# Patient Record
Sex: FEMALE | Race: WHITE | NOT HISPANIC OR LATINO
[De-identification: names, ages, dates, MRNs, and addresses within clinical notes are randomized per-mention and may not be internally consistent; named-entity substitution may affect disease eponyms.]

---

## 2018-05-17 ENCOUNTER — APPOINTMENT (OUTPATIENT)
Dept: DERMATOLOGY | Facility: CLINIC | Age: 63
End: 2018-05-17
Payer: COMMERCIAL

## 2018-05-17 VITALS
WEIGHT: 260 LBS | BODY MASS INDEX: 39.4 KG/M2 | SYSTOLIC BLOOD PRESSURE: 110 MMHG | HEIGHT: 68 IN | DIASTOLIC BLOOD PRESSURE: 60 MMHG

## 2018-05-17 DIAGNOSIS — Z12.83 ENCOUNTER FOR SCREENING FOR MALIGNANT NEOPLASM OF SKIN: ICD-10-CM

## 2018-05-17 DIAGNOSIS — Z87.19 PERSONAL HISTORY OF OTHER DISEASES OF THE DIGESTIVE SYSTEM: ICD-10-CM

## 2018-05-17 DIAGNOSIS — Z80.8 FAMILY HISTORY OF MALIGNANT NEOPLASM OF OTHER ORGANS OR SYSTEMS: ICD-10-CM

## 2018-05-17 DIAGNOSIS — L82.1 OTHER SEBORRHEIC KERATOSIS: ICD-10-CM

## 2018-05-17 DIAGNOSIS — L81.4 OTHER MELANIN HYPERPIGMENTATION: ICD-10-CM

## 2018-05-17 DIAGNOSIS — Z87.09 PERSONAL HISTORY OF OTHER DISEASES OF THE RESPIRATORY SYSTEM: ICD-10-CM

## 2018-05-17 DIAGNOSIS — Z87.898 PERSONAL HISTORY OF OTHER SPECIFIED CONDITIONS: ICD-10-CM

## 2018-05-17 PROCEDURE — 99203 OFFICE O/P NEW LOW 30 MIN: CPT

## 2018-10-22 ENCOUNTER — RESULT REVIEW (OUTPATIENT)
Age: 63
End: 2018-10-22

## 2019-09-17 ENCOUNTER — OUTPATIENT (OUTPATIENT)
Dept: OUTPATIENT SERVICES | Facility: HOSPITAL | Age: 64
LOS: 1 days | End: 2019-09-17
Payer: COMMERCIAL

## 2019-09-17 VITALS
SYSTOLIC BLOOD PRESSURE: 110 MMHG | RESPIRATION RATE: 16 BRPM | DIASTOLIC BLOOD PRESSURE: 70 MMHG | OXYGEN SATURATION: 97 % | HEIGHT: 68 IN | WEIGHT: 222.01 LBS | TEMPERATURE: 98 F | HEART RATE: 75 BPM

## 2019-09-17 DIAGNOSIS — F32.9 MAJOR DEPRESSIVE DISORDER, SINGLE EPISODE, UNSPECIFIED: ICD-10-CM

## 2019-09-17 DIAGNOSIS — N84.0 POLYP OF CORPUS UTERI: ICD-10-CM

## 2019-09-17 DIAGNOSIS — L65.9 NONSCARRING HAIR LOSS, UNSPECIFIED: ICD-10-CM

## 2019-09-17 DIAGNOSIS — Z98.890 OTHER SPECIFIED POSTPROCEDURAL STATES: Chronic | ICD-10-CM

## 2019-09-17 DIAGNOSIS — Z01.818 ENCOUNTER FOR OTHER PREPROCEDURAL EXAMINATION: ICD-10-CM

## 2019-09-17 LAB
ANION GAP SERPL CALC-SCNC: 13 MMOL/L — SIGNIFICANT CHANGE UP (ref 5–17)
BUN SERPL-MCNC: 20 MG/DL — SIGNIFICANT CHANGE UP (ref 7–23)
CALCIUM SERPL-MCNC: 9.6 MG/DL — SIGNIFICANT CHANGE UP (ref 8.4–10.5)
CHLORIDE SERPL-SCNC: 101 MMOL/L — SIGNIFICANT CHANGE UP (ref 96–108)
CO2 SERPL-SCNC: 24 MMOL/L — SIGNIFICANT CHANGE UP (ref 22–31)
CREAT SERPL-MCNC: 0.8 MG/DL — SIGNIFICANT CHANGE UP (ref 0.5–1.3)
GLUCOSE SERPL-MCNC: 101 MG/DL — HIGH (ref 70–99)
HCT VFR BLD CALC: 44.1 % — SIGNIFICANT CHANGE UP (ref 34.5–45)
HGB BLD-MCNC: 13.9 G/DL — SIGNIFICANT CHANGE UP (ref 11.5–15.5)
MCHC RBC-ENTMCNC: 29.9 PG — SIGNIFICANT CHANGE UP (ref 27–34)
MCHC RBC-ENTMCNC: 31.5 GM/DL — LOW (ref 32–36)
MCV RBC AUTO: 94.8 FL — SIGNIFICANT CHANGE UP (ref 80–100)
PLATELET # BLD AUTO: 322 K/UL — SIGNIFICANT CHANGE UP (ref 150–400)
POTASSIUM SERPL-MCNC: 3.4 MMOL/L — LOW (ref 3.5–5.3)
POTASSIUM SERPL-SCNC: 3.4 MMOL/L — LOW (ref 3.5–5.3)
RBC # BLD: 4.65 M/UL — SIGNIFICANT CHANGE UP (ref 3.8–5.2)
RBC # FLD: 14.1 % — SIGNIFICANT CHANGE UP (ref 10.3–14.5)
SODIUM SERPL-SCNC: 138 MMOL/L — SIGNIFICANT CHANGE UP (ref 135–145)
WBC # BLD: 7.78 K/UL — SIGNIFICANT CHANGE UP (ref 3.8–10.5)
WBC # FLD AUTO: 7.78 K/UL — SIGNIFICANT CHANGE UP (ref 3.8–10.5)

## 2019-09-17 PROCEDURE — G0463: CPT

## 2019-09-17 PROCEDURE — 80048 BASIC METABOLIC PNL TOTAL CA: CPT

## 2019-09-17 PROCEDURE — 85027 COMPLETE CBC AUTOMATED: CPT

## 2019-09-17 RX ORDER — LIDOCAINE HCL 20 MG/ML
0.2 VIAL (ML) INJECTION ONCE
Refills: 0 | Status: DISCONTINUED | OUTPATIENT
Start: 2019-09-24 | End: 2019-10-19

## 2019-09-17 RX ORDER — SODIUM CHLORIDE 9 MG/ML
3 INJECTION INTRAMUSCULAR; INTRAVENOUS; SUBCUTANEOUS EVERY 8 HOURS
Refills: 0 | Status: DISCONTINUED | OUTPATIENT
Start: 2019-09-24 | End: 2019-10-19

## 2019-09-17 NOTE — H&P PST ADULT - NSICDXPASTMEDICALHX_GEN_ALL_CORE_FT
PAST MEDICAL HISTORY:  Depression on medication    GERD (gastroesophageal reflux disease) on meds prn stable at present    Hair loss takes spironolactone ,finasteride  for supplement

## 2019-09-17 NOTE — H&P PST ADULT - NSICDXPROBLEM_GEN_ALL_CORE_FT
PROBLEM DIAGNOSES  Problem: Polyp of corpus uteri  Assessment and Plan: Exam under anesthesia ,dilation and curettage,operative hysteroscopy,removal of endometrial poly, PSt instruction given , CBC/BMP drawn sent pending result ,     Problem: Loss of hair  Assessment and Plan: Patient taking spironolactone /finasteride for hair loss prevention will not take them on DOS     Problem: Depressed mood  Assessment and Plan: To continue taking Wellbutrin regularly as prescribed

## 2019-09-17 NOTE — H&P PST ADULT - NSANTHOSAYNRD_GEN_A_CORE
No. JASWINDER screening performed.  STOP BANG Legend: 0-2 = LOW Risk; 3-4 = INTERMEDIATE Risk; 5-8 = HIGH Risk

## 2019-09-17 NOTE — H&P PST ADULT - HISTORY OF PRESENT ILLNESS
64 year old female with hx of depression gerd, hair loss. Patient went for regular gyn exam , had ultrasound done which was positive for uterine polyps. Patient was referred to Dr Bhagat , evaluated and now indicated this procedure for treatment.

## 2019-09-23 ENCOUNTER — TRANSCRIPTION ENCOUNTER (OUTPATIENT)
Age: 64
End: 2019-09-23

## 2019-09-24 ENCOUNTER — RESULT REVIEW (OUTPATIENT)
Age: 64
End: 2019-09-24

## 2019-09-24 ENCOUNTER — OUTPATIENT (OUTPATIENT)
Dept: OUTPATIENT SERVICES | Facility: HOSPITAL | Age: 64
LOS: 1 days | End: 2019-09-24
Payer: COMMERCIAL

## 2019-09-24 VITALS
SYSTOLIC BLOOD PRESSURE: 121 MMHG | RESPIRATION RATE: 18 BRPM | OXYGEN SATURATION: 100 % | HEART RATE: 76 BPM | DIASTOLIC BLOOD PRESSURE: 70 MMHG | TEMPERATURE: 98 F

## 2019-09-24 VITALS
HEIGHT: 68 IN | OXYGEN SATURATION: 98 % | DIASTOLIC BLOOD PRESSURE: 69 MMHG | HEART RATE: 76 BPM | SYSTOLIC BLOOD PRESSURE: 104 MMHG | TEMPERATURE: 99 F | WEIGHT: 222.01 LBS | RESPIRATION RATE: 16 BRPM

## 2019-09-24 DIAGNOSIS — Z98.890 OTHER SPECIFIED POSTPROCEDURAL STATES: Chronic | ICD-10-CM

## 2019-09-24 DIAGNOSIS — N84.0 POLYP OF CORPUS UTERI: ICD-10-CM

## 2019-09-24 PROCEDURE — 88305 TISSUE EXAM BY PATHOLOGIST: CPT | Mod: 26

## 2019-09-24 PROCEDURE — 88305 TISSUE EXAM BY PATHOLOGIST: CPT

## 2019-09-24 PROCEDURE — 58558 HYSTEROSCOPY BIOPSY: CPT

## 2019-09-24 RX ORDER — OXYCODONE HYDROCHLORIDE 5 MG/1
5 TABLET ORAL ONCE
Refills: 0 | Status: DISCONTINUED | OUTPATIENT
Start: 2019-09-24 | End: 2019-09-24

## 2019-09-24 RX ORDER — ACETAMINOPHEN 500 MG
1000 TABLET ORAL ONCE
Refills: 0 | Status: COMPLETED | OUTPATIENT
Start: 2019-09-24 | End: 2019-09-24

## 2019-09-24 RX ORDER — ONDANSETRON 8 MG/1
4 TABLET, FILM COATED ORAL ONCE
Refills: 0 | Status: DISCONTINUED | OUTPATIENT
Start: 2019-09-24 | End: 2019-10-19

## 2019-09-24 RX ORDER — CELECOXIB 200 MG/1
200 CAPSULE ORAL ONCE
Refills: 0 | Status: DISCONTINUED | OUTPATIENT
Start: 2019-09-24 | End: 2019-10-19

## 2019-09-24 RX ORDER — HYDROMORPHONE HYDROCHLORIDE 2 MG/ML
0.25 INJECTION INTRAMUSCULAR; INTRAVENOUS; SUBCUTANEOUS
Refills: 0 | Status: DISCONTINUED | OUTPATIENT
Start: 2019-09-24 | End: 2019-09-24

## 2019-09-24 RX ORDER — CELECOXIB 200 MG/1
200 CAPSULE ORAL ONCE
Refills: 0 | Status: COMPLETED | OUTPATIENT
Start: 2019-09-24 | End: 2019-09-24

## 2019-09-24 RX ORDER — SODIUM CHLORIDE 9 MG/ML
1000 INJECTION, SOLUTION INTRAVENOUS
Refills: 0 | Status: DISCONTINUED | OUTPATIENT
Start: 2019-09-24 | End: 2019-10-19

## 2019-09-24 RX ADMIN — Medication 1000 MILLIGRAM(S): at 07:23

## 2019-09-24 RX ADMIN — CELECOXIB 200 MILLIGRAM(S): 200 CAPSULE ORAL at 07:23

## 2019-09-24 NOTE — ASU DISCHARGE PLAN (ADULT/PEDIATRIC) - ASU DC SPECIAL INSTRUCTIONSFT
MD DANIEL HOOD MD MARINESE DORENE, MD CHUNG HETTY, MD HUNTER TIFFANY, MD AHAMED TARNIMA, MD  PHONE: [phone]  FAX: [phone]    POSTOPERATIVE INSTRUCTIONS FOR HYSTEROSCOPY, ENDOMETRIAL POLYP/FIBROID REMOVAL,D&C    After your surgery it is normal to experience:    •	Vaginal bleeding- can last 1-2 weeks and should not be heavier than a period. It may come and go and be red, brown or pink. Use pads, not tampons.  •	Cramping- Is common especially within the first 24 hours. This should be relieved by taking over the counter motrin, advil or Tylenol.  •	Watery discharge- can be seen for a day or two after hysteroscopy because some of the fluid that is put into the uterus during surgery may continue to leak out for a day or two.  •	Vaginal soreness/irritation- can occur in the first few days after surgery because of the instruments that were used in the vagina. Soreness can be treated with ice pack and irritation can be taken care of with an emollient such as balmex or aquaphor that you can put directly on the irritated area.    Restrictions: For 10-14 days after the surgery you should avoid the following:    •	Tampons  •	Sex  •	Vigorous gym exercise  •	Swimming  pools and tub baths  •	Wait a day or two before going back to work    Anesthesia Precautions:  For the next 12 hours do not:   •	drive a car,  •	 operate power tools or machinery,  •	drink alcohol, beer, or wine,   •	make important personal or business decisions    Diet:   •	Resume Regular diet but Progress diet slowly     Physician Notification- Warning signs to look out for  •	Heavy Vaginal Bleeding   •	Shortness of breath or chest pain  •	Severe Abdominal Pain  •	Persistent nausea and vomiting  •	Pain not relieved by medications  •	Fever greater than 100.5®F  •	Inability to tolerate liquids or foods  •	Unable to urinate after 8 hours    Discharge and Disposition  •	Discharge to home    Follow Up Care:  •	In the event that you develop a complication and you are unable to reach your own physician, you may contact:  [phone] or go to the nearest Emergency Room.   •	Please contact the office to make a follow up appointment [phone]

## 2019-09-24 NOTE — BRIEF OPERATIVE NOTE - NSICDXBRIEFPOSTOP_GEN_ALL_CORE_FT
POST-OP DIAGNOSIS:  Endometrial polyp 24-Sep-2019 09:08:03  Cuca Wise  Cervical polyp 24-Sep-2019 09:07:56  Cuca Wise

## 2019-09-24 NOTE — BRIEF OPERATIVE NOTE - NSICDXBRIEFPROCEDURE_GEN_ALL_CORE_FT
PROCEDURES:  Hysteroscopy with polypectomy of uterus 24-Sep-2019 09:08:18  Cuca Wise  Hysteroscopic surgical procedure 24-Sep-2019 09:08:16  Cuca Wise  Exam, under anesthesia 24-Sep-2019 09:08:10  Cuca Wise

## 2019-09-24 NOTE — ASU PATIENT PROFILE, ADULT - PMH
Depression  on medication  GERD (gastroesophageal reflux disease)  on meds prn stable at present  Hair loss  takes spironolactone ,finasteride  for supplement

## 2019-09-26 LAB — SURGICAL PATHOLOGY STUDY: SIGNIFICANT CHANGE UP

## 2021-01-13 ENCOUNTER — LABORATORY RESULT (OUTPATIENT)
Age: 66
End: 2021-01-13

## 2021-01-13 ENCOUNTER — RESULT REVIEW (OUTPATIENT)
Age: 66
End: 2021-01-13

## 2021-10-17 ENCOUNTER — INPATIENT (INPATIENT)
Facility: HOSPITAL | Age: 66
LOS: 5 days | Discharge: HOME | DRG: 86 | End: 2021-10-23
Attending: NEUROLOGICAL SURGERY | Admitting: NEUROLOGICAL SURGERY
Payer: COMMERCIAL

## 2021-10-17 VITALS — OXYGEN SATURATION: 97 % | SYSTOLIC BLOOD PRESSURE: 147 MMHG | HEART RATE: 76 BPM | DIASTOLIC BLOOD PRESSURE: 88 MMHG

## 2021-10-17 DIAGNOSIS — S06.6X9A TRAUMATIC SUBARACHNOID HEMORRHAGE WITH LOSS OF CONSCIOUSNESS OF UNSPECIFIED DURATION, INITIAL ENCOUNTER: ICD-10-CM

## 2021-10-17 DIAGNOSIS — Z90.5 ACQUIRED ABSENCE OF KIDNEY: Chronic | ICD-10-CM

## 2021-10-17 DIAGNOSIS — R09.89 OTHER SPECIFIED SYMPTOMS AND SIGNS INVOLVING THE CIRCULATORY AND RESPIRATORY SYSTEMS: ICD-10-CM

## 2021-10-17 DIAGNOSIS — Z98.890 OTHER SPECIFIED POSTPROCEDURAL STATES: Chronic | ICD-10-CM

## 2021-10-17 LAB
ALBUMIN SERPL ELPH-MCNC: 4 G/DL — SIGNIFICANT CHANGE UP (ref 3.3–5)
ALP SERPL-CCNC: 98 U/L — SIGNIFICANT CHANGE UP (ref 40–120)
ALT FLD-CCNC: 12 U/L — SIGNIFICANT CHANGE UP (ref 10–45)
ANION GAP SERPL CALC-SCNC: 14 MMOL/L — SIGNIFICANT CHANGE UP (ref 5–17)
APTT BLD: 29.7 SEC — SIGNIFICANT CHANGE UP (ref 27.5–35.5)
AST SERPL-CCNC: 17 U/L — SIGNIFICANT CHANGE UP (ref 10–40)
BASOPHILS # BLD AUTO: 0.07 K/UL — SIGNIFICANT CHANGE UP (ref 0–0.2)
BASOPHILS NFR BLD AUTO: 0.7 % — SIGNIFICANT CHANGE UP (ref 0–2)
BILIRUB SERPL-MCNC: 0.4 MG/DL — SIGNIFICANT CHANGE UP (ref 0.2–1.2)
BLD GP AB SCN SERPL QL: NEGATIVE — SIGNIFICANT CHANGE UP
BUN SERPL-MCNC: 22 MG/DL — SIGNIFICANT CHANGE UP (ref 7–23)
CALCIUM SERPL-MCNC: 9.4 MG/DL — SIGNIFICANT CHANGE UP (ref 8.4–10.5)
CHLORIDE SERPL-SCNC: 101 MMOL/L — SIGNIFICANT CHANGE UP (ref 96–108)
CO2 SERPL-SCNC: 21 MMOL/L — LOW (ref 22–31)
CREAT SERPL-MCNC: 1.24 MG/DL — SIGNIFICANT CHANGE UP (ref 0.5–1.3)
EOSINOPHIL # BLD AUTO: 0.15 K/UL — SIGNIFICANT CHANGE UP (ref 0–0.5)
EOSINOPHIL NFR BLD AUTO: 1.5 % — SIGNIFICANT CHANGE UP (ref 0–6)
ETHANOL SERPL-MCNC: SIGNIFICANT CHANGE UP MG/DL (ref 0–10)
GLUCOSE SERPL-MCNC: 144 MG/DL — HIGH (ref 70–99)
HCT VFR BLD CALC: 42.7 % — SIGNIFICANT CHANGE UP (ref 34.5–45)
HGB BLD-MCNC: 13.5 G/DL — SIGNIFICANT CHANGE UP (ref 11.5–15.5)
IMM GRANULOCYTES NFR BLD AUTO: 0.2 % — SIGNIFICANT CHANGE UP (ref 0–1.5)
INR BLD: 1.04 RATIO — SIGNIFICANT CHANGE UP (ref 0.88–1.16)
LYMPHOCYTES # BLD AUTO: 29.9 % — SIGNIFICANT CHANGE UP (ref 13–44)
LYMPHOCYTES # BLD AUTO: 3.04 K/UL — SIGNIFICANT CHANGE UP (ref 1–3.3)
MCHC RBC-ENTMCNC: 29.9 PG — SIGNIFICANT CHANGE UP (ref 27–34)
MCHC RBC-ENTMCNC: 31.6 GM/DL — LOW (ref 32–36)
MCV RBC AUTO: 94.7 FL — SIGNIFICANT CHANGE UP (ref 80–100)
MONOCYTES # BLD AUTO: 0.77 K/UL — SIGNIFICANT CHANGE UP (ref 0–0.9)
MONOCYTES NFR BLD AUTO: 7.6 % — SIGNIFICANT CHANGE UP (ref 2–14)
NEUTROPHILS # BLD AUTO: 6.11 K/UL — SIGNIFICANT CHANGE UP (ref 1.8–7.4)
NEUTROPHILS NFR BLD AUTO: 60.1 % — SIGNIFICANT CHANGE UP (ref 43–77)
NRBC # BLD: 0 /100 WBCS — SIGNIFICANT CHANGE UP (ref 0–0)
PA ADP PRP-ACNC: 220 PRU — SIGNIFICANT CHANGE UP (ref 194–417)
PLATELET # BLD AUTO: 236 K/UL — SIGNIFICANT CHANGE UP (ref 150–400)
PLATELET RESPONSE ASPIRIN RESULT: 632 ARU — SIGNIFICANT CHANGE UP (ref 350–700)
POTASSIUM SERPL-MCNC: 3.8 MMOL/L — SIGNIFICANT CHANGE UP (ref 3.5–5.3)
POTASSIUM SERPL-SCNC: 3.8 MMOL/L — SIGNIFICANT CHANGE UP (ref 3.5–5.3)
PROT SERPL-MCNC: 6.9 G/DL — SIGNIFICANT CHANGE UP (ref 6–8.3)
PROTHROM AB SERPL-ACNC: 12.4 SEC — SIGNIFICANT CHANGE UP (ref 10.6–13.6)
RBC # BLD: 4.51 M/UL — SIGNIFICANT CHANGE UP (ref 3.8–5.2)
RBC # FLD: 13.9 % — SIGNIFICANT CHANGE UP (ref 10.3–14.5)
RH IG SCN BLD-IMP: POSITIVE — SIGNIFICANT CHANGE UP
SARS-COV-2 RNA SPEC QL NAA+PROBE: SIGNIFICANT CHANGE UP
SODIUM SERPL-SCNC: 136 MMOL/L — SIGNIFICANT CHANGE UP (ref 135–145)
TROPONIN T, HIGH SENSITIVITY RESULT: <6 NG/L — SIGNIFICANT CHANGE UP (ref 0–51)
WBC # BLD: 10.16 K/UL — SIGNIFICANT CHANGE UP (ref 3.8–10.5)
WBC # FLD AUTO: 10.16 K/UL — SIGNIFICANT CHANGE UP (ref 3.8–10.5)

## 2021-10-17 PROCEDURE — 99291 CRITICAL CARE FIRST HOUR: CPT

## 2021-10-17 PROCEDURE — 93010 ELECTROCARDIOGRAM REPORT: CPT

## 2021-10-17 PROCEDURE — 71045 X-RAY EXAM CHEST 1 VIEW: CPT | Mod: 26

## 2021-10-17 PROCEDURE — 70496 CT ANGIOGRAPHY HEAD: CPT | Mod: 26,MA

## 2021-10-17 PROCEDURE — 70450 CT HEAD/BRAIN W/O DYE: CPT | Mod: 26,59,77,MA

## 2021-10-17 PROCEDURE — 72125 CT NECK SPINE W/O DYE: CPT | Mod: 26,MA

## 2021-10-17 PROCEDURE — 99232 SBSQ HOSP IP/OBS MODERATE 35: CPT | Mod: GC

## 2021-10-17 PROCEDURE — 74177 CT ABD & PELVIS W/CONTRAST: CPT | Mod: 26,MA

## 2021-10-17 PROCEDURE — 71260 CT THORAX DX C+: CPT | Mod: 26,MA

## 2021-10-17 PROCEDURE — 70450 CT HEAD/BRAIN W/O DYE: CPT | Mod: 26,59,MA

## 2021-10-17 PROCEDURE — 72170 X-RAY EXAM OF PELVIS: CPT | Mod: 26

## 2021-10-17 RX ORDER — ONDANSETRON 8 MG/1
4 TABLET, FILM COATED ORAL EVERY 6 HOURS
Refills: 0 | Status: DISCONTINUED | OUTPATIENT
Start: 2021-10-17 | End: 2021-10-23

## 2021-10-17 RX ORDER — VENLAFAXINE HCL 75 MG
150 CAPSULE, EXT RELEASE 24 HR ORAL DAILY
Refills: 0 | Status: DISCONTINUED | OUTPATIENT
Start: 2021-10-17 | End: 2021-10-23

## 2021-10-17 RX ORDER — OXYCODONE HYDROCHLORIDE 5 MG/1
10 TABLET ORAL EVERY 4 HOURS
Refills: 0 | Status: DISCONTINUED | OUTPATIENT
Start: 2021-10-17 | End: 2021-10-23

## 2021-10-17 RX ORDER — BUPROPION HYDROCHLORIDE 150 MG/1
300 TABLET, EXTENDED RELEASE ORAL DAILY
Refills: 0 | Status: DISCONTINUED | OUTPATIENT
Start: 2021-10-17 | End: 2021-10-18

## 2021-10-17 RX ORDER — SENNA PLUS 8.6 MG/1
2 TABLET ORAL AT BEDTIME
Refills: 0 | Status: DISCONTINUED | OUTPATIENT
Start: 2021-10-17 | End: 2021-10-20

## 2021-10-17 RX ORDER — OXYCODONE HYDROCHLORIDE 5 MG/1
5 TABLET ORAL EVERY 4 HOURS
Refills: 0 | Status: DISCONTINUED | OUTPATIENT
Start: 2021-10-17 | End: 2021-10-23

## 2021-10-17 RX ORDER — ACETAMINOPHEN 500 MG
650 TABLET ORAL EVERY 6 HOURS
Refills: 0 | Status: DISCONTINUED | OUTPATIENT
Start: 2021-10-17 | End: 2021-10-23

## 2021-10-17 RX ORDER — LEVETIRACETAM 250 MG/1
500 TABLET, FILM COATED ORAL EVERY 12 HOURS
Refills: 0 | Status: DISCONTINUED | OUTPATIENT
Start: 2021-10-17 | End: 2021-10-23

## 2021-10-17 RX ORDER — ONDANSETRON 8 MG/1
4 TABLET, FILM COATED ORAL ONCE
Refills: 0 | Status: COMPLETED | OUTPATIENT
Start: 2021-10-17 | End: 2021-10-17

## 2021-10-17 RX ORDER — POLYETHYLENE GLYCOL 3350 17 G/17G
17 POWDER, FOR SOLUTION ORAL EVERY 12 HOURS
Refills: 0 | Status: DISCONTINUED | OUTPATIENT
Start: 2021-10-17 | End: 2021-10-23

## 2021-10-17 RX ORDER — SODIUM CHLORIDE 9 MG/ML
1000 INJECTION INTRAMUSCULAR; INTRAVENOUS; SUBCUTANEOUS
Refills: 0 | Status: DISCONTINUED | OUTPATIENT
Start: 2021-10-17 | End: 2021-10-20

## 2021-10-17 RX ORDER — PANTOPRAZOLE SODIUM 20 MG/1
40 TABLET, DELAYED RELEASE ORAL
Refills: 0 | Status: DISCONTINUED | OUTPATIENT
Start: 2021-10-17 | End: 2021-10-23

## 2021-10-17 RX ORDER — METOCLOPRAMIDE HCL 10 MG
10 TABLET ORAL ONCE
Refills: 0 | Status: COMPLETED | OUTPATIENT
Start: 2021-10-17 | End: 2021-10-17

## 2021-10-17 RX ADMIN — ONDANSETRON 4 MILLIGRAM(S): 8 TABLET, FILM COATED ORAL at 19:29

## 2021-10-17 RX ADMIN — Medication 10 MILLIGRAM(S): at 20:11

## 2021-10-17 RX ADMIN — ONDANSETRON 4 MILLIGRAM(S): 8 TABLET, FILM COATED ORAL at 17:38

## 2021-10-17 NOTE — H&P ADULT - HISTORY OF PRESENT ILLNESS
66F s/p mech fall down stairs, last ASA 3d ago, , pw HA/nausea (concussion sxs), labs wnl, CTH w/ trace R frontal tSAH and L sub occip skull fx, repeat CTH w/ trace inc R frontal tsah, thin bifrontal sdh, and new thin temporal tsah. CTV hypoplastic L TS but open. No other inj. Exam: EOV otherwise neuro intact. GCS 14.

## 2021-10-17 NOTE — ED PROVIDER NOTE - PROGRESS NOTE DETAILS
Gio, PGY3: when pt laying flat had 1 episode of non bloody emesis Attending MD Sawant.  ALIS paged for consult 2/2 above.  Gen surg consulted 2/2 traumatic ICH with somnolence but rousability and GCS 15.  No other areas of injury identified on initial primary/secondary survey however suspect need for admission/further care 2/2 above.  Will discuss with both services. Attending MD Sawant.  Pt endorsed to gen surg. Attending MD Sawant.  Received call from radiology re: pt's images.  Concern for R posterior, parietal-occipital calvarial fx and some hyperattenuation in R frontal convexity concerning for SAH.  Concern for coup-contracoup.  R frontal injury. Ford, PGY3 - Discussed with neurosurgery, plan 3 hour CT venogram and rpt CTH - will do at 10pm. will come see pt

## 2021-10-17 NOTE — ED PROVIDER NOTE - OBJECTIVE STATEMENT
67y/o F w/ h/o HLD, KAI, MDD on occasional ASA BIBEMS for trauma. 20 min prior to arrival. Pt fell down 6steps had LOC for 1min now c/o nausea. No dizziness, headache. Believes it might be mechanical fall. 65y/o F w/ h/o HLD, KAI, MDD on occasional ASA BIBEMS for trauma 20 min prior to arrival fell down 6 steps had LOC for 1min now c/o nausea. No dizziness, headache. Pt believes it might be mechanical fall and tripped going down stairs.    A-intact  B-b/l breath sounds  C-2+pulses in upper and lower extremities  D-GCS 15  E- no evidence of trauma or midline tenderness

## 2021-10-17 NOTE — H&P ADULT - NSHPPHYSICALEXAM_GEN_ALL_CORE
See A/P AOx3, EOV, PERRL (R pupil surgical), EOMI, L trace facial (b/l from plastic sx), LEONARD 5/5, no drift, SILT

## 2021-10-17 NOTE — ED PROVIDER NOTE - NS ED ROS FT
GENERAL: No fever, no chills  EYES: no change in vision  HEENT: no trouble swallowing, no trouble speaking  CARDIAC: no chest pain, no palpitations  PULMONARY: no cough, no SOB  GI: no abdominal pain, + nausea, no vomiting, no diarrhea, no constipation  : no dysuria, no frequency, no change in appearance, no odor of urine  SKIN: no rashes  NEURO: no headache, no weakness  MSK: no joint pain

## 2021-10-17 NOTE — ED ADULT NURSE REASSESSMENT NOTE - NS ED NURSE REASSESS COMMENT FT1
Received report from Janny MAY. Patient AAOX3, c/o nausea. ED MD Sawant aware. Neuro check completed and placed in patients chart. Received report from Janny MAY. Patient AAOX3, daughter at bedside. Pt c/o nausea, no pain at this time. ED MD Sawant made aware. Neuro check completed, placed in patients chart.

## 2021-10-17 NOTE — ED PROVIDER NOTE - ATTENDING CONTRIBUTION TO CARE
Attending MD Sawant.  Agree with HPI by resident.  Pt with initial GCS in field reported to be 13.  On arrival to ED however pt is fatigued/somnolent but easily rousable and A & O x 4 without memory of event or lead up to event.  Family present and states that she was behaving normally prior to event.  No report of CP/SOB/light-headedness or severe headache prior to event.  Family states that pt has hx of renal cell CA s/p resection/chemo and no evidence of disease now at 1 yr f/u.  No AC on board, occasional ASA only.  PT neurologically afocal on arrival.  Denies HA/CP/SOB or pain complaints at all.  Pt's only complaint is nausea.  Pt has had 1 episode non-bloody, non-bilious emesis in ED.  Pt in C-collar on arrival.  2/2 GCS 15 and no pain complaints, no trauma level called on arrival.  Pt expedited to imaging however for concern for traumatic bleed.    Critical care billing:  Upon my evaluation, this patient had a high probability of imminent or life-threatening deterioration due to traumatic subarachnoid hemorrhage, [id number] injury, which required my direct attention, intervention, and personal management.  The patient has a  medical condition that impairs one or more vital organ systems.  Frequent personal assessment and adjustment of medical interventions was performed.      I have personally provided 45 minutes of critical care time exclusive of time spent on separately billable procedures. Time includes review of laboratory data, radiology results, discussion with consultants, patient and family; monitoring for potential decompensation, as well as time spent retrieving data and reviewing the chart and documenting the visit. Interventions were performed as documented above.

## 2021-10-17 NOTE — H&P ADULT - ASSESSMENT
ROSETTA LOWRY  66F s/p mech fall down stairs, last ASA 3d ago, , pw HA/nausea (concussion sxs), labs wnl, CTH w/ trace R frontal tSAH and L sub occip skull fx, repeat CTH w/ trace inc R frontal tsah, thin bifrontal sdh, and new thin temporal tsah. CTV hypoplastic L TS but open. No other inj. Exam: EOV otherwise neuro intact. GCS 14.  - adm 4 [facility] for obs and repeat CTH in AM, antinausea/concussion mgmt   - no acute surgical intervention  - hold ASA, keppra x7d  - neurology/concussion outpt fu  - PT/OT  - q4h neuro checks

## 2021-10-17 NOTE — ED PROVIDER NOTE - CARE PLAN
Principal Discharge DX:	Traumatic subarachnoid hemorrhage  Secondary Diagnosis:	Calvarial fracture   1

## 2021-10-17 NOTE — ED ADULT NURSE NOTE - OBJECTIVE STATEMENT
65 yo presents to the ED from home. A&Ox4 by EMS s/p trip and fall down 10 steps. pt reports LOC. daughter reports AMS since fall. daughter reports that pt is "slower". pt reports nausea with multiple episodes of vomiting. on aspirin daily. neuro intact. pupils reactive bilaterally. strength equal bilaterally UE and LE. denies abd pain. pt arrives in C collar. pt arrives with IV in L hand. additional IVs placed.

## 2021-10-17 NOTE — CONSULT NOTE ADULT - SUBJECTIVE AND OBJECTIVE BOX
TRAUMA SURGERY CONSULT NOTE  :::::::::::::::::::::::::::::::::::::::::::::::::::::  ROSETTA LOWRY 66y F  MRN: [id number]  Admit Date: 10-17-21  [facility] ED  :::::::::::::::::::::::::::::::::::::::::::::::::::::  Date of Service: 10-17-21 @ 19:38  Requested by: ED    Reason for consult: Mechanical fall    :::::::::::::::::::::::::::::::::::::::::::::::::::::    HPI:   66 year old Female with PMH of HLD, HAD, MDD, hiatal hernia and PSgx right renal cancer s/p nephrectomy with ureterostomy 10/2019 not on AC medications who presents after an unwitnessed mechanical fall down 6 steps referring she "slipped" without preceding symptoms. Denies hitting her head and was able to ambulate without difficulty however admits poor recollection of the event. Denies previous falls or traumatic injuries.    In the ED patient complains of nausea and one episode of non-bloody emesis associated with somnolence and headache. Denies visual disturbances, dizziness, chest pain, SOB, abdominal pain, dysuria or issues with ambulating.       Allergies: No Known Allergies    Past Medical History:   HLD (hyperlipidemia)  MDD  HLD    Past Surgical History:  S/p nephrectomy  H/O ureterostomy    Family History:  No family history of heritable disease    Social History:   Denies toxic habits    Home Medications:  Effexor 75mg BID  Wellbutrin 400mg Daily     ::::::::::::::::::::::::::::::::::::::::::::::::::::::::::::::::::::::::::::::::::::::::::::::::::::::::::::::::::::::::::::::::::::::::::    Vital signs:  T(C): 36.3, Max: 36.6 (10-17 @ 18:00)  HR: 74 (71 - 76)  BP: 125/77 (125/77 - 147/88)  BP(mean): 92 (92 - 92)  ABP: --  RR: 18 (18 - 18)  SpO2: 98% (97% - 98%)    Primary Survey:  Airway: Intact  Breathing: Symmetric chest expansion, conversational  Cardiovascular: Peripheral pulses bilaterally in upper and lower extremities +2, AAOx3    Secondary Survey:  General: NAD, female appearing stated age  Neuro: Awake, alert and oriented, no focal deficits sensory and motor intact bilaterally in upper and lower extremities  HEENT: NC, AT, MOM  Resp: Unlabored breathing, symmetric chest expansion  GI/Abd: Soft, NT/ND  Musculoskeletal: All 4 extremities moving spontaneously and without deformity  Vascular: Peripheral pulses +2 in upper and lower extremities  Skin: Warm, dry, no rash    ::::::::::::::::::::::::::::::::::::::::::::::::::::::::::::::::::::::::::::::::::::::::::::::::::::::::::::::::::::::::::::::::::::::::::    Laboratory:                        13.5   10.16 )-----------( 236      ( 10-17 @ 17:38 )             42.7     10-17                             Phos: x  Mg: x   136  |  101  |  22  ----------------------------<  144  3.8   |  21  |  1.24        10-17   TPro 6.9 / Alb 4.0 / TBili 0.4 / DBili x  / AST/AST 17/12 / AlkPhos 98    PT/INR/PTT - (10-17 @ 17:38) PT: 12.4 sec; INR: 1.04 ratio ; PTT: 29.7 sec              ::::::::::::::::::::::::::::::::::::::::::::::::::::::::::::::::::::::::::::::::::::::::::::::::::::::::::::::::::::::::::::::::::::::::::    Imaging:  < from: Xray Chest 1 View AP/PA (10.17.21 @ 18:24) >  EXAM:  XR CHEST AP OR PA 1V                            PROCEDURE DATE:  10/17/2021      ******PRELIMINARY REPORT******    ******PRELIMINARY REPORT******              INTERPRETATION:  No focal airspace opacity.  No pneumothorax.    < end of copied text >  < from: Xray Pelvis AP only (10.17.21 @ 18:24) >  EXAM:  XR PELVIS AP ONLY 1-2 VIEWS                            PROCEDURE DATE:  10/17/2021      ******PRELIMINARY REPORT******    ******PRELIMINARY REPORT******              INTERPRETATION:  No acute fracture or dislocation.    < end of copied text >  < from: CT Cervical Spine No Cont (10.17.21 @ 19:14) >  FINDINGS:    BRAIN:    There are small foci of hyperattenuation in the right frontal convexity measuring up to 4 cm compatible with small traumatic subarachnoid hemorrhage (2:22). No acute intraparenchymal hemorrhage, mass effect, or midline shift. Age appropriate involutional changes and mild small vessel white matter changes.No hydrocephalus.    PARANASAL SINUSES: Within normal limits.  TYMPANOMASTOID CAVITIES: Within normal limits.  ORBITS: Right intraocular lens replacement.  CALVARIUM: Acute nondisplaced fracture of the left left suboccipital calvarium  MISCELLANEOUS: Left posterior parieto-occipital subcutaneous hematoma.    CERVICAL SPINE:  ALIGNMENT: Straightening of the cervical lordosis.  BONES: No acute fracture or subluxation.  DISCS:Multilevel degenerative changes characterized by intervertebral disc height loss most notably at C5-C7 with small disc bulges, marginal osteophyte formation, and uncovertebral and facet joint arthrosis. No significant spinal canal or foraminal narrowing at CT.  NECK SOFT TISSUES: No prevertebral soft tissue swelling.  VISUALIZED LUNGS: Within normal limits.    IMPRESSION:    Head CT: Acute subarachnoid hemorrhage in the right frontal convexity with a acute nondisplaced left suboccipital calvarialfracture. Left parieto-occipital extracalvarial soft tissue swelling    Cervical spine CT: No acute fracture or subluxation.    These findings were discussed with Dr. Sawant at 10/17/2021 6:39 PM by Dr. Penn with read back confirmation.    --- End of Report ---    < end of copied text >  < from: CT Chest w/ IV Cont (10.17.21 @ 19:16) >  FINDINGS:  CHEST:  LUNGS AND LARGE AIRWAYS: Patent central airways. Trace bibasilar subsegmental atelectasis.  PLEURA: No pleural effusion.  VESSELS: Within normal limits.  HEART: Heart size is normal. No pericardial effusion.  MEDIASTINUM AND BRYCE: No lymphadenopathy.  CHEST WALL AND LOWER NECK: Within normal limits.    ABDOMEN AND PELVIS:  LIVER: Subcentimeter hypoattenuating focus on the hepatic dome which is too small to characterize.  BILE DUCTS: Normal caliber.  GALLBLADDER: Within normal limits.  SPLEEN: Within normal limits.  PANCREAS: Within normal limits.  ADRENALS: Withinnormal limits.  KIDNEYS/URETERS: Status post right nephrectomy. Left renal cyst and additional subcentimeter hypoattenuating foci which are too small to characterize.    BLADDER: Within normal limits.  REPRODUCTIVE ORGANS: Uterus and adnexa within normal limits.    BOWEL: No bowel obstruction.  PERITONEUM: No ascites.  VESSELS: Atherosclerotic changes.  RETROPERITONEUM/LYMPH NODES: No lymphadenopathy.  ABDOMINAL WALL: Small fat-containing umbilical hernia.  BONES: Degenerative changes.    IMPRESSION:    No acute intrathoracic or intra-abdominal/pelvic pathology.        < end of copied text >      ::::::::::::::::::::::::::::::::::::::::::::::::::::::::::::::::::::::::::::::::::::::::::::::::::::::::::::::::::::::::::::::::::::::::::

## 2021-10-17 NOTE — ED PROVIDER NOTE - PHYSICAL EXAMINATION
Gen: NAD, non-toxic appearing  Head: normal appearing  HEENT: normal conjunctiva, oral mucosa moist  Lung: no respiratory distress, speaking in full sentences, CTA b/l     CV: regular rate and rhythm, no murmurs  Abd: soft, non distended, non tender   MSK: no visible deformities, no midline tenderness  Neuro: No focal deficits, AAOx3  Skin: Warm  Psych: normal affect

## 2021-10-17 NOTE — H&P ADULT - ATTENDING COMMENTS
Pt seen and examined 10/18/21.  Agree with above resident evaluation and assessment.  Pt seen and in bed, preferring not to open eyes, except briefly.  LEONARD on command.  CT with now stable bilateral thin SDH/TSAH, no mass effect.  Continue monitoring.

## 2021-10-17 NOTE — ED ADULT NURSE NOTE - NSIMPLEMENTINTERV_GEN_ALL_ED
Implemented All Fall Risk Interventions:  [place] to call system. Call bell, personal items and telephone within reach. Instruct patient to call for assistance. Room bathroom lighting operational. Non-slip footwear when patient is off stretcher. Physically safe environment: no spills, clutter or unnecessary equipment. Stretcher in lowest position, wheels locked, appropriate side rails in place. Provide visual cue, wrist band, yellow gown, etc. Monitor gait and stability. Monitor for mental status changes and reorient to person, place, and time. Review medications for side effects contributing to fall risk. Reinforce activity limits and safety measures with patient and family.

## 2021-10-17 NOTE — H&P ADULT - NSHPLABSRESULTS_GEN_ALL_CORE
13.5   10.16 )-----------( 236      ( 17 Oct 2021 17:38 )             42.7     10-17    136  |  101  |  22  ----------------------------<  144<H>  3.8   |  21<L>  |  1.24    Ca    9.4      17 Oct 2021 17:38    TPro  6.9  /  Alb  4.0  /  TBili  0.4  /  DBili  x   /  AST  17  /  ALT  12  /  AlkPhos  98  10-17

## 2021-10-17 NOTE — ED ADULT NURSE REASSESSMENT NOTE - NS ED NURSE REASSESS COMMENT FT1
Patient resting comfortably, no complaints at this time. Patient clicked RTM Neuro, neuro check completed and placed in patients chart. Neuro check and vital signs order changed to [id number] by admitting MD.

## 2021-10-18 DIAGNOSIS — F32.A DEPRESSION, UNSPECIFIED: ICD-10-CM

## 2021-10-18 DIAGNOSIS — I60.9 NONTRAUMATIC SUBARACHNOID HEMORRHAGE, UNSPECIFIED: ICD-10-CM

## 2021-10-18 DIAGNOSIS — I95.1 ORTHOSTATIC HYPOTENSION: ICD-10-CM

## 2021-10-18 LAB
ALBUMIN SERPL ELPH-MCNC: 4.3 G/DL — SIGNIFICANT CHANGE UP (ref 3.3–5)
ALP SERPL-CCNC: 100 U/L — SIGNIFICANT CHANGE UP (ref 40–120)
ALT FLD-CCNC: 13 U/L — SIGNIFICANT CHANGE UP (ref 10–45)
ANION GAP SERPL CALC-SCNC: 13 MMOL/L — SIGNIFICANT CHANGE UP (ref 5–17)
AST SERPL-CCNC: 18 U/L — SIGNIFICANT CHANGE UP (ref 10–40)
BASOPHILS # BLD AUTO: 0.02 K/UL — SIGNIFICANT CHANGE UP (ref 0–0.2)
BASOPHILS NFR BLD AUTO: 0.2 % — SIGNIFICANT CHANGE UP (ref 0–2)
BILIRUB SERPL-MCNC: 0.7 MG/DL — SIGNIFICANT CHANGE UP (ref 0.2–1.2)
BUN SERPL-MCNC: 18 MG/DL — SIGNIFICANT CHANGE UP (ref 7–23)
CALCIUM SERPL-MCNC: 9.5 MG/DL — SIGNIFICANT CHANGE UP (ref 8.4–10.5)
CHLORIDE SERPL-SCNC: 100 MMOL/L — SIGNIFICANT CHANGE UP (ref 96–108)
CO2 SERPL-SCNC: 23 MMOL/L — SIGNIFICANT CHANGE UP (ref 22–31)
CREAT SERPL-MCNC: 1.13 MG/DL — SIGNIFICANT CHANGE UP (ref 0.5–1.3)
EOSINOPHIL # BLD AUTO: 0 K/UL — SIGNIFICANT CHANGE UP (ref 0–0.5)
EOSINOPHIL NFR BLD AUTO: 0 % — SIGNIFICANT CHANGE UP (ref 0–6)
GLUCOSE SERPL-MCNC: 118 MG/DL — HIGH (ref 70–99)
HCT VFR BLD CALC: 41.7 % — SIGNIFICANT CHANGE UP (ref 34.5–45)
HCV AB S/CO SERPL IA: 0.11 S/CO — SIGNIFICANT CHANGE UP (ref 0–0.99)
HCV AB SERPL-IMP: SIGNIFICANT CHANGE UP
HGB BLD-MCNC: 13.7 G/DL — SIGNIFICANT CHANGE UP (ref 11.5–15.5)
IMM GRANULOCYTES NFR BLD AUTO: 0.5 % — SIGNIFICANT CHANGE UP (ref 0–1.5)
LYMPHOCYTES # BLD AUTO: 0.72 K/UL — LOW (ref 1–3.3)
LYMPHOCYTES # BLD AUTO: 5.6 % — LOW (ref 13–44)
MCHC RBC-ENTMCNC: 31.1 PG — SIGNIFICANT CHANGE UP (ref 27–34)
MCHC RBC-ENTMCNC: 32.9 GM/DL — SIGNIFICANT CHANGE UP (ref 32–36)
MCV RBC AUTO: 94.8 FL — SIGNIFICANT CHANGE UP (ref 80–100)
MONOCYTES # BLD AUTO: 0.56 K/UL — SIGNIFICANT CHANGE UP (ref 0–0.9)
MONOCYTES NFR BLD AUTO: 4.4 % — SIGNIFICANT CHANGE UP (ref 2–14)
NEUTROPHILS # BLD AUTO: 11.48 K/UL — HIGH (ref 1.8–7.4)
NEUTROPHILS NFR BLD AUTO: 89.3 % — HIGH (ref 43–77)
NRBC # BLD: 0 /100 WBCS — SIGNIFICANT CHANGE UP (ref 0–0)
PLATELET # BLD AUTO: 251 K/UL — SIGNIFICANT CHANGE UP (ref 150–400)
POTASSIUM SERPL-MCNC: 4.3 MMOL/L — SIGNIFICANT CHANGE UP (ref 3.5–5.3)
POTASSIUM SERPL-SCNC: 4.3 MMOL/L — SIGNIFICANT CHANGE UP (ref 3.5–5.3)
PROT SERPL-MCNC: 7.2 G/DL — SIGNIFICANT CHANGE UP (ref 6–8.3)
RBC # BLD: 4.4 M/UL — SIGNIFICANT CHANGE UP (ref 3.8–5.2)
RBC # FLD: 13.9 % — SIGNIFICANT CHANGE UP (ref 10.3–14.5)
SODIUM SERPL-SCNC: 136 MMOL/L — SIGNIFICANT CHANGE UP (ref 135–145)
WBC # BLD: 12.84 K/UL — HIGH (ref 3.8–10.5)
WBC # FLD AUTO: 12.84 K/UL — HIGH (ref 3.8–10.5)

## 2021-10-18 PROCEDURE — 99223 1ST HOSP IP/OBS HIGH 75: CPT

## 2021-10-18 PROCEDURE — 70450 CT HEAD/BRAIN W/O DYE: CPT | Mod: 26

## 2021-10-18 PROCEDURE — 99232 SBSQ HOSP IP/OBS MODERATE 35: CPT

## 2021-10-18 RX ORDER — BUPROPION HYDROCHLORIDE 150 MG/1
400 TABLET, EXTENDED RELEASE ORAL DAILY
Refills: 0 | Status: DISCONTINUED | OUTPATIENT
Start: 2021-10-19 | End: 2021-10-20

## 2021-10-18 RX ORDER — INFLUENZA VIRUS VACCINE 15; 15; 15; 15 UG/.5ML; UG/.5ML; UG/.5ML; UG/.5ML
0.5 SUSPENSION INTRAMUSCULAR ONCE
Refills: 0 | Status: DISCONTINUED | OUTPATIENT
Start: 2021-10-18 | End: 2021-10-23

## 2021-10-18 RX ADMIN — Medication 650 MILLIGRAM(S): at 17:07

## 2021-10-18 RX ADMIN — Medication 650 MILLIGRAM(S): at 17:29

## 2021-10-18 RX ADMIN — Medication 30 MILLILITER(S): at 17:07

## 2021-10-18 RX ADMIN — POLYETHYLENE GLYCOL 3350 17 GRAM(S): 17 POWDER, FOR SOLUTION ORAL at 17:07

## 2021-10-18 RX ADMIN — PANTOPRAZOLE SODIUM 40 MILLIGRAM(S): 20 TABLET, DELAYED RELEASE ORAL at 05:21

## 2021-10-18 RX ADMIN — BUPROPION HYDROCHLORIDE 300 MILLIGRAM(S): 150 TABLET, EXTENDED RELEASE ORAL at 15:12

## 2021-10-18 RX ADMIN — Medication 150 MILLIGRAM(S): at 15:12

## 2021-10-18 RX ADMIN — Medication 1 TABLET(S): at 15:12

## 2021-10-18 RX ADMIN — LEVETIRACETAM 500 MILLIGRAM(S): 250 TABLET, FILM COATED ORAL at 17:21

## 2021-10-18 RX ADMIN — LEVETIRACETAM 500 MILLIGRAM(S): 250 TABLET, FILM COATED ORAL at 05:21

## 2021-10-18 NOTE — PHYSICAL THERAPY INITIAL EVALUATION ADULT - GAIT DEVIATIONS NOTED, PT EVAL
ataxic, RLE scissoring./decreased jl/increased time in double stance/decreased step length/decreased stride length/decreased swing-to-stance ratio/decreased weight-shifting ability

## 2021-10-18 NOTE — PROGRESS NOTE ADULT - SUBJECTIVE AND OBJECTIVE BOX
HPI:  Patient is a 66 year old female that had mechanical fall down stairs and presented with a headache.  Was on aspirin.  CT head done which showed sub occipital skull fracture and tSAH.  Admitted the neurosurgery service for further management.    OVERNIGHT EVENTS:  No acute events overnight.  Had repeat ct head this am.  Noted to be mildly orthostatic by nursing when out of bed this morning.      Vital Signs Last 24 Hrs  T(C): 36.7 (18 Oct 2021 08:19), Max: 36.9 (17 Oct 2021 23:15)  T(F): 98 (18 Oct 2021 08:19), Max: 98.5 (17 Oct 2021 23:15)  HR: 78 (18 Oct 2021 08:19) (71 - 88)  BP: 97/61 (18 Oct 2021 08:19) (97/61 - 147/88)  BP(mean): 92 (17 Oct 2021 19:15) (92 - 92)  RR: 17 (18 Oct 2021 08:19) (15 - 18)  SpO2: 95% (18 Oct 2021 08:19) (95% - 99%)        PHYSICAL EXAM:  Neurological: awake, alert, oriented x3, follows commands, speech clear and fluent, perrl, eomi, face symmetric, tongue midline, no drift b/l, moves all extremities x4 w/ 5/5 strength throughout, sensation present, intact, equal throughout    Cardiovascular: +s1, s2  Respiratory: clear to auscultation b/l  Gastrointestinal: soft, non-distended, non-tender  Genitourinary: +voiding    TUBES/LINES:  [x] none    DIET:  [x] regular      LABS:                        13.7   12.84 )-----------( 251      ( 18 Oct 2021 07:08 )             41.7     10-18    136  |  100  |  18  ----------------------------<  118<H>  4.3   |  23  |  1.13    Ca    9.5      18 Oct 2021 07:08    TPro  7.2  /  Alb  4.3  /  TBili  0.7  /  DBili  x   /  AST  18  /  ALT  13  /  AlkPhos  100  10-18    PT/INR - ( 17 Oct 2021 17:38 )   PT: 12.4 sec;   INR: 1.04 ratio         PTT - ( 17 Oct 2021 17:38 )  PTT:29.7 sec          Allergies    No Known Allergies          MEDICATIONS:  Antibiotics:  none    Neuro:  acetaminophen   Tablet .. 650 milliGRAM(s) Oral every 6 hours PRN  buPROPion XL (24-Hour) . 300 milliGRAM(s) Oral daily  levETIRAcetam 500 milliGRAM(s) Oral every 12 hours  ondansetron Injectable 4 milliGRAM(s) IV Push every 6 hours PRN  oxyCODONE    IR 5 milliGRAM(s) Oral every 4 hours PRN  oxyCODONE    IR 10 milliGRAM(s) Oral every 4 hours PRN  venlafaxine XR. 150 milliGRAM(s) Oral daily    Anticoagulation:  none    OTHER:  influenza   Vaccine 0.5 milliLiter(s) IntraMuscular once  pantoprazole    Tablet 40 milliGRAM(s) Oral before breakfast  polyethylene glycol 3350 17 Gram(s) Oral every 12 hours  senna 2 Tablet(s) Oral at bedtime PRN    IVF:  multivitamin 1 Tablet(s) Oral daily  sodium chloride 0.9%. 1000 milliLiter(s) IV Continuous <Continuous>    CULTURES:  none    RADIOLOGY & ADDITIONAL TESTS:  ct head: stable

## 2021-10-18 NOTE — CONSULT NOTE ADULT - SUBJECTIVE AND OBJECTIVE BOX
Jose Barksdale  Pager [phone]  Office [phone]    HPI:  66F s/p mech fall down stairs, last ASA 3d ago, , pw HA/nausea (concussion sxs), labs wnl, CTH w/ trace R frontal tSAH and L sub occip skull fx, repeat CTH w/ trace inc R frontal tsah, thin bifrontal sdh, and new thin temporal tsah. CTV hypoplastic L TS but open. No other inj. Exam: EOV otherwise neuro intact. GCS 14. (17 Oct 2021 23:18)      PAST MEDICAL & SURGICAL HISTORY:  HLD (hyperlipidemia)    H/O ureterostomy    S/p nephrectomy        Review of Systems:   CONSTITUTIONAL: No fever, weight loss, or fatigue  EYES: No eye pain, visual disturbances, or discharge  ENMT:  No difficulty hearing, tinnitus, vertigo; No sinus or throat pain  NECK: No pain or stiffness  BREASTS: No pain, masses, or nipple discharge  RESPIRATORY: No cough, wheezing, chills or hemoptysis; No shortness of breath  CARDIOVASCULAR: No chest pain, palpitations, dizziness, or leg swelling  GASTROINTESTINAL: No abdominal or epigastric pain. No nausea, vomiting, or hematemesis; No diarrhea or constipation. No melena or hematochezia.  GENITOURINARY: No dysuria, frequency, hematuria, or incontinence  NEUROLOGICAL: No headaches, memory loss, loss of strength, numbness, or tremors  SKIN: No itching, burning, rashes, or lesions   LYMPH NODES: No enlarged glands  ENDOCRINE: No heat or cold intolerance; No hair loss  MUSCULOSKELETAL: No joint pain or swelling; No muscle, back, or extremity pain  PSYCHIATRIC: No depression, anxiety, mood swings, or difficulty sleeping  HEME/LYMPH: No easy bruising, or bleeding gums  ALLERY AND IMMUNOLOGIC: No hives or eczema    Allergies    No Known Allergies    Intolerances        Social History:     FAMILY HISTORY:      MEDICATIONS  (STANDING):  buPROPion XL (24-Hour) . 300 milliGRAM(s) Oral daily  influenza   Vaccine 0.5 milliLiter(s) IntraMuscular once  levETIRAcetam 500 milliGRAM(s) Oral every 12 hours  multivitamin 1 Tablet(s) Oral daily  pantoprazole    Tablet 40 milliGRAM(s) Oral before breakfast  polyethylene glycol 3350 17 Gram(s) Oral every 12 hours  sodium chloride 0.9%. 1000 milliLiter(s) (75 mL/Hr) IV Continuous <Continuous>  venlafaxine XR. 150 milliGRAM(s) Oral daily    MEDICATIONS  (PRN):  acetaminophen   Tablet .. 650 milliGRAM(s) Oral every 6 hours PRN Temp greater or equal to 38C (100.4F), Mild Pain (1 - 3)  aluminum hydroxide/magnesium hydroxide/simethicone Suspension 30 milliLiter(s) Oral once PRN Dyspepsia  ondansetron Injectable 4 milliGRAM(s) IV Push every 6 hours PRN Nausea and/or Vomiting  oxyCODONE    IR 5 milliGRAM(s) Oral every 4 hours PRN Moderate Pain (4 - 6)  oxyCODONE    IR 10 milliGRAM(s) Oral every 4 hours PRN Severe Pain (7 - 10)  senna 2 Tablet(s) Oral at bedtime PRN Constipation      Vital Signs Last 24 Hrs  T(C): 36.7 (18 Oct 2021 08:19), Max: 36.9 (17 Oct 2021 23:15)  T(F): 98 (18 Oct 2021 08:19), Max: 98.5 (17 Oct 2021 23:15)  HR: 78 (18 Oct 2021 08:19) (71 - 88)  BP: 97/61 (18 Oct 2021 08:19) (97/61 - 147/88)  BP(mean): 92 (17 Oct 2021 19:15) (92 - 92)  RR: 17 (18 Oct 2021 08:19) (15 - 18)  SpO2: 95% (18 Oct 2021 08:19) (95% - 99%)  CAPILLARY BLOOD GLUCOSE        I&O's Summary      PHYSICAL EXAM:  GENERAL: NAD, well-developed  HEAD:  Atraumatic, Normocephalic  EYES: EOMI, PERRLA, conjunctiva and sclera clear  NECK: Supple, No JVD  CHEST/LUNG: Clear to auscultation bilaterally; No wheeze  HEART: Regular rate and rhythm; No murmurs, rubs, or gallops  ABDOMEN: Soft, Nontender, Nondistended; Bowel sounds present  EXTREMITIES:  2+ Peripheral Pulses, No clubbing, cyanosis, or edema  PSYCH: AAOx3  NEUROLOGY: non-focal  SKIN: No rashes or lesions    LABS:                        13.7   12.84 )-----------( 251      ( 18 Oct 2021 07:08 )             41.7     10-18    136  |  100  |  18  ----------------------------<  118<H>  4.3   |  23  |  1.13    Ca    9.5      18 Oct 2021 07:08    TPro  7.2  /  Alb  4.3  /  TBili  0.7  /  DBili  x   /  AST  18  /  ALT  13  /  AlkPhos  100  10-18    PT/INR - ( 17 Oct 2021 17:38 )   PT: 12.4 sec;   INR: 1.04 ratio         PTT - ( 17 Oct 2021 17:38 )  PTT:29.7 sec          RADIOLOGY & ADDITIONAL TESTS:    Imaging Personally Reviewed:    Consultant(s) Notes Reviewed:      Care Discussed with Consultants/Other Providers:   Jose Barksdale  Pager [phone]  Office [phone]    CC: MICHAELS/nausea    HPI:  66F h/o renal ca s/p nephrectomy, depression/anxiety p/w HA/nausea after a mechanical fall and was found to have       PAST MEDICAL & SURGICAL HISTORY:  HLD (hyperlipidemia)    H/O ureterostomy    S/p nephrectomy        Review of Systems:   CONSTITUTIONAL: No fever, weight loss, or fatigue  EYES: No eye pain, visual disturbances, or discharge  ENMT:  No difficulty hearing, tinnitus, vertigo; No sinus or throat pain  NECK: No pain or stiffness  BREASTS: No pain, masses, or nipple discharge  RESPIRATORY: No cough, wheezing, chills or hemoptysis; No shortness of breath  CARDIOVASCULAR: No chest pain, palpitations, dizziness, or leg swelling  GASTROINTESTINAL: No abdominal or epigastric pain. No nausea, vomiting, or hematemesis; No diarrhea or constipation. No melena or hematochezia.  GENITOURINARY: No dysuria, frequency, hematuria, or incontinence  NEUROLOGICAL: No headaches, memory loss, loss of strength, numbness, or tremors  SKIN: No itching, burning, rashes, or lesions   LYMPH NODES: No enlarged glands  ENDOCRINE: No heat or cold intolerance; No hair loss  MUSCULOSKELETAL: No joint pain or swelling; No muscle, back, or extremity pain  PSYCHIATRIC: No depression, anxiety, mood swings, or difficulty sleeping  HEME/LYMPH: No easy bruising, or bleeding gums  ALLERY AND IMMUNOLOGIC: No hives or eczema    Allergies    No Known Allergies    Intolerances        Social History:     FAMILY HISTORY:      MEDICATIONS  (STANDING):  buPROPion XL (24-Hour) . 300 milliGRAM(s) Oral daily  influenza   Vaccine 0.5 milliLiter(s) IntraMuscular once  levETIRAcetam 500 milliGRAM(s) Oral every 12 hours  multivitamin 1 Tablet(s) Oral daily  pantoprazole    Tablet 40 milliGRAM(s) Oral before breakfast  polyethylene glycol 3350 17 Gram(s) Oral every 12 hours  sodium chloride 0.9%. 1000 milliLiter(s) (75 mL/Hr) IV Continuous <Continuous>  venlafaxine XR. 150 milliGRAM(s) Oral daily    MEDICATIONS  (PRN):  acetaminophen   Tablet .. 650 milliGRAM(s) Oral every 6 hours PRN Temp greater or equal to 38C (100.4F), Mild Pain (1 - 3)  aluminum hydroxide/magnesium hydroxide/simethicone Suspension 30 milliLiter(s) Oral once PRN Dyspepsia  ondansetron Injectable 4 milliGRAM(s) IV Push every 6 hours PRN Nausea and/or Vomiting  oxyCODONE    IR 5 milliGRAM(s) Oral every 4 hours PRN Moderate Pain (4 - 6)  oxyCODONE    IR 10 milliGRAM(s) Oral every 4 hours PRN Severe Pain (7 - 10)  senna 2 Tablet(s) Oral at bedtime PRN Constipation      Vital Signs Last 24 Hrs  T(C): 36.7 (18 Oct 2021 08:19), Max: 36.9 (17 Oct 2021 23:15)  T(F): 98 (18 Oct 2021 08:19), Max: 98.5 (17 Oct 2021 23:15)  HR: 78 (18 Oct 2021 08:19) (71 - 88)  BP: 97/61 (18 Oct 2021 08:19) (97/61 - 147/88)  BP(mean): 92 (17 Oct 2021 19:15) (92 - 92)  RR: 17 (18 Oct 2021 08:19) (15 - 18)  SpO2: 95% (18 Oct 2021 08:19) (95% - 99%)  CAPILLARY BLOOD GLUCOSE        I&O's Summary      PHYSICAL EXAM:  GENERAL: NAD, well-developed  HEAD:  Atraumatic, Normocephalic  EYES: EOMI, PERRLA, conjunctiva and sclera clear  NECK: Supple, No JVD  CHEST/LUNG: Clear to auscultation bilaterally; No wheeze  HEART: Regular rate and rhythm; No murmurs, rubs, or gallops  ABDOMEN: Soft, Nontender, Nondistended; Bowel sounds present  EXTREMITIES:  2+ Peripheral Pulses, No clubbing, cyanosis, or edema  PSYCH: AAOx3  NEUROLOGY: non-focal  SKIN: No rashes or lesions    LABS:                        13.7   12.84 )-----------( 251      ( 18 Oct 2021 07:08 )             41.7     10-18    136  |  100  |  18  ----------------------------<  118<H>  4.3   |  23  |  1.13    Ca    9.5      18 Oct 2021 07:08    TPro  7.2  /  Alb  4.3  /  TBili  0.7  /  DBili  x   /  AST  18  /  ALT  13  /  AlkPhos  100  10-18    PT/INR - ( 17 Oct 2021 17:38 )   PT: 12.4 sec;   INR: 1.04 ratio         PTT - ( 17 Oct 2021 17:38 )  PTT:29.7 sec          RADIOLOGY & ADDITIONAL TESTS:    Imaging Personally Reviewed:    Consultant(s) Notes Reviewed:      Care Discussed with Consultants/Other Providers:   Jose Barksdale  Pager [phone]  Office [phone]    CC: MICHAELS/nausea    HPI:  66F h/o renal ca s/p nephrectomy, depression/anxiety p/w HA/nausea after a mechanical fall and was found to have SDH and SAH. Pt      PAST MEDICAL & SURGICAL HISTORY:  HLD (hyperlipidemia)    H/O ureterostomy    S/p nephrectomy        Review of Systems:   CONSTITUTIONAL: No fever, weight loss, or fatigue  EYES: No eye pain, visual disturbances, or discharge  ENMT:  No difficulty hearing, tinnitus, vertigo; No sinus or throat pain  NECK: No pain or stiffness  BREASTS: No pain, masses, or nipple discharge  RESPIRATORY: No cough, wheezing, chills or hemoptysis; No shortness of breath  CARDIOVASCULAR: No chest pain, palpitations, dizziness, or leg swelling  GASTROINTESTINAL: No abdominal or epigastric pain. No nausea, vomiting, or hematemesis; No diarrhea or constipation. No melena or hematochezia.  GENITOURINARY: No dysuria, frequency, hematuria, or incontinence  NEUROLOGICAL: No headaches, memory loss, loss of strength, numbness, or tremors  SKIN: No itching, burning, rashes, or lesions   LYMPH NODES: No enlarged glands  ENDOCRINE: No heat or cold intolerance; No hair loss  MUSCULOSKELETAL: No joint pain or swelling; No muscle, back, or extremity pain  PSYCHIATRIC: No depression, anxiety, mood swings, or difficulty sleeping  HEME/LYMPH: No easy bruising, or bleeding gums  ALLERY AND IMMUNOLOGIC: No hives or eczema    Allergies    No Known Allergies    Intolerances        Social History:     FAMILY HISTORY:      MEDICATIONS  (STANDING):  buPROPion XL (24-Hour) . 300 milliGRAM(s) Oral daily  influenza   Vaccine 0.5 milliLiter(s) IntraMuscular once  levETIRAcetam 500 milliGRAM(s) Oral every 12 hours  multivitamin 1 Tablet(s) Oral daily  pantoprazole    Tablet 40 milliGRAM(s) Oral before breakfast  polyethylene glycol 3350 17 Gram(s) Oral every 12 hours  sodium chloride 0.9%. 1000 milliLiter(s) (75 mL/Hr) IV Continuous <Continuous>  venlafaxine XR. 150 milliGRAM(s) Oral daily    MEDICATIONS  (PRN):  acetaminophen   Tablet .. 650 milliGRAM(s) Oral every 6 hours PRN Temp greater or equal to 38C (100.4F), Mild Pain (1 - 3)  aluminum hydroxide/magnesium hydroxide/simethicone Suspension 30 milliLiter(s) Oral once PRN Dyspepsia  ondansetron Injectable 4 milliGRAM(s) IV Push every 6 hours PRN Nausea and/or Vomiting  oxyCODONE    IR 5 milliGRAM(s) Oral every 4 hours PRN Moderate Pain (4 - 6)  oxyCODONE    IR 10 milliGRAM(s) Oral every 4 hours PRN Severe Pain (7 - 10)  senna 2 Tablet(s) Oral at bedtime PRN Constipation      Vital Signs Last 24 Hrs  T(C): 36.7 (18 Oct 2021 08:19), Max: 36.9 (17 Oct 2021 23:15)  T(F): 98 (18 Oct 2021 08:19), Max: 98.5 (17 Oct 2021 23:15)  HR: 78 (18 Oct 2021 08:19) (71 - 88)  BP: 97/61 (18 Oct 2021 08:19) (97/61 - 147/88)  BP(mean): 92 (17 Oct 2021 19:15) (92 - 92)  RR: 17 (18 Oct 2021 08:19) (15 - 18)  SpO2: 95% (18 Oct 2021 08:19) (95% - 99%)  CAPILLARY BLOOD GLUCOSE        I&O's Summary      PHYSICAL EXAM:  GENERAL: NAD, well-developed  HEAD:  Atraumatic, Normocephalic  EYES: EOMI, PERRLA, conjunctiva and sclera clear  NECK: Supple, No JVD  CHEST/LUNG: Clear to auscultation bilaterally; No wheeze  HEART: Regular rate and rhythm; No murmurs, rubs, or gallops  ABDOMEN: Soft, Nontender, Nondistended; Bowel sounds present  EXTREMITIES:  2+ Peripheral Pulses, No clubbing, cyanosis, or edema  PSYCH: AAOx3  NEUROLOGY: non-focal  SKIN: No rashes or lesions    LABS:                        13.7   12.84 )-----------( 251      ( 18 Oct 2021 07:08 )             41.7     10-18    136  |  100  |  18  ----------------------------<  118<H>  4.3   |  23  |  1.13    Ca    9.5      18 Oct 2021 07:08    TPro  7.2  /  Alb  4.3  /  TBili  0.7  /  DBili  x   /  AST  18  /  ALT  13  /  AlkPhos  100  10-18    PT/INR - ( 17 Oct 2021 17:38 )   PT: 12.4 sec;   INR: 1.04 ratio         PTT - ( 17 Oct 2021 17:38 )  PTT:29.7 sec          RADIOLOGY & ADDITIONAL TESTS:    Imaging Personally Reviewed: < from: CT Head No Cont (10.18.21 @ 11:44) >  Similar-appearing thin bifrontal mixed density subdural collections.    Similar minimal right-sided sulcal subarachnoid hemorrhage.    No midline shift or effacement of the basal cisterns. No hydrocephalus.    No parenchymal hemorrhage or brain edema.    No displaced calvarial fracture.    The visualized paranasal sinuses and mastoid air cells are clear.    < end of copied text >      Consultant(s) Notes Reviewed:      Care Discussed with Consultants/Other Providers:   Jose Barksdale  Pager [phone]  Office [phone]    CC: MICHAELS/nausea    HPI:  66F h/o renal ca s/p nephrectomy, depression/anxiety p/w HA/nausea after a mechanical fall and was found to have SDH and SAH c a sub occipital skull fracture. Pt reports that she was walking down the stairs when she had a misstep and fell. Denies any preceding sx and daughter confirms that family member saw the fall without any preceding sx. Pt reports having her Welbutrin increased from 200 to 400 about three days ago. Also reports light-headedness c standing for a few months. Currently c/o unchanged HA and nausea since the fall and light-headedness c standing.       PAST MEDICAL & SURGICAL HISTORY:    Renal cell cancer s/p nephrectomy  Depression  Anxiety  HLD (hyperlipidemia)    H/O ureterostomy    Facelift    Cosmetic under eye surgery b/l        Review of Systems:   CONSTITUTIONAL: No fever, weight loss, or fatigue  EYES: No eye pain, visual disturbances, or discharge  ENMT:  No difficulty hearing, tinnitus, vertigo; No sinus or throat pain  NECK: No pain or stiffness  BREASTS: No pain, masses, or nipple discharge  RESPIRATORY: No cough, wheezing, chills or hemoptysis; No shortness of breath  CARDIOVASCULAR: No chest pain, palpitations, dizziness, or leg swelling  GASTROINTESTINAL: No abdominal or epigastric pain. No nausea, vomiting, or hematemesis; No diarrhea or constipation. No melena or hematochezia.  GENITOURINARY: No dysuria, frequency, hematuria, or incontinence  NEUROLOGICAL: No headaches, memory loss, loss of strength, numbness, or tremors  SKIN: No itching, burning, rashes, or lesions   LYMPH NODES: No enlarged glands  ENDOCRINE: No heat or cold intolerance; No hair loss  MUSCULOSKELETAL: No joint pain or swelling; No muscle, back, or extremity pain  PSYCHIATRIC: No depression, anxiety, mood swings, or difficulty sleeping  HEME/LYMPH: No easy bruising, or bleeding gums  ALLERY AND IMMUNOLOGIC: No hives or eczema    Allergies    No Known Allergies    Intolerances        Social History:     FAMILY HISTORY:      MEDICATIONS  (STANDING):  buPROPion XL (24-Hour) . 300 milliGRAM(s) Oral daily  influenza   Vaccine 0.5 milliLiter(s) IntraMuscular once  levETIRAcetam 500 milliGRAM(s) Oral every 12 hours  multivitamin 1 Tablet(s) Oral daily  pantoprazole    Tablet 40 milliGRAM(s) Oral before breakfast  polyethylene glycol 3350 17 Gram(s) Oral every 12 hours  sodium chloride 0.9%. 1000 milliLiter(s) (75 mL/Hr) IV Continuous <Continuous>  venlafaxine XR. 150 milliGRAM(s) Oral daily    MEDICATIONS  (PRN):  acetaminophen   Tablet .. 650 milliGRAM(s) Oral every 6 hours PRN Temp greater or equal to 38C (100.4F), Mild Pain (1 - 3)  aluminum hydroxide/magnesium hydroxide/simethicone Suspension 30 milliLiter(s) Oral once PRN Dyspepsia  ondansetron Injectable 4 milliGRAM(s) IV Push every 6 hours PRN Nausea and/or Vomiting  oxyCODONE    IR 5 milliGRAM(s) Oral every 4 hours PRN Moderate Pain (4 - 6)  oxyCODONE    IR 10 milliGRAM(s) Oral every 4 hours PRN Severe Pain (7 - 10)  senna 2 Tablet(s) Oral at bedtime PRN Constipation      Vital Signs Last 24 Hrs  T(C): 36.7 (18 Oct 2021 08:19), Max: 36.9 (17 Oct 2021 23:15)  T(F): 98 (18 Oct 2021 08:19), Max: 98.5 (17 Oct 2021 23:15)  HR: 78 (18 Oct 2021 08:19) (71 - 88)  BP: 97/61 (18 Oct 2021 08:19) (97/61 - 147/88)  BP(mean): 92 (17 Oct 2021 19:15) (92 - 92)  RR: 17 (18 Oct 2021 08:19) (15 - 18)  SpO2: 95% (18 Oct 2021 08:19) (95% - 99%)  CAPILLARY BLOOD GLUCOSE        I&O's Summary      PHYSICAL EXAM:  GENERAL: NAD, well-developed  HEAD:  Atraumatic, Normocephalic  EYES: EOMI, PERRLA, conjunctiva and sclera clear  NECK: Supple, No JVD  CHEST/LUNG: Clear to auscultation bilaterally; No wheeze  HEART: Regular rate and rhythm; No murmurs, rubs, or gallops  ABDOMEN: Soft, Nontender, Nondistended; Bowel sounds present  EXTREMITIES:  2+ Peripheral Pulses, No clubbing, cyanosis, or edema  PSYCH: AAOx3  NEUROLOGY: non-focal  SKIN: No rashes or lesions    LABS:                        13.7   12.84 )-----------( 251      ( 18 Oct 2021 07:08 )             41.7     10-18    136  |  100  |  18  ----------------------------<  118<H>  4.3   |  23  |  1.13    Ca    9.5      18 Oct 2021 07:08    TPro  7.2  /  Alb  4.3  /  TBili  0.7  /  DBili  x   /  AST  18  /  ALT  13  /  AlkPhos  100  10-18    PT/INR - ( 17 Oct 2021 17:38 )   PT: 12.4 sec;   INR: 1.04 ratio         PTT - ( 17 Oct 2021 17:38 )  PTT:29.7 sec          RADIOLOGY & ADDITIONAL TESTS:    Imaging Personally Reviewed: < from: CT Head No Cont (10.18.21 @ 11:44) >  Similar-appearing thin bifrontal mixed density subdural collections.    Similar minimal right-sided sulcal subarachnoid hemorrhage.    No midline shift or effacement of the basal cisterns. No hydrocephalus.    No parenchymal hemorrhage or brain edema.    No displaced calvarial fracture.    The visualized paranasal sinuses and mastoid air cells are clear.    < end of copied text >      Consultant(s) Notes Reviewed:      Care Discussed with Consultants/Other Providers:   Jose Barksdale  Pager [phone]  Office [phone]    CC: MICHAELS/nausea    HPI:  66F h/o renal ca s/p nephrectomy, depression/anxiety p/w HA/nausea after a mechanical fall and was found to have SDH and SAH c a sub occipital skull fracture. Pt reports that she was walking down the stairs when she had a misstep and fell. Denies any preceding sx and daughter confirms that family member saw the fall without any preceding sx. Pt reports having her Welbutrin increased from 200 to 400 about three days ago. Also reports light-headedness c standing for a few months. Currently c/o unchanged HA and nausea since the fall and light-headedness c standing.       PAST MEDICAL & SURGICAL HISTORY:    Renal cell cancer s/p nephrectomy  Depression  Anxiety  HLD (hyperlipidemia)    H/O ureterostomy    Facelift    Cosmetic under eye surgery b/l        Review of Systems:   CONSTITUTIONAL: No fever, weight loss, or fatigue  EYES: No eye pain, visual disturbances, or discharge  ENMT:  No difficulty hearing, tinnitus, vertigo; No sinus or throat pain  NECK: No pain or stiffness  BREASTS: No pain, masses, or nipple discharge  RESPIRATORY: No cough, wheezing, chills or hemoptysis; No shortness of breath  CARDIOVASCULAR: No chest pain, palpitations, dizziness, or leg swelling  GASTROINTESTINAL: No abdominal or epigastric pain. No nausea, vomiting, or hematemesis; No diarrhea or constipation. No melena or hematochezia.  GENITOURINARY: No dysuria, frequency, hematuria, or incontinence  NEUROLOGICAL: per HPI  SKIN: No itching, burning, rashes, or lesions   LYMPH NODES: No enlarged glands  ENDOCRINE: No heat or cold intolerance; No hair loss  MUSCULOSKELETAL: No joint pain or swelling; No muscle, back, or extremity pain  PSYCHIATRIC: no SI/HI  HEME/LYMPH: No easy bruising, or bleeding gums  ALLERY AND IMMUNOLOGIC: No hives or eczema    Allergies    No Known Allergies    Social History:     Quit smoking   No ETOH  no illegal drug use    FAMILY HISTORY:  Father  of cad at age 76  Mother  age 85. Had CLL and bronchiectasis      MEDICATIONS  (STANDING):  buPROPion XL (24-Hour) . 300 milliGRAM(s) Oral daily  influenza   Vaccine 0.5 milliLiter(s) IntraMuscular once  levETIRAcetam 500 milliGRAM(s) Oral every 12 hours  multivitamin 1 Tablet(s) Oral daily  pantoprazole    Tablet 40 milliGRAM(s) Oral before breakfast  polyethylene glycol 3350 17 Gram(s) Oral every 12 hours  sodium chloride 0.9%. 1000 milliLiter(s) (75 mL/Hr) IV Continuous <Continuous>  venlafaxine XR. 150 milliGRAM(s) Oral daily    MEDICATIONS  (PRN):  acetaminophen   Tablet .. 650 milliGRAM(s) Oral every 6 hours PRN Temp greater or equal to 38C (100.4F), Mild Pain (1 - 3)  aluminum hydroxide/magnesium hydroxide/simethicone Suspension 30 milliLiter(s) Oral once PRN Dyspepsia  ondansetron Injectable 4 milliGRAM(s) IV Push every 6 hours PRN Nausea and/or Vomiting  oxyCODONE    IR 5 milliGRAM(s) Oral every 4 hours PRN Moderate Pain (4 - 6)  oxyCODONE    IR 10 milliGRAM(s) Oral every 4 hours PRN Severe Pain (7 - 10)  senna 2 Tablet(s) Oral at bedtime PRN Constipation      Vital Signs Last 24 Hrs  T(C): 36.7 (18 Oct 2021 08:19), Max: 36.9 (17 Oct 2021 23:15)  T(F): 98 (18 Oct 2021 08:19), Max: 98.5 (17 Oct 2021 23:15)  HR: 78 (18 Oct 2021 08:19) (71 - 88)  BP: 97/61 (18 Oct 2021 08:19) (97/61 - 147/88)  BP(mean): 92 (17 Oct 2021 19:15) (92 - 92)  RR: 17 (18 Oct 2021 08:19) (15 - 18)  SpO2: 95% (18 Oct 2021 08:19) (95% - 99%)  CAPILLARY BLOOD GLUCOSE        I&O's Summary      PHYSICAL EXAM:  GENERAL: NAD, well-developed  HEAD:  Atraumatic, Normocephalic  EYES: EOMI, PERRLA, conjunctiva and sclera clear  NECK: Supple, No JVD  CHEST/LUNG: Clear to auscultation bilaterally; No wheeze  HEART: Regular rate and rhythm; No murmurs, rubs, or gallops; s1, s2+  ABDOMEN: Soft, Nontender, Nondistended; Bowel sounds present  EXTREMITIES:  2+ Peripheral Pulses, No clubbing, cyanosis, or edema  PSYCH: AAOx3  NEUROLOGY: non-focal  SKIN: No rashes   MSK: FROM with no pain/swelling/erythema    LABS:                        13.7   12.84 )-----------( 251      ( 18 Oct 2021 07:08 )             41.7     10    136  |  100  |  18  ----------------------------<  118<H>  4.3   |  23  |  1.13    Ca    9.5      18 Oct 2021 07:08    TPro  7.2  /  Alb  4.3  /  TBili  0.7  /  DBili  x   /  AST  18  /  ALT  13  /  AlkPhos  100  1018    PT/INR - ( 17 Oct 2021 17:38 )   PT: 12.4 sec;   INR: 1.04 ratio         PTT - ( 17 Oct 2021 17:38 )  PTT:29.7 sec    EKG: NSR c no ischemic changes           RADIOLOGY & ADDITIONAL TESTS:    Imaging Personally Reviewed: < from: CT Head No Cont (10.18.21 @ 11:44) >  Similar-appearing thin bifrontal mixed density subdural collections.    Similar minimal right-sided sulcal subarachnoid hemorrhage.    No midline shift or effacement of the basal cisterns. No hydrocephalus.    No parenchymal hemorrhage or brain edema.    No displaced calvarial fracture.    The visualized paranasal sinuses and mastoid air cells are clear.    < end of copied text >      Consultant(s) Notes Reviewed:      Care Discussed with Consultants/Other Providers: neurosurg

## 2021-10-18 NOTE — OCCUPATIONAL THERAPY INITIAL EVALUATION ADULT - DIAGNOSIS, OT EVAL
Patient presents with decreased balance, Lue coordination, and dizziness  endurance impacting ability to perform ADLs and functional mobility

## 2021-10-18 NOTE — CONSULT NOTE ADULT - PROBLEM SELECTOR RECOMMENDATION 2
by diastolic criteria. risks/benefits of meds d/w and daughter and pt prefers trial of TEDs and PT. Pt and daughter aware that pt cannot get up by herself and will need immediate PMD f/u after rehab

## 2021-10-18 NOTE — PHYSICAL THERAPY INITIAL EVALUATION ADULT - ADDITIONAL COMMENTS
Pt reports she lives in a private house with 3 steps to enter & first floor set up. Pt lives with her daughter who works from home. Pt reports she was independent with all mobility & ADL's prior to admit.

## 2021-10-18 NOTE — PHYSICAL THERAPY INITIAL EVALUATION ADULT - PLANNED THERAPY INTERVENTIONS, PT EVAL
Stair training... GOAL: In 4 weeks pt will negotiate 1 flight of stairs independently with least restrictive device./balance training/bed mobility training/gait training/strengthening/transfer training

## 2021-10-18 NOTE — PHYSICAL THERAPY INITIAL EVALUATION ADULT - DIAGNOSIS, PT EVAL
Pt presents with decreased strength, decreased coordination, decreased balance, and decreased endurance limiting overall independence with mobility.

## 2021-10-18 NOTE — CONSULT NOTE ADULT - PROBLEM SELECTOR RECOMMENDATION 3
Welbutrin and Effexor can cause orthostatic hypotension and Effexor can cause balance impairment. rec psych eval to see if med adjustments are warranted.

## 2021-10-18 NOTE — OCCUPATIONAL THERAPY INITIAL EVALUATION ADULT - ADDITIONAL COMMENTS
Pt reports she lives in a private house with 3 steps to enter & first floor set up. Pt lives with her daughter who works from home. Pt reports she was independent with all mobility & ADL's prior to admit. + shower stall. +seat and gb, No other ae

## 2021-10-18 NOTE — PHYSICAL THERAPY INITIAL EVALUATION ADULT - PERTINENT HX OF CURRENT PROBLEM, REHAB EVAL
66y F with PMH of HLD, HAD, MDD, hiatal hernia & PSgx R renal ca s/p nephrectomy w/ ureterostomy 10/2019. Presents after an unwitnessed fall down 6 steps. Denies hitting her head & was able to ambulate without difficulty however admits poor recollection of the event. Pt also reports fall on stairs day prior 10/17. Imaging: CTH w/ trace R frontal tSAH and L sub occip skull fx, repeat CTH w/ trace inc R frontal tsah, thin bifrontal sdh, and new thin temporal tsah. CTV hypoplastic L TS but open.

## 2021-10-18 NOTE — PROGRESS NOTE ADULT - ASSESSMENT
HPI:  Patient is a 66 year old female that had mechanical fall down stairs and presented with a headache.  Was on aspirin.  CT head done which showed sub occipital skull fracture and tSAH.  Admitted the neurosurgery service for further management.    PLAN:  Neuro:   -q4 hour neuro checks  -repeat ct head stable  -oxycodone for pain control  -continue effexor and wellbutrin  -out of bed with assistance  -pt/ot - acute rehab upon discharge    Respiratory:   -satting well on room air  -incentive spirometer for lung expansion    CV:  -keep sbp 100-140  -orthostatic when out of bed earlier, hospitalist consulted, continue ivf    Endocrine:   -maintain euglycemia    Heme/Onc:    -heparin and SCDs for DVT prophylaxis    Renal:   -continue ivf for orthostasis  -voiding    ID:   -afebrile    GI:   -sodium and cholesterol restricted diet  -senna and miralax for bowel regimen      Spectra [id number]   HPI:  Patient is a 66 year old female that had mechanical fall down stairs and presented with a headache.  Was on aspirin.  CT head done which showed sub occipital skull fracture and tSAH.  Admitted the neurosurgery service for further management.    PLAN:  Neuro:   -q4 hour neuro checks  -repeat ct head stable  -oxycodone for pain control  -continue effexor and wellbutrin  -out of bed with assistance  -pt/ot - acute rehab upon discharge    Respiratory:   -satting well on room air  -incentive spirometer for lung expansion    CV:  -keep sbp 100-140  -orthostatic when out of bed earlier, hospitalist consulted, continue ivf    Endocrine:   -maintain euglycemia    Heme/Onc:    -SCDs only for DVT prophylaxis  -can start chemical dvt prophylaxis 24 hours from stable ct scan as per Dr. Chappell    Renal:   -continue ivf for orthostasis  -voiding    ID:   -afebrile    GI:   -sodium and cholesterol restricted diet  -senna and miralax for bowel regimen      Spectra [phone]

## 2021-10-19 LAB
ANION GAP SERPL CALC-SCNC: 11 MMOL/L — SIGNIFICANT CHANGE UP (ref 5–17)
APPEARANCE UR: CLEAR — SIGNIFICANT CHANGE UP
BACTERIA # UR AUTO: NEGATIVE — SIGNIFICANT CHANGE UP
BILIRUB UR-MCNC: NEGATIVE — SIGNIFICANT CHANGE UP
BUN SERPL-MCNC: 13 MG/DL — SIGNIFICANT CHANGE UP (ref 7–23)
CALCIUM SERPL-MCNC: 8.6 MG/DL — SIGNIFICANT CHANGE UP (ref 8.4–10.5)
CHLORIDE SERPL-SCNC: 101 MMOL/L — SIGNIFICANT CHANGE UP (ref 96–108)
CO2 SERPL-SCNC: 23 MMOL/L — SIGNIFICANT CHANGE UP (ref 22–31)
COLOR SPEC: YELLOW — SIGNIFICANT CHANGE UP
COVID-19 SPIKE DOMAIN AB INTERP: POSITIVE
COVID-19 SPIKE DOMAIN ANTIBODY RESULT: >250 U/ML — HIGH
CREAT SERPL-MCNC: 1.07 MG/DL — SIGNIFICANT CHANGE UP (ref 0.5–1.3)
DIFF PNL FLD: NEGATIVE — SIGNIFICANT CHANGE UP
EPI CELLS # UR: 5 /HPF — SIGNIFICANT CHANGE UP
GLUCOSE SERPL-MCNC: 85 MG/DL — SIGNIFICANT CHANGE UP (ref 70–99)
GLUCOSE UR QL: NEGATIVE — SIGNIFICANT CHANGE UP
HCT VFR BLD CALC: 36.5 % — SIGNIFICANT CHANGE UP (ref 34.5–45)
HGB BLD-MCNC: 11.2 G/DL — LOW (ref 11.5–15.5)
HYALINE CASTS # UR AUTO: 0 /LPF — SIGNIFICANT CHANGE UP (ref 0–2)
KETONES UR-MCNC: NEGATIVE — SIGNIFICANT CHANGE UP
LEUKOCYTE ESTERASE UR-ACNC: NEGATIVE — SIGNIFICANT CHANGE UP
MCHC RBC-ENTMCNC: 29.8 PG — SIGNIFICANT CHANGE UP (ref 27–34)
MCHC RBC-ENTMCNC: 30.7 GM/DL — LOW (ref 32–36)
MCV RBC AUTO: 97.1 FL — SIGNIFICANT CHANGE UP (ref 80–100)
NITRITE UR-MCNC: NEGATIVE — SIGNIFICANT CHANGE UP
NRBC # BLD: 0 /100 WBCS — SIGNIFICANT CHANGE UP (ref 0–0)
PH UR: 6.5 — SIGNIFICANT CHANGE UP (ref 5–8)
PLATELET # BLD AUTO: 196 K/UL — SIGNIFICANT CHANGE UP (ref 150–400)
POTASSIUM SERPL-MCNC: 3.7 MMOL/L — SIGNIFICANT CHANGE UP (ref 3.5–5.3)
POTASSIUM SERPL-SCNC: 3.7 MMOL/L — SIGNIFICANT CHANGE UP (ref 3.5–5.3)
PROT UR-MCNC: ABNORMAL
RBC # BLD: 3.76 M/UL — LOW (ref 3.8–5.2)
RBC # FLD: 14.1 % — SIGNIFICANT CHANGE UP (ref 10.3–14.5)
RBC CASTS # UR COMP ASSIST: 3 /HPF — SIGNIFICANT CHANGE UP (ref 0–4)
SARS-COV-2 IGG+IGM SERPL QL IA: >250 U/ML — HIGH
SARS-COV-2 IGG+IGM SERPL QL IA: POSITIVE
SARS-COV-2 RNA SPEC QL NAA+PROBE: SIGNIFICANT CHANGE UP
SODIUM SERPL-SCNC: 135 MMOL/L — SIGNIFICANT CHANGE UP (ref 135–145)
SP GR SPEC: 1.03 — HIGH (ref 1.01–1.02)
UROBILINOGEN FLD QL: NEGATIVE — SIGNIFICANT CHANGE UP
WBC # BLD: 8.58 K/UL — SIGNIFICANT CHANGE UP (ref 3.8–10.5)
WBC # FLD AUTO: 8.58 K/UL — SIGNIFICANT CHANGE UP (ref 3.8–10.5)
WBC UR QL: 2 /HPF — SIGNIFICANT CHANGE UP (ref 0–5)

## 2021-10-19 PROCEDURE — 99232 SBSQ HOSP IP/OBS MODERATE 35: CPT

## 2021-10-19 PROCEDURE — 99233 SBSQ HOSP IP/OBS HIGH 50: CPT

## 2021-10-19 PROCEDURE — 99222 1ST HOSP IP/OBS MODERATE 55: CPT

## 2021-10-19 RX ORDER — SODIUM CHLORIDE 0.65 %
1 AEROSOL, SPRAY (ML) NASAL THREE TIMES A DAY
Refills: 0 | Status: DISCONTINUED | OUTPATIENT
Start: 2021-10-19 | End: 2021-10-23

## 2021-10-19 RX ORDER — ENOXAPARIN SODIUM 100 MG/ML
40 INJECTION SUBCUTANEOUS
Refills: 0 | Status: DISCONTINUED | OUTPATIENT
Start: 2021-10-19 | End: 2021-10-23

## 2021-10-19 RX ADMIN — ONDANSETRON 4 MILLIGRAM(S): 8 TABLET, FILM COATED ORAL at 16:21

## 2021-10-19 RX ADMIN — LEVETIRACETAM 500 MILLIGRAM(S): 250 TABLET, FILM COATED ORAL at 05:51

## 2021-10-19 RX ADMIN — ONDANSETRON 4 MILLIGRAM(S): 8 TABLET, FILM COATED ORAL at 01:14

## 2021-10-19 RX ADMIN — Medication 1 TABLET(S): at 12:22

## 2021-10-19 RX ADMIN — POLYETHYLENE GLYCOL 3350 17 GRAM(S): 17 POWDER, FOR SOLUTION ORAL at 05:51

## 2021-10-19 RX ADMIN — Medication 1 SPRAY(S): at 21:32

## 2021-10-19 RX ADMIN — Medication 150 MILLIGRAM(S): at 12:22

## 2021-10-19 RX ADMIN — LEVETIRACETAM 500 MILLIGRAM(S): 250 TABLET, FILM COATED ORAL at 18:37

## 2021-10-19 RX ADMIN — SODIUM CHLORIDE 75 MILLILITER(S): 9 INJECTION INTRAMUSCULAR; INTRAVENOUS; SUBCUTANEOUS at 12:47

## 2021-10-19 RX ADMIN — PANTOPRAZOLE SODIUM 40 MILLIGRAM(S): 20 TABLET, DELAYED RELEASE ORAL at 06:40

## 2021-10-19 RX ADMIN — Medication 1 SPRAY(S): at 13:19

## 2021-10-19 RX ADMIN — Medication 650 MILLIGRAM(S): at 12:23

## 2021-10-19 RX ADMIN — POLYETHYLENE GLYCOL 3350 17 GRAM(S): 17 POWDER, FOR SOLUTION ORAL at 18:37

## 2021-10-19 RX ADMIN — ENOXAPARIN SODIUM 40 MILLIGRAM(S): 100 INJECTION SUBCUTANEOUS at 18:37

## 2021-10-19 NOTE — BH CONSULTATION LIAISON ASSESSMENT NOTE - RISK ASSESSMENT
Risk factors: + recent loss    Protective factors: no current SIIP/HIIP, no h/o SA/SIB, no h/o psych admissions, no access to weapons, no active substance abuse, good physical health,  spirituality, domiciled, social supports, positive therapeutic relationship, engaged in treatment, compliant with treatment, help-seeking behaviors    Overall, pt is at low risk of harm and does not meet criteria for psychiatric admission.

## 2021-10-19 NOTE — BH CONSULTATION LIAISON ASSESSMENT NOTE - CASE SUMMARY
Pt is a 65 y/o F domiciled alone w/ pphx of depression on Effexor and Wellbutrin and pmh of HLD here for headache s/p fall. Psychiatry consulted for med management. Pt concerned that home psych meds are causing orthostatic hypotension. On interview, patient revealed that she has been taking Effexor and Wellbutrin for several years, however her psychiatrist, Dr. López, asked her to increase the dosage of wellbutrin from 200 mg daily to 450 mg daily because patient mentioned that her mood was still depressed. Patient is also taking effexor 75 mg bid. Patient reported that she had periods of time when she felt dizzy and fell before titrating up the dosage of wellbutrin but thought the frequency increased after the dose increase. Patient's mother passed away 3 years ago and  passed away 6 years ago. Patient revealed that she has had depression since they  because they were a major part of her support system. Patient's mood has improved since starting the current medications that she's taking but says she has accepted that she will probably never feel completely happy. rec cont current meds, unlikely to be contributing to dizziness, hypotension. monitor symptoms furhter

## 2021-10-19 NOTE — BH CONSULTATION LIAISON ASSESSMENT NOTE - NSBHCHARTREVIEWVS_PSY_A_CORE FT
Vital Signs Last 24 Hrs  T(C): 36.7 (19 Oct 2021 11:35), Max: 36.8 (18 Oct 2021 17:21)  T(F): 98.1 (19 Oct 2021 11:35), Max: 98.3 (18 Oct 2021 17:21)  HR: 66 (19 Oct 2021 11:35) (66 - 84)  BP: 119/77 (19 Oct 2021 11:35) (103/51 - 121/74)  BP(mean): --  RR: 18 (19 Oct 2021 11:35) (17 - 18)  SpO2: 96% (19 Oct 2021 11:35) (96% - 99%)

## 2021-10-19 NOTE — BH CONSULTATION LIAISON ASSESSMENT NOTE - NSBHCONSULTRECOMMENDOTHER_PSY_A_CORE FT
- Recommend continuing home medications at current doses (Wellbutrin 400 mg and Effexor 75 mg bid). Wellbutrin and Effexor unlikely to be causing orthostatic hypotension but can cause dizziness

## 2021-10-19 NOTE — PROGRESS NOTE ADULT - ASSESSMENT
66F h/o renal ca s/p nephrectomy, depression/anxiety p/w HA/nausea after a mechanical fall and was found to have SDH and SAH c a sub occipital skull fracture. +orthostatic hypotension by diastolic criteria

## 2021-10-19 NOTE — PROGRESS NOTE ADULT - PROBLEM SELECTOR PLAN 2
RASHEEDA compression stockings, exercises before standing reinforced. declines meds offered. Wellbutrin dose was doubled, two-three days before admission

## 2021-10-19 NOTE — PROGRESS NOTE ADULT - SUBJECTIVE AND OBJECTIVE BOX
HPI:  Patient is a 66 year old female that had mechanical fall down stairs and presented with a headache.  Was on aspirin.  CT head done which showed sub occipital skull fracture and tSAH.  Admitted the neurosurgery service for further management.    OVERNIGHT EVENTS:  No acute events overnight.  Headaches controlled on current regimen.  Hospitalist following for orthostatic hypotension, on ivf.  Has been out of bed with pt.  Tolerating diet.    Vital Signs Last 24 Hrs  T(C): 36.7 (19 Oct 2021 09:09), Max: 36.8 (18 Oct 2021 17:21)  T(F): 98 (19 Oct 2021 09:09), Max: 98.3 (18 Oct 2021 17:21)  HR: 72 (19 Oct 2021 09:09) (66 - 84)  BP: 116/72 (19 Oct 2021 09:09) (103/51 - 121/74)  BP(mean): --  RR: 17 (19 Oct 2021 09:09) (17 - 18)  SpO2: 96% (19 Oct 2021 09:09) (96% - 99%)    I&O's Detail    18 Oct 2021 07:01  -  19 Oct 2021 07:00  --------------------------------------------------------  IN:    sodium chloride 0.9%: 525 mL  Total IN: 525 mL    OUT:  Total OUT: 0 mL    Total NET: 525 mL      19 Oct 2021 07:01  -  19 Oct 2021 09:38  --------------------------------------------------------  IN:    Oral Fluid: 240 mL  Total IN: 240 mL    OUT:  Total OUT: 0 mL    Total NET: 240 mL        I&O's Summary    18 Oct 2021 07:01  -  19 Oct 2021 07:00  --------------------------------------------------------  IN: 525 mL / OUT: 0 mL / NET: 525 mL    19 Oct 2021 07:01  -  19 Oct 2021 09:38  --------------------------------------------------------  IN: 240 mL / OUT: 0 mL / NET: 240 mL        PHYSICAL EXAM:  Neurological: awake, alert, oriented x3, follows commands, speech clear and fluent, perrl, eomi, face symmetric, tongue midline, no drift b/l, moves all extremities x4 w/ 5/5 strength throughout, sensation present, intact, equal throughout    Cardiovascular: +s1, s2  Respiratory: clear to auscultation b/l  Gastrointestinal: soft, non-distended, non-tender  Genitourinary: +voiding    TUBES/LINES:  [x] none    DIET:  [x] regular      LABS:                        11.2   8.58  )-----------( 196      ( 19 Oct 2021 06:32 )             36.5     10-19    135  |  101  |  13  ----------------------------<  85  3.7   |  23  |  1.07    Ca    8.6      19 Oct 2021 06:32    TPro  7.2  /  Alb  4.3  /  TBili  0.7  /  DBili  x   /  AST  18  /  ALT  13  /  AlkPhos  100  10-18    PT/INR - ( 17 Oct 2021 17:38 )   PT: 12.4 sec;   INR: 1.04 ratio         PTT - ( 17 Oct 2021 17:38 )  PTT:29.7 sec          Allergies    No Known Allergies        MEDICATIONS:  Antibiotics:  none    Neuro:  acetaminophen   Tablet .. 650 milliGRAM(s) Oral every 6 hours PRN  buPROPion SR (12-Hour) 400 milliGRAM(s) Oral daily  levETIRAcetam 500 milliGRAM(s) Oral every 12 hours  ondansetron Injectable 4 milliGRAM(s) IV Push every 6 hours PRN  oxyCODONE    IR 5 milliGRAM(s) Oral every 4 hours PRN  oxyCODONE    IR 10 milliGRAM(s) Oral every 4 hours PRN  venlafaxine XR. 150 milliGRAM(s) Oral daily    Anticoagulation:    OTHER:  influenza   Vaccine 0.5 milliLiter(s) IntraMuscular once  pantoprazole    Tablet 40 milliGRAM(s) Oral before breakfast  polyethylene glycol 3350 17 Gram(s) Oral every 12 hours  senna 2 Tablet(s) Oral at bedtime PRN  sodium chloride 0.65% Nasal 1 Spray(s) Both Nostrils three times a day    IVF:  multivitamin 1 Tablet(s) Oral daily  sodium chloride 0.9%. 1000 milliLiter(s) IV Continuous <Continuous>    CULTURES:  none    RADIOLOGY & ADDITIONAL TESTS:  no new imaging

## 2021-10-19 NOTE — BH CONSULTATION LIAISON ASSESSMENT NOTE - HPI (INCLUDE ILLNESS QUALITY, SEVERITY, DURATION, TIMING, CONTEXT, MODIFYING FACTORS, ASSOCIATED SIGNS AND SYMPTOMS)
Pt is a 65 y/o F domiciled alone w/ pphx of depression on Effexor and Wellbutrin and pmh of HLD here for headache s/p fall. Psychiatry consulted for med management. Pt concerned that home psych meds are causing orthostatic hypotension. On interview, patient revealed that she has been taking Effexor and Wellbutrin for several years, however her psychiatrist asked her to increase the dosage of wellbutrin from 200 mg daily to 450 mg daily because patient mentioned that her mood was still depressed. Patient is also taking effexor 75 mg bid. Patient reported that she had periods of time when she felt dizzy and fell before titrating up the dosage of wellbutrin but thought the frequency increased after the dose increase. Patient's mother passed away 3 years ago and  passed away 6 years ago. Patient revealed that she has had depression since they  because they were a major part of her support stated that she has had depression since her mother passed away 3 years ago, and her   Pt is a 67 y/o F domiciled alone w/ pphx of depression on Effexor and Wellbutrin and pmh of HLD here for headache s/p fall. Psychiatry consulted for med management. Pt concerned that home psych meds are causing orthostatic hypotension. On interview, patient revealed that she has been taking Effexor and Wellbutrin for several years, however her psychiatrist, Dr. López, asked her to increase the dosage of wellbutrin from 200 mg daily to 450 mg daily because patient mentioned that her mood was still depressed. Patient is also taking effexor 75 mg bid. Patient reported that she had periods of time when she felt dizzy and fell before titrating up the dosage of wellbutrin but thought the frequency increased after the dose increase. Patient's mother passed away 3 years ago and  passed away 6 years ago. Patient revealed that she has had depression since they  because they were a major part of her support system. Patient's mood has improved since starting the current medications that she's taking but says she has accepted that she will probably never feel completely happy. Patient has a positive therapeutic relationship with her current therapist and has an upcoming appointment at the end of October. Patient denies SI/HI, AVH.

## 2021-10-19 NOTE — BH CONSULTATION LIAISON ASSESSMENT NOTE - CURRENT MEDICATION
MEDICATIONS  (STANDING):  buPROPion SR (12-Hour) 400 milliGRAM(s) Oral daily  influenza   Vaccine 0.5 milliLiter(s) IntraMuscular once  levETIRAcetam 500 milliGRAM(s) Oral every 12 hours  multivitamin 1 Tablet(s) Oral daily  pantoprazole    Tablet 40 milliGRAM(s) Oral before breakfast  polyethylene glycol 3350 17 Gram(s) Oral every 12 hours  sodium chloride 0.65% Nasal 1 Spray(s) Both Nostrils three times a day  sodium chloride 0.9%. 1000 milliLiter(s) (75 mL/Hr) IV Continuous <Continuous>  venlafaxine XR. 150 milliGRAM(s) Oral daily    MEDICATIONS  (PRN):  acetaminophen   Tablet .. 650 milliGRAM(s) Oral every 6 hours PRN Temp greater or equal to 38C (100.4F), Mild Pain (1 - 3)  ondansetron Injectable 4 milliGRAM(s) IV Push every 6 hours PRN Nausea and/or Vomiting  oxyCODONE    IR 5 milliGRAM(s) Oral every 4 hours PRN Moderate Pain (4 - 6)  oxyCODONE    IR 10 milliGRAM(s) Oral every 4 hours PRN Severe Pain (7 - 10)  senna 2 Tablet(s) Oral at bedtime PRN Constipation

## 2021-10-19 NOTE — CONSULT NOTE ADULT - SUBJECTIVE AND OBJECTIVE BOX
Patient is a 66y old  Female who presents with a chief complaint of ICH (18 Oct 2021 16:57)    Admission HPI:  66F s/p mech fall down stairs, last ASA 3d ago, , pw HA/nausea (concussion sxs), labs wnl, CTH w/ trace R frontal tSAH and L sub occip skull fx, repeat CTH w/ trace inc R frontal tsah, thin bifrontal sdh, and new thin temporal tsah. CTV hypoplastic L TS but open. No other inj. Exam: EOV otherwise neuro intact. GCS 14. (17 Oct 2021 23:18)    Interval History:  Evaluated by neurosx- no surgical intervention.  Repeat imaging with No significant interval change  Course has also been complicated by orthostatic hypotension.    REVIEW OF SYSTEMS: No chest pain, shortness of breath, nausea, vomiting or diarhea; other ROS neg     PAST MEDICAL & SURGICAL HISTORY  HLD (hyperlipidemia)  H/O ureterostomy  S/p nephrectomy    FUNCTIONAL HISTORY:   Lives w daughter in home w 3 CHERI  PTA Independent    CURRENT FUNCTIONAL STATUS:  CG transfers, min A gait    FAMILY HISTORY   Neg    MEDICATIONS   acetaminophen   Tablet .. 650 milliGRAM(s) Oral every 6 hours PRN  buPROPion SR (12-Hour) 400 milliGRAM(s) Oral daily  influenza   Vaccine 0.5 milliLiter(s) IntraMuscular once  levETIRAcetam 500 milliGRAM(s) Oral every 12 hours  multivitamin 1 Tablet(s) Oral daily  ondansetron Injectable 4 milliGRAM(s) IV Push every 6 hours PRN  oxyCODONE    IR 5 milliGRAM(s) Oral every 4 hours PRN  oxyCODONE    IR 10 milliGRAM(s) Oral every 4 hours PRN  pantoprazole    Tablet 40 milliGRAM(s) Oral before breakfast  polyethylene glycol 3350 17 Gram(s) Oral every 12 hours  senna 2 Tablet(s) Oral at bedtime PRN  sodium chloride 0.65% Nasal 1 Spray(s) Both Nostrils three times a day  sodium chloride 0.9%. 1000 milliLiter(s) IV Continuous <Continuous>  venlafaxine XR. 150 milliGRAM(s) Oral daily    ALLERGIES  No Known Allergies    VITALS  T(C): 36.7 (10-19-21 @ 09:09), Max: 36.8 (10-18-21 @ 17:21)  HR: 72 (10-19-21 @ 09:09) (66 - 84)  BP: 116/72 (10-19-21 @ 09:09) (103/51 - 121/74)  RR: 17 (10-19-21 @ 09:09) (17 - 18)  SpO2: 96% (10-19-21 @ 09:09) (96% - 99%)  Wt(kg): --    PHYSICAL EXAM  Constitutional - NAD, Comfortable  HEENT - NCAT, EOMI  Neck - Supple, No limited ROM  Chest - CTA bilaterally, No wheeze, No rhonchi, No crackles  Cardiovascular - RRR, S1S2, No murmurs  Abdomen - BS+, Soft, NTND  Extremities - No C/C/E, No calf tenderness   Neurologic Exam -                    Cognitive - Awake, Alert, AAO to self, place, date, year, situation     Communication - Fluent, No dysarthria, no aphasia     Cranial Nerves - CN 2-12 intact     Motor - No focal deficits      Sensory - Intact to LT     Reflexes - DTR Intact, No primitive reflexive  Psychiatric - Mood stable, Affect WNL    RECENT LABS/IMAGING  CBC Full  -  ( 19 Oct 2021 06:32 )  WBC Count : 8.58 K/uL  RBC Count : 3.76 M/uL  Hemoglobin : 11.2 g/dL  Hematocrit : 36.5 %  Platelet Count - Automated : 196 K/uL  Mean Cell Volume : 97.1 fl  Mean Cell Hemoglobin : 29.8 pg  Mean Cell Hemoglobin Concentration : 30.7 gm/dL  Auto Neutrophil # : x  Auto Lymphocyte # : x  Auto Monocyte # : x  Auto Eosinophil # : x  Auto Basophil # : x  Auto Neutrophil % : x  Auto Lymphocyte % : x  Auto Monocyte % : x  Auto Eosinophil % : x  Auto Basophil % : x    10-19    135  |  101  |  13  ----------------------------<  85  3.7   |  23  |  1.07    Ca    8.6      19 Oct 2021 06:32    TPro  7.2  /  Alb  4.3  /  TBili  0.7  /  DBili  x   /  AST  18  /  ALT  13  /  AlkPhos  100  10-18    Impression:  65 yo with functional deficits secondary to diagnosis of TBIm SAH, orthostatic hypotension    Plan:  PT- ROM, Bed Mob, Transfers, Amb w AD and bracing as needed  OT- ADLs, bracing  SLP- Dysphagia eval and treat  Prec- Falls, Cardiac  DVT Prophylaxis- SCDs  Skin- Turn q2 h  Dispo-  Patient is a 66y old  Female who presents with a chief complaint of ICH (18 Oct 2021 16:57)    Admission HPI:  66F s/p mech fall down stairs, last ASA 3d ago, , pw HA/nausea (concussion sxs), labs wnl, CTH w/ trace R frontal tSAH and L sub occip skull fx, repeat CTH w/ trace inc R frontal tsah, thin bifrontal sdh, and new thin temporal tsah. CTV hypoplastic L TS but open. No other inj. Exam: EOV otherwise neuro intact. GCS 14. (17 Oct 2021 23:18)    Interval History:  Evaluated by neurosx- no surgical intervention.  Repeat imaging with No significant interval change  Course has also been complicated by orthostatic hypotension.    REVIEW OF SYSTEMS: + HA, + visual deficits, + poor balance No chest pain, shortness of breath, nausea, vomiting or diarhea; other ROS neg     PAST MEDICAL & SURGICAL HISTORY  HLD (hyperlipidemia)  H/O ureterostomy  S/p nephrectomy    FUNCTIONAL HISTORY:   Lives w daughter in home w 3 CHERI  PTA Independent    CURRENT FUNCTIONAL STATUS:  CG transfers, min A gait    FAMILY HISTORY   Neg    MEDICATIONS   acetaminophen   Tablet .. 650 milliGRAM(s) Oral every 6 hours PRN  buPROPion SR (12-Hour) 400 milliGRAM(s) Oral daily  influenza   Vaccine 0.5 milliLiter(s) IntraMuscular once  levETIRAcetam 500 milliGRAM(s) Oral every 12 hours  multivitamin 1 Tablet(s) Oral daily  ondansetron Injectable 4 milliGRAM(s) IV Push every 6 hours PRN  oxyCODONE    IR 5 milliGRAM(s) Oral every 4 hours PRN  oxyCODONE    IR 10 milliGRAM(s) Oral every 4 hours PRN  pantoprazole    Tablet 40 milliGRAM(s) Oral before breakfast  polyethylene glycol 3350 17 Gram(s) Oral every 12 hours  senna 2 Tablet(s) Oral at bedtime PRN  sodium chloride 0.65% Nasal 1 Spray(s) Both Nostrils three times a day  sodium chloride 0.9%. 1000 milliLiter(s) IV Continuous <Continuous>  venlafaxine XR. 150 milliGRAM(s) Oral daily    ALLERGIES  No Known Allergies    VITALS  T(C): 36.7 (10-19-21 @ 09:09), Max: 36.8 (10-18-21 @ 17:21)  HR: 72 (10-19-21 @ 09:09) (66 - 84)  BP: 116/72 (10-19-21 @ 09:09) (103/51 - 121/74)  RR: 17 (10-19-21 @ 09:09) (17 - 18)  SpO2: 96% (10-19-21 @ 09:09) (96% - 99%)  Wt(kg): --    PHYSICAL EXAM  Constitutional - NAD, Comfortable  HEENT - NCAT, EOMI  Neck - Supple, No limited ROM  Chest - CTA bilaterally, No wheeze, No rhonchi, No crackles  Cardiovascular - RRR, S1S2, No murmurs  Abdomen - BS+, Soft, NTND  Extremities - No C/C/E, No calf tenderness   Neurologic Exam -                  AAO x 3  Motor non-focal  Can spell world backwards  Recall 2/3  No clonus  Psychiatric - Mood stable, Affect WNL    RECENT LABS/IMAGING  CBC Full  -  ( 19 Oct 2021 06:32 )  WBC Count : 8.58 K/uL  RBC Count : 3.76 M/uL  Hemoglobin : 11.2 g/dL  Hematocrit : 36.5 %  Platelet Count - Automated : 196 K/uL  Mean Cell Volume : 97.1 fl  Mean Cell Hemoglobin : 29.8 pg  Mean Cell Hemoglobin Concentration : 30.7 gm/dL  Auto Neutrophil # : x  Auto Lymphocyte # : x  Auto Monocyte # : x  Auto Eosinophil # : x  Auto Basophil # : x  Auto Neutrophil % : x  Auto Lymphocyte % : x  Auto Monocyte % : x  Auto Eosinophil % : x  Auto Basophil % : x    10-19    135  |  101  |  13  ----------------------------<  85  3.7   |  23  |  1.07    Ca    8.6      19 Oct 2021 06:32    TPro  7.2  /  Alb  4.3  /  TBili  0.7  /  DBili  x   /  AST  18  /  ALT  13  /  AlkPhos  100  10-18    Impression:  65 yo with functional deficits secondary to diagnosis of TBIm SAH, orthostatic hypotension    Plan:  PT- ROM, Bed Mob, Transfers, Amb w AD and bracing as needed  OT- ADLs, bracing  SLP- Dysphagia eval and treat  Prec- Falls, Cardiac  DVT Prophylaxis- SCDs  Skin- Turn q2 h  Dispo- Acute Rehab- can tolerate 3h/d of therapies and requires daily physician visits  D/W patient and her daughter rehab options

## 2021-10-19 NOTE — PROGRESS NOTE ADULT - ASSESSMENT
HPI:  Patient is a 66 year old female that had mechanical fall down stairs and presented with a headache.  Was on aspirin.  CT head done which showed sub occipital skull fracture and tSAH.  Admitted the neurosurgery service for further management.    PLAN:  Neuro:   -q4 hour neuro checks  -repeat ct head stable  -oxycodone for pain control  -continue effexor and wellbutrin, will call psych to see if possible to taper one of these off  -out of bed with assistance  -pt/ot - acute rehab upon discharge  -pm+r consult pending    Respiratory:   -satting well on room air  -incentive spirometer for lung expansion    CV:  -keep sbp 100-140  -orthostatic when out of bed earlier, hospitalist consulted, continue ivf    Endocrine:   -maintain euglycemia    Heme/Onc:    -will start prophylactic lovenox today, continue SCDs only for DVT prophylaxis    Renal:   -continue ivf for orthostasis  -voiding    ID:   -afebrile    GI:   -sodium and cholesterol restricted diet  -senna and miralax for bowel regimen      Spectra [phone]

## 2021-10-19 NOTE — PROGRESS NOTE ADULT - SUBJECTIVE AND OBJECTIVE BOX
Jose Arthuypauline   Pager [phone]  Office [phone]      CC: Patient is a 66y old  Female who presents with a chief complaint of ICH (18 Oct 2021 16:57)      SUBJECTIVE / OVERNIGHT EVENTS:    MEDICATIONS  (STANDING):  buPROPion SR (12-Hour) 400 milliGRAM(s) Oral daily  influenza   Vaccine 0.5 milliLiter(s) IntraMuscular once  levETIRAcetam 500 milliGRAM(s) Oral every 12 hours  multivitamin 1 Tablet(s) Oral daily  pantoprazole    Tablet 40 milliGRAM(s) Oral before breakfast  polyethylene glycol 3350 17 Gram(s) Oral every 12 hours  sodium chloride 0.65% Nasal 1 Spray(s) Both Nostrils three times a day  sodium chloride 0.9%. 1000 milliLiter(s) (75 mL/Hr) IV Continuous <Continuous>  venlafaxine XR. 150 milliGRAM(s) Oral daily    MEDICATIONS  (PRN):  acetaminophen   Tablet .. 650 milliGRAM(s) Oral every 6 hours PRN Temp greater or equal to 38C (100.4F), Mild Pain (1 - 3)  ondansetron Injectable 4 milliGRAM(s) IV Push every 6 hours PRN Nausea and/or Vomiting  oxyCODONE    IR 5 milliGRAM(s) Oral every 4 hours PRN Moderate Pain (4 - 6)  oxyCODONE    IR 10 milliGRAM(s) Oral every 4 hours PRN Severe Pain (7 - 10)  senna 2 Tablet(s) Oral at bedtime PRN Constipation      Vital Signs Last 24 Hrs  T(C): 36.7 (19 Oct 2021 09:09), Max: 36.8 (18 Oct 2021 17:21)  T(F): 98 (19 Oct 2021 09:09), Max: 98.3 (18 Oct 2021 17:21)  HR: 72 (19 Oct 2021 09:09) (66 - 84)  BP: 116/72 (19 Oct 2021 09:09) (103/51 - 121/74)  BP(mean): --  RR: 17 (19 Oct 2021 09:09) (17 - 18)  SpO2: 96% (19 Oct 2021 09:09) (96% - 99%)  CAPILLARY BLOOD GLUCOSE        I&O's Summary    18 Oct 2021 07:01  -  19 Oct 2021 07:00  --------------------------------------------------------  IN: 525 mL / OUT: 0 mL / NET: 525 mL    19 Oct 2021 07:01  -  19 Oct 2021 10:52  --------------------------------------------------------  IN: 240 mL / OUT: 0 mL / NET: 240 mL      tele:    PHYSICAL EXAM:    GENERAL: NAD   HEENT: EOMI, PERRL  PULM: Clear to auscultation bilaterally  CV: Regular rate and rhythm; nl S1, S2; No murmurs, rubs, or gallops  ABDOMEN: Soft, Nontender, Nondistended; Bowel sounds present  EXTREMITIES/MSK:  No edema, calf tenderness   PSYCH: AAOx3  NEUROLOGY: non-focal          LABS:                        11.2   8.58  )-----------( 196      ( 19 Oct 2021 06:32 )             36.5     10-19    135  |  101  |  13  ----------------------------<  85  3.7   |  23  |  1.07    Ca    8.6      19 Oct 2021 06:32    TPro  7.2  /  Alb  4.3  /  TBili  0.7  /  DBili  x   /  AST  18  /  ALT  13  /  AlkPhos  100  10-18    PT/INR - ( 17 Oct 2021 17:38 )   PT: 12.4 sec;   INR: 1.04 ratio         PTT - ( 17 Oct 2021 17:38 )  PTT:29.7 sec            RADIOLOGY & ADDITIONAL TESTS:    Imaging Personally Reviewed:    Consultant(s) Notes Reviewed:      Care Discussed with Consultants/Other Providers:   Jose Barksdale   Pager [phone]  Office [phone]      CC: Patient is a 66y old  Female who presents with a chief complaint of ICH (18 Oct 2021 16:57)      SUBJECTIVE / OVERNIGHT EVENTS: Improved nausea and HAs. No f/c/r. No light-headedness but hasnt walked around yet. no f/c/r. no n/v/d/cp/sob.    MEDICATIONS  (STANDING):  buPROPion SR (12-Hour) 400 milliGRAM(s) Oral daily  influenza   Vaccine 0.5 milliLiter(s) IntraMuscular once  levETIRAcetam 500 milliGRAM(s) Oral every 12 hours  multivitamin 1 Tablet(s) Oral daily  pantoprazole    Tablet 40 milliGRAM(s) Oral before breakfast  polyethylene glycol 3350 17 Gram(s) Oral every 12 hours  sodium chloride 0.65% Nasal 1 Spray(s) Both Nostrils three times a day  sodium chloride 0.9%. 1000 milliLiter(s) (75 mL/Hr) IV Continuous <Continuous>  venlafaxine XR. 150 milliGRAM(s) Oral daily    MEDICATIONS  (PRN):  acetaminophen   Tablet .. 650 milliGRAM(s) Oral every 6 hours PRN Temp greater or equal to 38C (100.4F), Mild Pain (1 - 3)  ondansetron Injectable 4 milliGRAM(s) IV Push every 6 hours PRN Nausea and/or Vomiting  oxyCODONE    IR 5 milliGRAM(s) Oral every 4 hours PRN Moderate Pain (4 - 6)  oxyCODONE    IR 10 milliGRAM(s) Oral every 4 hours PRN Severe Pain (7 - 10)  senna 2 Tablet(s) Oral at bedtime PRN Constipation      Vital Signs Last 24 Hrs  T(C): 36.7 (19 Oct 2021 09:09), Max: 36.8 (18 Oct 2021 17:21)  T(F): 98 (19 Oct 2021 09:09), Max: 98.3 (18 Oct 2021 17:21)  HR: 72 (19 Oct 2021 09:09) (66 - 84)  BP: 116/72 (19 Oct 2021 09:09) (103/51 - 121/74)  BP(mean): --  RR: 17 (19 Oct 2021 09:09) (17 - 18)  SpO2: 96% (19 Oct 2021 09:09) (96% - 99%)  CAPILLARY BLOOD GLUCOSE        I&O's Summary    18 Oct 2021 07:01  -  19 Oct 2021 07:00  --------------------------------------------------------  IN: 525 mL / OUT: 0 mL / NET: 525 mL    19 Oct 2021 07:01  -  19 Oct 2021 10:52  --------------------------------------------------------  IN: 240 mL / OUT: 0 mL / NET: 240 mL      tele: nsr    PHYSICAL EXAM:    GENERAL: NAD   HEENT: EOMI, PERRL  PULM: Clear to auscultation bilaterally  CV: Regular rate and rhythm; nl S1, S2; No murmurs, rubs, or gallops  ABDOMEN: Soft, Nontender, Nondistended; Bowel sounds present  EXTREMITIES/MSK:  No edema, calf tenderness   PSYCH: AAOx3  NEUROLOGY: non-focal          LABS:                        11.2   8.58  )-----------( 196      ( 19 Oct 2021 06:32 )             36.5     10-19    135  |  101  |  13  ----------------------------<  85  3.7   |  23  |  1.07    Ca    8.6      19 Oct 2021 06:32    TPro  7.2  /  Alb  4.3  /  TBili  0.7  /  DBili  x   /  AST  18  /  ALT  13  /  AlkPhos  100  10-18    PT/INR - ( 17 Oct 2021 17:38 )   PT: 12.4 sec;   INR: 1.04 ratio         PTT - ( 17 Oct 2021 17:38 )  PTT:29.7 sec            RADIOLOGY & ADDITIONAL TESTS:    Imaging Personally Reviewed:    Consultant(s) Notes Reviewed:      Care Discussed with Consultants/Other Providers: neurosurg

## 2021-10-19 NOTE — BH CONSULTATION LIAISON ASSESSMENT NOTE - SUMMARY
Pt is a 65 y/o F domiciled alone w/ pphx of depression on Effexor and Wellbutrin and pmh of HLD here for headache s/p fall. Psychiatry consulted for med management. Pt concerned that home psych meds are causing orthostatic hypotension. On interview, patient revealed that she has been taking Effexor and Wellbutrin for several years, however her psychiatrist, Dr. López, asked her to increase the dosage of wellbutrin from 200 mg daily to 450 mg daily because patient mentioned that her mood was still depressed. Patient is also taking effexor 75 mg bid. Patient reported that she had periods of time when she felt dizzy and fell before titrating up the dosage of wellbutrin but thought the frequency increased after the dose increase. Patient's mother passed away 3 years ago and  passed away 6 years ago. Patient revealed that she has had depression since they  because they were a major part of her support system. Patient's mood has improved since starting the current medications that she's taking but says she has accepted that she will probably never feel completely happy. Patient has a positive therapeutic relationship with her current therapist and has an upcoming appointment at the end of October. Patient denies SI/HI, AVH.

## 2021-10-19 NOTE — BH CONSULTATION LIAISON ASSESSMENT NOTE - NSBHCHARTREVIEWINVESTIGATE_PSY_A_CORE FT
Ventricular Rate 75 BPM    Atrial Rate 75 BPM    P-R Interval 144 ms    QRS Duration 82 ms    Q-T Interval 400 ms    QTC Calculation(Bazett) 446 ms    P Axis 33 degrees    R Axis 20 degrees    T Axis 25 degrees    Diagnosis Line NORMAL SINUS RHYTHM  NORMAL ECG  NO PREVIOUS ECGS AVAILABLE  Confirmed by MD Betsy, [place] ([id number]) on 10/18/2021 11:11:46 AM

## 2021-10-20 ENCOUNTER — TRANSCRIPTION ENCOUNTER (OUTPATIENT)
Age: 66
End: 2021-10-20

## 2021-10-20 DIAGNOSIS — E87.6 HYPOKALEMIA: ICD-10-CM

## 2021-10-20 DIAGNOSIS — R42 DIZZINESS AND GIDDINESS: ICD-10-CM

## 2021-10-20 DIAGNOSIS — E83.51 HYPOCALCEMIA: ICD-10-CM

## 2021-10-20 DIAGNOSIS — H81.10 BENIGN PAROXYSMAL VERTIGO, UNSPECIFIED EAR: ICD-10-CM

## 2021-10-20 DIAGNOSIS — Z29.9 ENCOUNTER FOR PROPHYLACTIC MEASURES, UNSPECIFIED: ICD-10-CM

## 2021-10-20 LAB
ANION GAP SERPL CALC-SCNC: 12 MMOL/L — SIGNIFICANT CHANGE UP (ref 5–17)
BUN SERPL-MCNC: 12 MG/DL — SIGNIFICANT CHANGE UP (ref 7–23)
CALCIUM SERPL-MCNC: 7.8 MG/DL — LOW (ref 8.4–10.5)
CHLORIDE SERPL-SCNC: 104 MMOL/L — SIGNIFICANT CHANGE UP (ref 96–108)
CO2 SERPL-SCNC: 19 MMOL/L — LOW (ref 22–31)
CREAT SERPL-MCNC: 1 MG/DL — SIGNIFICANT CHANGE UP (ref 0.5–1.3)
GLUCOSE SERPL-MCNC: 86 MG/DL — SIGNIFICANT CHANGE UP (ref 70–99)
HCT VFR BLD CALC: 33.6 % — LOW (ref 34.5–45)
HGB BLD-MCNC: 10.5 G/DL — LOW (ref 11.5–15.5)
MCHC RBC-ENTMCNC: 29.7 PG — SIGNIFICANT CHANGE UP (ref 27–34)
MCHC RBC-ENTMCNC: 31.3 GM/DL — LOW (ref 32–36)
MCV RBC AUTO: 95.2 FL — SIGNIFICANT CHANGE UP (ref 80–100)
NRBC # BLD: 0 /100 WBCS — SIGNIFICANT CHANGE UP (ref 0–0)
PLATELET # BLD AUTO: 189 K/UL — SIGNIFICANT CHANGE UP (ref 150–400)
POTASSIUM SERPL-MCNC: 3.4 MMOL/L — LOW (ref 3.5–5.3)
POTASSIUM SERPL-SCNC: 3.4 MMOL/L — LOW (ref 3.5–5.3)
RBC # BLD: 3.53 M/UL — LOW (ref 3.8–5.2)
RBC # FLD: 13.7 % — SIGNIFICANT CHANGE UP (ref 10.3–14.5)
SODIUM SERPL-SCNC: 135 MMOL/L — SIGNIFICANT CHANGE UP (ref 135–145)
WBC # BLD: 8.4 K/UL — SIGNIFICANT CHANGE UP (ref 3.8–10.5)
WBC # FLD AUTO: 8.4 K/UL — SIGNIFICANT CHANGE UP (ref 3.8–10.5)

## 2021-10-20 PROCEDURE — 70450 CT HEAD/BRAIN W/O DYE: CPT | Mod: 26

## 2021-10-20 PROCEDURE — 99222 1ST HOSP IP/OBS MODERATE 55: CPT

## 2021-10-20 PROCEDURE — 99233 SBSQ HOSP IP/OBS HIGH 50: CPT

## 2021-10-20 RX ORDER — SENNA PLUS 8.6 MG/1
2 TABLET ORAL AT BEDTIME
Refills: 0 | Status: DISCONTINUED | OUTPATIENT
Start: 2021-10-20 | End: 2021-10-23

## 2021-10-20 RX ORDER — POTASSIUM CHLORIDE 20 MEQ
20 PACKET (EA) ORAL
Refills: 0 | Status: COMPLETED | OUTPATIENT
Start: 2021-10-20 | End: 2021-10-20

## 2021-10-20 RX ORDER — CALCIUM GLUCONATE 100 MG/ML
2 VIAL (ML) INTRAVENOUS ONCE
Refills: 0 | Status: COMPLETED | OUTPATIENT
Start: 2021-10-20 | End: 2021-10-20

## 2021-10-20 RX ORDER — MECLIZINE HCL 12.5 MG
12.5 TABLET ORAL THREE TIMES A DAY
Refills: 0 | Status: DISCONTINUED | OUTPATIENT
Start: 2021-10-20 | End: 2021-10-23

## 2021-10-20 RX ORDER — BUPROPION HYDROCHLORIDE 150 MG/1
150 TABLET, EXTENDED RELEASE ORAL DAILY
Refills: 0 | Status: DISCONTINUED | OUTPATIENT
Start: 2021-10-20 | End: 2021-10-23

## 2021-10-20 RX ORDER — LANOLIN ALCOHOL/MO/W.PET/CERES
3 CREAM (GRAM) TOPICAL AT BEDTIME
Refills: 0 | Status: DISCONTINUED | OUTPATIENT
Start: 2021-10-20 | End: 2021-10-23

## 2021-10-20 RX ADMIN — PANTOPRAZOLE SODIUM 40 MILLIGRAM(S): 20 TABLET, DELAYED RELEASE ORAL at 06:46

## 2021-10-20 RX ADMIN — Medication 1 SPRAY(S): at 05:18

## 2021-10-20 RX ADMIN — Medication 1 SPRAY(S): at 10:06

## 2021-10-20 RX ADMIN — Medication 150 MILLIGRAM(S): at 12:13

## 2021-10-20 RX ADMIN — SODIUM CHLORIDE 75 MILLILITER(S): 9 INJECTION INTRAMUSCULAR; INTRAVENOUS; SUBCUTANEOUS at 00:10

## 2021-10-20 RX ADMIN — Medication 20 MILLIEQUIVALENT(S): at 09:40

## 2021-10-20 RX ADMIN — Medication 1 SPRAY(S): at 15:14

## 2021-10-20 RX ADMIN — LEVETIRACETAM 500 MILLIGRAM(S): 250 TABLET, FILM COATED ORAL at 05:18

## 2021-10-20 RX ADMIN — Medication 200 GRAM(S): at 09:41

## 2021-10-20 RX ADMIN — ENOXAPARIN SODIUM 40 MILLIGRAM(S): 100 INJECTION SUBCUTANEOUS at 16:51

## 2021-10-20 RX ADMIN — Medication 650 MILLIGRAM(S): at 03:30

## 2021-10-20 RX ADMIN — Medication 5 MILLIGRAM(S): at 16:50

## 2021-10-20 RX ADMIN — POLYETHYLENE GLYCOL 3350 17 GRAM(S): 17 POWDER, FOR SOLUTION ORAL at 05:18

## 2021-10-20 RX ADMIN — Medication 1 TABLET(S): at 12:13

## 2021-10-20 RX ADMIN — LEVETIRACETAM 500 MILLIGRAM(S): 250 TABLET, FILM COATED ORAL at 16:50

## 2021-10-20 RX ADMIN — Medication 650 MILLIGRAM(S): at 02:46

## 2021-10-20 RX ADMIN — POLYETHYLENE GLYCOL 3350 17 GRAM(S): 17 POWDER, FOR SOLUTION ORAL at 16:50

## 2021-10-20 RX ADMIN — Medication 20 MILLIEQUIVALENT(S): at 12:15

## 2021-10-20 RX ADMIN — Medication 20 MILLIEQUIVALENT(S): at 15:14

## 2021-10-20 NOTE — BH CONSULTATION LIAISON PROGRESS NOTE - NSBHASSESSMENTFT_PSY_ALL_CORE
Pt is a 67 y/o F domiciled alone w/ pphx of depression on Effexor and Wellbutrin and pmh of HLD here for headache s/p fall. Psychiatry consulted for med management. Pt concerned that home psych meds are causing orthostatic hypotension. On interview, patient revealed that she has been taking Effexor and Wellbutrin for several years, however her psychiatrist, Dr. López, asked her to increase the dosage of wellbutrin from 200 mg daily to 450 mg daily because patient mentioned that her mood was still depressed. Patient is also taking effexor 75 mg bid. Patient reported that she had periods of time when she felt dizzy and fell before titrating up the dosage of wellbutrin but thought the frequency increased after the dose increase. Patient's mother passed away 3 years ago and  passed away 6 years ago. Patient revealed that she has had depression since they  because they were a major part of her support system. Patient's mood has improved since starting the current medications that she's taking but says she has accepted that she will probably never feel completely happy. Patient has a positive therapeutic relationship with her current therapist and has an upcoming appointment at the end of October. Patient denies SI/HI, AVH.

## 2021-10-20 NOTE — PROGRESS NOTE ADULT - ASSESSMENT
HPI:  Patient is a 66 year old female that had mechanical fall down stairs and presented with a headache.  Was on aspirin.  CT head done which showed sub occipital skull fracture and tSAH.  Admitted the neurosurgery service for further management.

## 2021-10-20 NOTE — BH CONSULTATION LIAISON PROGRESS NOTE - CASE SUMMARY
Pt is a 65 y/o F domiciled alone w/ pphx of depression on Effexor and Wellbutrin and pmh of HLD here for headache s/p fall. Psychiatry consulted for med management. Pt concerned that home psych meds are causing orthostatic hypotension. On interview, patient revealed that she has been taking Effexor and Wellbutrin for several years, however her psychiatrist, Dr. López, asked her to increase the dosage of wellbutrin from 200 mg daily to 450 mg daily because patient mentioned that her mood was still depressed. Patient was also taking Effexor 75mg BID. Patient reported that she had periods of time when she felt dizzy and fell before titrating up the dosage of wellbutrin but thought the frequency increased after the dose increase. Patient's mother passed away 3 years ago and  passed away 6 years ago. Patient revealed that she has had depression since they  because they were a major part of her support system. Patient's mood has improved since starting the current medications that she's taking but says she has accepted that she will probably never feel completely happy. Patient has a positive therapeutic relationship with her current therapist and has an upcoming appointment at the end of October. Patient denies SI/HI, AVH. PT was comfortable with decreasing both the Effexor and the Welbutrin Xr and may continue   - Wellbutrin  mg daily  - Effexor  mg daily  - Melatonin 3 mg nightly

## 2021-10-20 NOTE — BH CONSULTATION LIAISON PROGRESS NOTE - NSBHCHARTREVIEWVS_PSY_A_CORE FT
Vital Signs Last 24 Hrs  T(C): 36.5 (20 Oct 2021 12:08), Max: 36.7 (19 Oct 2021 20:31)  T(F): 97.7 (20 Oct 2021 12:08), Max: 98 (19 Oct 2021 20:31)  HR: 64 (20 Oct 2021 12:08) (64 - 69)  BP: 133/85 (20 Oct 2021 12:08) (102/68 - 133/85)  BP(mean): --  RR: 18 (20 Oct 2021 12:08) (18 - 18)  SpO2: 95% (20 Oct 2021 12:08) (95% - 99%)

## 2021-10-20 NOTE — CONSULT NOTE ADULT - SUBJECTIVE AND OBJECTIVE BOX
Admitting Diagnosis:  Traumatic subarachnoid hemorrhage with loss of consciousness of unspecified duration, initial encounter [S06.6X9A]  TRAUM SUBRAC HEM W LOC OF UNSP DURATION, INIT        HPI:    66 year old woman s/p mechanical fall down the stairs, previously on aspirin, CTH w/ trace R frontal tSAH and L sub occip skull fx, repeat CTH w/ trace inc R frontal tsah, thin bifrontal sdh, and new thin temporal tsah. CTV hypoplastic L TS but open.  No other injuries.  Neurologically intact.  For the past few weeks she has been complaining of vertigo when she stands up.  She never gets this woods she is sitting, and after standing for a while it goes away.  Per report here has mild orthostatic hypotension with diastolic BP.  She also was concerned about possible multiple sclerosis based on family history.    ******    Past Medical History:  HLD (hyperlipidemia) [E78.5]    H/O ureterostomy [Z98.890]        Past Surgical History:  No significant past surgical history [[id number]]    S/p nephrectomy [Z90.5]        Social History:  No toxic habits    Family History:  FAMILY HISTORY:      Allergies:  No Known Allergies      ROS:  Constitutional: Patient offers no complaints of fevers or significant weight loss  Ears, Nose, Mouth and Throat: The patient presents with no abnormalities of the head, ears, eyes, nose or throat  Skin: Patient offers no concerns of new rashes or lesions  Respiratory: The patient presents with no abnormalities of the respiratory tract  Cardiovascular: The patient presents with no cardiac abnormalities  Gastrointestinal: The patient presents with no abnormalities of the GI system  Genitourinary: The patient presents with no dysuria, hematuria or frequent urination  Neurological: See HPI  Endocrine: Patient offers no complaints of excessive thirst, urination, or heat/cold intolerance    Advanced care planning reviewed and noted in the chart.    Medications:  acetaminophen   Tablet .. 650 milliGRAM(s) Oral every 6 hours PRN  bisacodyl 5 milliGRAM(s) Oral every 12 hours  buPROPion SR (12-Hour) 400 milliGRAM(s) Oral daily  enoxaparin Injectable 40 milliGRAM(s) SubCutaneous <User Schedule>  influenza   Vaccine 0.5 milliLiter(s) IntraMuscular once  levETIRAcetam 500 milliGRAM(s) Oral every 12 hours  multivitamin 1 Tablet(s) Oral daily  ondansetron Injectable 4 milliGRAM(s) IV Push every 6 hours PRN  oxyCODONE    IR 5 milliGRAM(s) Oral every 4 hours PRN  oxyCODONE    IR 10 milliGRAM(s) Oral every 4 hours PRN  pantoprazole    Tablet 40 milliGRAM(s) Oral before breakfast  polyethylene glycol 3350 17 Gram(s) Oral every 12 hours  potassium chloride    Tablet ER 20 milliEquivalent(s) Oral every 2 hours  senna 2 Tablet(s) Oral at bedtime  sodium chloride 0.65% Nasal 1 Spray(s) Both Nostrils three times a day  sodium chloride 0.9%. 1000 milliLiter(s) IV Continuous <Continuous>  venlafaxine XR. 150 milliGRAM(s) Oral daily      Labs:  CBC Full  -  ( 20 Oct 2021 06:15 )  WBC Count : 8.40 K/uL  RBC Count : 3.53 M/uL  Hemoglobin : 10.5 g/dL  Hematocrit : 33.6 %  Platelet Count - Automated : 189 K/uL  Mean Cell Volume : 95.2 fl  Mean Cell Hemoglobin : 29.7 pg  Mean Cell Hemoglobin Concentration : 31.3 gm/dL  Auto Neutrophil # : x  Auto Lymphocyte # : x  Auto Monocyte # : x  Auto Eosinophil # : x  Auto Basophil # : x  Auto Neutrophil % : x  Auto Lymphocyte % : x  Auto Monocyte % : x  Auto Eosinophil % : x  Auto Basophil % : x    10-20    135  |  104  |  12  ----------------------------<  86  3.4<L>   |  19<L>  |  1.00    Ca    7.8<L>      20 Oct 2021 06:15      CAPILLARY BLOOD GLUCOSE            Urinalysis Basic - ( 19 Oct 2021 13:25 )    Color: Yellow / Appearance: Clear / S.032 / pH: x  Gluc: x / Ketone: Negative  / Bili: Negative / Urobili: Negative   Blood: x / Protein: Trace / Nitrite: Negative   Leuk Esterase: Negative / RBC: 3 /hpf / WBC 2 /HPF   Sq Epi: x / Non Sq Epi: 5 /hpf / Bacteria: Negative      Female    Vitals:  Vital Signs Last 24 Hrs  T(C): 36.5 (20 Oct 2021 12:08), Max: 36.7 (19 Oct 2021 20:31)  T(F): 97.7 (20 Oct 2021 12:08), Max: 98 (19 Oct 2021 20:31)  HR: 64 (20 Oct 2021 12:08) (64 - 69)  BP: 133/85 (20 Oct 2021 12:08) (102/68 - 133/85)  BP(mean): --  RR: 18 (20 Oct 2021 12:08) (18 - 18)  SpO2: 95% (20 Oct 2021 12:08) (95% - 99%)    NEUROLOGICAL EXAM:    Mental status: Awake, alert, and in no apparent distress. Oriented to person, place and time. Language function is normal. Recent memory, digit span and concentration were normal.     Cranial Nerves: Pupils were equal, round, reactive to light. Extraocular movements were intact. Visual field were full. Fundoscopic exam was deferred. Facial sensation was intact to light touch. There was no facial asymmetry. The palate was upgoing symmetrically and tongue was midline. Hearing acuity was intact to finger rub AU. Shoulder shrug was full bilaterally    Motor exam: Bulk and tone were normal. Strength was 5/5 in all four extremities. Fine finger movements were symmetric and normal. There was no pronator drift    Reflexes: 2+ in the bilateral upper extremities. 2+ in the bilateral lower extremities. Toes were downgoing bilaterally.     Sensation: Intact to light touch, temperature, vibration and proprioception.     Coordination: Finger-nose-finger and heel-to-shin was without dysmetria.     Gait: declined to ambulate given resent fall and ICH

## 2021-10-20 NOTE — CONSULT NOTE ADULT - PROBLEM SELECTOR RECOMMENDATION 9
- Recommend starting meclizine  - Recommend vestibular rehab   - No further ENT intervention at this time   - Call with questions or concerns - Discussed epley maneuver, she did not want to attempt due to back issues, will give her exercises to do at her own paise   - Recommend vestibular rehab   - Call with questions or concerns

## 2021-10-20 NOTE — BH CONSULTATION LIAISON PROGRESS NOTE - NSBHCHARTREVIEWINVESTIGATE_PSY_A_CORE FT
Ventricular Rate 75 BPM    Atrial Rate 75 BPM    P-R Interval 144 ms    QRS Duration 82 ms    Q-T Interval 400 ms    QTC Calculation(Bazett) 446 ms    P Axis 33 degrees    R Axis 20 degrees    T Axis 25 degrees    Diagnosis Line NORMAL SINUS RHYTHM  NORMAL ECG  NO PREVIOUS ECGS AVAILABLE  Confirmed by MD Betsy, [place] ([id number]) on 10/18/2021 11:11:46 AM

## 2021-10-20 NOTE — DISCHARGE NOTE PROVIDER - HOSPITAL COURSE
A 66 year old female presented with headache and nausea s/p mechanical fall down stairs. Patient took last ASA 3 days ago. ARU upon admission was 632. Patient was admitted to neurosurgery on 10/17/2021 for R frontal tSAH, bifrontal sdh, and Lt suboccipital skull fracture. 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 Serial CT scans of head were performed during this admission with the last stable one on 10/20. Hospitalist consult was appreciated. Patient was advised to have RASHEEDA stocking and hydration for orthostatic hypotension. Psychiatry consult was appreciated and adjusted her anti-depressant giving her dizziness/vertigo after fall. Neurology and ENT consults were also appreciated for vertigo. Patient was started on meclizine as needed for dizziness and recommended with vestibular rehab.     Physical therapy and occupational therapy evaluations were appreciated and recommended acute rehab. On the day of discharge, patient was medically cleared and stable for discharge.

## 2021-10-20 NOTE — DISCHARGE NOTE PROVIDER - PROVIDER TOKENS
PROVIDER:[TOKEN:[[id number]],FOLLOWUP:[1 week]],PROVIDER:[TOKEN:[[id number]],FOLLOWUP:[Routine]],PROVIDER:[TOKEN:[[id number]],FOLLOWUP:[Routine]]

## 2021-10-20 NOTE — DISCHARGE NOTE PROVIDER - NSDCCPCAREPLAN_GEN_ALL_CORE_FT
PRINCIPAL DISCHARGE DIAGNOSIS  Diagnosis: Traumatic subarachnoid hemorrhage  Assessment and Plan of Treatment: - Currently stable.  - Do not restart your Aspirin or take any Motrin/NSAIDS until checking with your Neurosurgeon as it can potentially increase risk of brain bleeding.  - Please take over the counter stool softeners/laxatives to prevent constipation.  - Please continue Keppra to prevent seizures.  - Return to Emergency Department or contact your Neurosurgeon if any changes in mental status, severe headache, weakness, gait instability, seizures, numbness or tingling of extremities; difficulty swallowing; drainage or redness of wound, high fever, unrelenting vomiting; pain in legs; difficulty urinating or constipation.  - Please follow up with Dr. Chappell within 1 week after discharge from rehab.        SECONDARY DISCHARGE DIAGNOSES  Diagnosis: Vertigo  Assessment and Plan of Treatment: - You had neurology and ENT evaluations during this admission.  - You can take meclizine as needed.  - You can follow up with neurology and ENT as outpatient.    Diagnosis: Orthostatic hypotension  Assessment and Plan of Treatment: - You can wear RASHEEDA stocking and keep hydration to avoid orthostatic hypotension.  - Please follow up with your primary care physician within 1 week after discharge from rehab.    Diagnosis: Calvarial fracture  Assessment and Plan of Treatment: - currently stable    Diagnosis: Other depression  Assessment and Plan of Treatment: - Your antidepressant dose was changed during this admission. Please follow up with your psychologist within 1 week after discharge from rehab.

## 2021-10-20 NOTE — DISCHARGE NOTE PROVIDER - NSDCMRMEDTOKEN_GEN_ALL_CORE_FT
Effexor XR 75 mg oral capsule, extended release: 2 cap(s) orally once a day  Wellbutrin  mg/12 hours oral tablet, extended release: 2 tab(s) orally once a day

## 2021-10-20 NOTE — BH CONSULTATION LIAISON PROGRESS NOTE - NS ED B[facility] REVIEW OF ED CHART VITAL SIGNS REVIEWED
cont w/ Heparin Drip, monitor PTT/INR Q6hrs, bleeding precautions  CHADSVASC: 3  Cardiology consult appreciated.   f/u Stress test and echo results Yes

## 2021-10-20 NOTE — DISCHARGE NOTE PROVIDER - CARE PROVIDER_API CALL
Ulisses Chappell)  Neurosurgery  [address], [address]  [place] [address]  Phone: [phone]  Fax: [phone]  Follow Up Time: 1 week    Martinez Lewis (DO)  Neurology  [address], [address]  [place] [address]  Phone: [phone]  Fax: [phone]  Follow Up Time: Routine    Pepe Hernandez)  Otolaryngology  [address], [address]  [place] [address]  Phone: [phone]  Fax: [phone]  Follow Up Time: Routine

## 2021-10-20 NOTE — BH CONSULTATION LIAISON PROGRESS NOTE - CURRENT MEDICATION
MEDICATIONS  (STANDING):  bisacodyl 5 milliGRAM(s) Oral every 12 hours  buPROPion XL (24-Hour) . 150 milliGRAM(s) Oral daily  enoxaparin Injectable 40 milliGRAM(s) SubCutaneous <User Schedule>  influenza   Vaccine 0.5 milliLiter(s) IntraMuscular once  levETIRAcetam 500 milliGRAM(s) Oral every 12 hours  multivitamin 1 Tablet(s) Oral daily  pantoprazole    Tablet 40 milliGRAM(s) Oral before breakfast  polyethylene glycol 3350 17 Gram(s) Oral every 12 hours  senna 2 Tablet(s) Oral at bedtime  sodium chloride 0.65% Nasal 1 Spray(s) Both Nostrils three times a day  sodium chloride 0.9%. 1000 milliLiter(s) (75 mL/Hr) IV Continuous <Continuous>  venlafaxine XR. 150 milliGRAM(s) Oral daily    MEDICATIONS  (PRN):  acetaminophen   Tablet .. 650 milliGRAM(s) Oral every 6 hours PRN Temp greater or equal to 38C (100.4F), Mild Pain (1 - 3)  melatonin 3 milliGRAM(s) Oral at bedtime PRN Insomnia  ondansetron Injectable 4 milliGRAM(s) IV Push every 6 hours PRN Nausea and/or Vomiting  oxyCODONE    IR 5 milliGRAM(s) Oral every 4 hours PRN Moderate Pain (4 - 6)  oxyCODONE    IR 10 milliGRAM(s) Oral every 4 hours PRN Severe Pain (7 - 10)

## 2021-10-20 NOTE — DISCHARGE NOTE PROVIDER - NSDCACTIVITY_GEN_ALL_CORE
Do not drive or operate machinery/Showering allowed/Do not make important decisions/Walking - Indoors allowed/No heavy lifting/straining/Walking - Outdoors allowed/Follow Instructions Provided by your Surgical Team

## 2021-10-20 NOTE — CONSULT NOTE ADULT - SUBJECTIVE AND OBJECTIVE BOX
CC: dizziness     HPI: 66F s/p mech fall down stairs, last ASA 3d ago, , pw HA/nausea (concussion sxs), labs wnl, CTH w/ trace R frontal tSAH and L sub occip skull fx, repeat CTH w/ trace inc R frontal tsah, thin bifrontal sdh, and new thin temporal tsah. Now presenting with dizziness. Pt states that the dizziness is room spinning and lasts a few seconds at a time. States that it's worse with head movement. Pt reports minimal decreased hearing to certain frequencies but grossly intact. Pt reports nausea but denies vomiting. Denies tinnitus, ear pain, congestion, recent URI, otorrhea, hx of sx or trauma or recent travel.       PAST MEDICAL & SURGICAL HISTORY:  HLD (hyperlipidemia)    H/O ureterostomy    S/p nephrectomy      Allergies    No Known Allergies    Intolerances      MEDICATIONS  (STANDING):  bisacodyl 5 milliGRAM(s) Oral every 12 hours  buPROPion XL (24-Hour) . 150 milliGRAM(s) Oral daily  enoxaparin Injectable 40 milliGRAM(s) SubCutaneous <User Schedule>  influenza   Vaccine 0.5 milliLiter(s) IntraMuscular once  levETIRAcetam 500 milliGRAM(s) Oral every 12 hours  multivitamin 1 Tablet(s) Oral daily  pantoprazole    Tablet 40 milliGRAM(s) Oral before breakfast  polyethylene glycol 3350 17 Gram(s) Oral every 12 hours  senna 2 Tablet(s) Oral at bedtime  sodium chloride 0.65% Nasal 1 Spray(s) Both Nostrils three times a day  sodium chloride 0.9%. 1000 milliLiter(s) (75 mL/Hr) IV Continuous <Continuous>  venlafaxine XR. 150 milliGRAM(s) Oral daily    MEDICATIONS  (PRN):  acetaminophen   Tablet .. 650 milliGRAM(s) Oral every 6 hours PRN Temp greater or equal to 38C (100.4F), Mild Pain (1 - 3)  melatonin 3 milliGRAM(s) Oral at bedtime PRN Insomnia  ondansetron Injectable 4 milliGRAM(s) IV Push every 6 hours PRN Nausea and/or Vomiting  oxyCODONE    IR 5 milliGRAM(s) Oral every 4 hours PRN Moderate Pain (4 - 6)  oxyCODONE    IR 10 milliGRAM(s) Oral every 4 hours PRN Severe Pain (7 - 10)      Social History: no pertinent social history    Family history: no pertinent family history     ROS:   ENT: all negative except as noted in HPI   CV: denies palpitations  Pulm: denies SOB, cough, hemoptysis  GI: denies change in appetite, indigestion, n/v  : denies pertinent urinary symptoms, urgency  Neuro: denies numbness/tingling, loss of sensation  Psych: denies anxiety  MS: denies muscle weakness, instability  Heme: denies easy bruising or bleeding  Endo: denies heat/cold intolerance, excessive sweating  Vascular: denies LE edema    Vital Signs Last 24 Hrs  T(C): 36.5 (20 Oct 2021 12:08), Max: 36.7 (19 Oct 2021 20:31)  T(F): 97.7 (20 Oct 2021 12:08), Max: 98 (19 Oct 2021 20:31)  HR: 64 (20 Oct 2021 12:08) (64 - 69)  BP: 133/85 (20 Oct 2021 12:08) (102/68 - 133/85)  BP(mean): --  RR: 18 (20 Oct 2021 12:08) (18 - 18)  SpO2: 95% (20 Oct 2021 12:08) (95% - 99%)                          10.5   8.40  )-----------( 189      ( 20 Oct 2021 06:15 )             33.6    10-20    135  |  104  |  12  ----------------------------<  86  3.4<L>   |  19<L>  |  1.00    Ca    7.8<L>      20 Oct 2021 06:15         PHYSICAL EXAM:  Gen: NAD  Skin: No rashes, bruises, or lesions  Head: Normocephalic, Atraumatic  Face: no edema, erythema, or fluctuance. Parotid glands soft without mass  Eyes: +right lateral nystagmus on [place]ara-hallpike. no scleral injection  Ears: Right - ear canal clear, TM intact without effusion or erythema. No evidence of any fluid drainage. No mastoid tenderness, erythema, or ear bulging            Left - ear canal clear, TM intact without effusion or erythema. No evidence of any fluid drainage. No mastoid tenderness, erythema, or ear bulging  Nose: Nares bilaterally patent, no discharge  Mouth: No Stridor / Drooling / Trismus.  Mucosa moist, tongue/uvula midline, oropharynx clear  Neck: Flat, supple, no lymphadenopathy, trachea midline, no masses  Lymphatic: No lymphadenopathy  Resp: breathing easily, no stridor  CV: no peripheral edema/cyanosis  GI: nondistended   Peripheral vascular: no JVD or edema  Neuro: facial nerve intact, no facial droop        Procedures:  [place]-hallpike maneuver performed which showed lateral nystagmus when evaluating the right side.

## 2021-10-20 NOTE — PROGRESS NOTE ADULT - SUBJECTIVE AND OBJECTIVE BOX
Patient was seen at bedside this am. She said she still had vertigo and would like neurology workup for MS given there is family history of MS from her cousin. Denied any headache, cp, sob, n/v, or abd pain.    OVERNIGHT EVENTS: No acute event overnight    Vital Signs Last 24 Hrs  T(C): 36.5 (20 Oct 2021 07:16), Max: 36.7 (19 Oct 2021 20:31)  T(F): 97.7 (20 Oct 2021 07:16), Max: 98 (19 Oct 2021 20:31)  HR: 66 (20 Oct 2021 07:16) (66 - 69)  BP: 113/70 (20 Oct 2021 07:16) (102/68 - 121/82)  BP(mean): --  RR: 18 (20 Oct 2021 07:16) (18 - 18)  SpO2: 96% (20 Oct 2021 07:16) (96% - 99%)    I&O's Detail    19 Oct 2021 07:  -  20 Oct 2021 07:00  --------------------------------------------------------  IN:    Oral Fluid: 720 mL    sodium chloride 0.9%: 975 mL  Total IN: 1695 mL    OUT:    Voided (mL): 800 mL  Total OUT: 800 mL    Total NET: 895 mL      20 Oct 2021 07:  -  20 Oct 2021 11:44  --------------------------------------------------------  IN:    Oral Fluid: 240 mL  Total IN: 240 mL    OUT:  Total OUT: 0 mL    Total NET: 240 mL        I&O's Summary    19 Oct 2021 07:  -  20 Oct 2021 07:00  --------------------------------------------------------  IN: 1695 mL / OUT: 800 mL / NET: 895 mL    20 Oct 2021 07:01  -  20 Oct 2021 11:44  --------------------------------------------------------  IN: 240 mL / OUT: 0 mL / NET: 240 mL        PHYSICAL EXAM:  Neurological:  awake, alert, oriented to person, place, and date, PERRL, face symmetrical, following commands, moving all extremities 5/5, no drift, sensation intact to light touch  Cardiovascular: +s1, s2  Respiratory: clear to auscultation b/l  Gastrointestinal: soft, non-distended, non-tender  Genitourinary: voiding  Extremities: warm dry    LABS:                        10.5   8.40  )-----------( 189      ( 20 Oct 2021 06:15 )             33.6     10-20    135  |  104  |  12  ----------------------------<  86  3.4<L>   |  19<L>  |  1.00    Ca    7.8<L>      20 Oct 2021 06:15        Urinalysis Basic - ( 19 Oct 2021 13:25 )    Color: Yellow / Appearance: Clear / S.032 / pH: x  Gluc: x / Ketone: Negative  / Bili: Negative / Urobili: Negative   Blood: x / Protein: Trace / Nitrite: Negative   Leuk Esterase: Negative / RBC: 3 /hpf / WBC 2 /HPF   Sq Epi: x / Non Sq Epi: 5 /hpf / Bacteria: Negative          CAPILLARY BLOOD GLUCOSE          Drug Levels: [] N/A    CSF Analysis: [] N/A      Allergies    No Known Allergies    Intolerances      MEDICATIONS:  Antibiotics:    Neuro:  acetaminophen   Tablet .. 650 milliGRAM(s) Oral every 6 hours PRN  buPROPion SR (12-Hour) 400 milliGRAM(s) Oral daily  levETIRAcetam 500 milliGRAM(s) Oral every 12 hours  ondansetron Injectable 4 milliGRAM(s) IV Push every 6 hours PRN  oxyCODONE    IR 5 milliGRAM(s) Oral every 4 hours PRN  oxyCODONE    IR 10 milliGRAM(s) Oral every 4 hours PRN  venlafaxine XR. 150 milliGRAM(s) Oral daily    Anticoagulation:  enoxaparin Injectable 40 milliGRAM(s) SubCutaneous <User Schedule>    OTHER:  bisacodyl 5 milliGRAM(s) Oral every 12 hours  influenza   Vaccine 0.5 milliLiter(s) IntraMuscular once  pantoprazole    Tablet 40 milliGRAM(s) Oral before breakfast  polyethylene glycol 3350 17 Gram(s) Oral every 12 hours  senna 2 Tablet(s) Oral at bedtime  sodium chloride 0.65% Nasal 1 Spray(s) Both Nostrils three times a day    IVF:  multivitamin 1 Tablet(s) Oral daily  potassium chloride    Tablet ER 20 milliEquivalent(s) Oral every 2 hours  sodium chloride 0.9%. 1000 milliLiter(s) IV Continuous <Continuous>    CULTURES:    RADIOLOGY & ADDITIONAL TESTS:

## 2021-10-20 NOTE — PROGRESS NOTE ADULT - PROBLEM SELECTOR PLAN 2
- negative orthostatic hypotension  - continue RASHEEDA compression stockings, exercises before standing

## 2021-10-20 NOTE — DISCHARGE NOTE PROVIDER - CARE PROVIDERS DIRECT ADDRESSES
,[email or web address]

## 2021-10-20 NOTE — BH CONSULTATION LIAISON PROGRESS NOTE - NSBHFUPINTERVALHXFT_PSY_A_CORE
Pt seen for follow-up of depression. No acute events overnight. On interview, patient stated that she's worried about taking the recommended dosage of 400 mg of Wellbutrin because of her episode of orthostatic hypotension. She asked if she could stop taking the wellbutrin and only continue taking the effexor. Patient continues to endorse depressed mood and anxiety. Patient states that her sleep is also poor.

## 2021-10-21 LAB
ANION GAP SERPL CALC-SCNC: 13 MMOL/L — SIGNIFICANT CHANGE UP (ref 5–17)
BUN SERPL-MCNC: 18 MG/DL — SIGNIFICANT CHANGE UP (ref 7–23)
CALCIUM SERPL-MCNC: 9 MG/DL — SIGNIFICANT CHANGE UP (ref 8.4–10.5)
CHLORIDE SERPL-SCNC: 101 MMOL/L — SIGNIFICANT CHANGE UP (ref 96–108)
CO2 SERPL-SCNC: 21 MMOL/L — LOW (ref 22–31)
CREAT SERPL-MCNC: 1.21 MG/DL — SIGNIFICANT CHANGE UP (ref 0.5–1.3)
GLUCOSE SERPL-MCNC: 96 MG/DL — SIGNIFICANT CHANGE UP (ref 70–99)
HCT VFR BLD CALC: 38.2 % — SIGNIFICANT CHANGE UP (ref 34.5–45)
HGB BLD-MCNC: 12.5 G/DL — SIGNIFICANT CHANGE UP (ref 11.5–15.5)
MCHC RBC-ENTMCNC: 30.9 PG — SIGNIFICANT CHANGE UP (ref 27–34)
MCHC RBC-ENTMCNC: 32.7 GM/DL — SIGNIFICANT CHANGE UP (ref 32–36)
MCV RBC AUTO: 94.3 FL — SIGNIFICANT CHANGE UP (ref 80–100)
NRBC # BLD: 0 /100 WBCS — SIGNIFICANT CHANGE UP (ref 0–0)
PLATELET # BLD AUTO: 202 K/UL — SIGNIFICANT CHANGE UP (ref 150–400)
POTASSIUM SERPL-MCNC: 4.4 MMOL/L — SIGNIFICANT CHANGE UP (ref 3.5–5.3)
POTASSIUM SERPL-SCNC: 4.4 MMOL/L — SIGNIFICANT CHANGE UP (ref 3.5–5.3)
RBC # BLD: 4.05 M/UL — SIGNIFICANT CHANGE UP (ref 3.8–5.2)
RBC # FLD: 13.2 % — SIGNIFICANT CHANGE UP (ref 10.3–14.5)
SARS-COV-2 RNA SPEC QL NAA+PROBE: SIGNIFICANT CHANGE UP
SODIUM SERPL-SCNC: 135 MMOL/L — SIGNIFICANT CHANGE UP (ref 135–145)
WBC # BLD: 7.93 K/UL — SIGNIFICANT CHANGE UP (ref 3.8–10.5)
WBC # FLD AUTO: 7.93 K/UL — SIGNIFICANT CHANGE UP (ref 3.8–10.5)

## 2021-10-21 PROCEDURE — 99232 SBSQ HOSP IP/OBS MODERATE 35: CPT

## 2021-10-21 RX ADMIN — POLYETHYLENE GLYCOL 3350 17 GRAM(S): 17 POWDER, FOR SOLUTION ORAL at 17:28

## 2021-10-21 RX ADMIN — Medication 12.5 MILLIGRAM(S): at 12:22

## 2021-10-21 RX ADMIN — BUPROPION HYDROCHLORIDE 150 MILLIGRAM(S): 150 TABLET, EXTENDED RELEASE ORAL at 12:22

## 2021-10-21 RX ADMIN — Medication 150 MILLIGRAM(S): at 12:22

## 2021-10-21 RX ADMIN — Medication 5 MILLIGRAM(S): at 06:23

## 2021-10-21 RX ADMIN — Medication 1 SPRAY(S): at 06:21

## 2021-10-21 RX ADMIN — Medication 650 MILLIGRAM(S): at 21:50

## 2021-10-21 RX ADMIN — Medication 1 SPRAY(S): at 21:50

## 2021-10-21 RX ADMIN — Medication 12.5 MILLIGRAM(S): at 21:50

## 2021-10-21 RX ADMIN — POLYETHYLENE GLYCOL 3350 17 GRAM(S): 17 POWDER, FOR SOLUTION ORAL at 06:20

## 2021-10-21 RX ADMIN — Medication 650 MILLIGRAM(S): at 22:20

## 2021-10-21 RX ADMIN — LEVETIRACETAM 500 MILLIGRAM(S): 250 TABLET, FILM COATED ORAL at 17:28

## 2021-10-21 RX ADMIN — ONDANSETRON 4 MILLIGRAM(S): 8 TABLET, FILM COATED ORAL at 23:37

## 2021-10-21 RX ADMIN — Medication 650 MILLIGRAM(S): at 13:21

## 2021-10-21 RX ADMIN — Medication 5 MILLIGRAM(S): at 17:28

## 2021-10-21 RX ADMIN — LEVETIRACETAM 500 MILLIGRAM(S): 250 TABLET, FILM COATED ORAL at 06:21

## 2021-10-21 RX ADMIN — ENOXAPARIN SODIUM 40 MILLIGRAM(S): 100 INJECTION SUBCUTANEOUS at 17:29

## 2021-10-21 RX ADMIN — Medication 1 SPRAY(S): at 14:25

## 2021-10-21 RX ADMIN — Medication 650 MILLIGRAM(S): at 12:51

## 2021-10-21 RX ADMIN — PANTOPRAZOLE SODIUM 40 MILLIGRAM(S): 20 TABLET, DELAYED RELEASE ORAL at 06:21

## 2021-10-21 RX ADMIN — Medication 1 TABLET(S): at 12:22

## 2021-10-21 RX ADMIN — SENNA PLUS 2 TABLET(S): 8.6 TABLET ORAL at 02:06

## 2021-10-21 NOTE — PROGRESS NOTE ADULT - SUBJECTIVE AND OBJECTIVE BOX
SUBJECTIVE: Patient seen and examined,  Has mild headache controlled by medication     Vital Signs Last 24 Hrs  T(C): 36.4 (21 Oct 2021 05:51), Max: 36.9 (21 Oct 2021 00:33)  T(F): 97.6 (21 Oct 2021 05:51), Max: 98.5 (21 Oct 2021 00:33)  HR: 67 (21 Oct 2021 05:51) (64 - 74)  BP: 124/82 (21 Oct 2021 05:51) (116/60 - 141/79)  BP(mean): --  RR: 18 (21 Oct 2021 05:51) (18 - 18)  SpO2: 95% (21 Oct 2021 05:51) (95% - 99%)    PHYSICAL EXAM:    Constitutional: No Acute Distress     Neurological: AOx3, Following Commands, Moving all Extremities (right surgical pupil)    Motor exam:          Upper extremity                         Delt     Bicep     Tricep    HG                                                 R         5/5        5/5        5/5       5/5                                               L          5/5        5/5        5/5       5/5          Lower extremity                        HF         KF        KE       DF         PF                                                  R        5/5        5/5        5/5       5/5         5/5                                               L         5/5        5/5       5/5       5/5          5/5                                                 Sensation: [x] intact to light touch  [] decreased:     Pulmonary: Clear to Auscultation, No rales, No rhonchi, No wheezes     Cardiovascular: S1, S2, Regular rate and rhythm     Gastrointestinal: Soft, Non-tender, Non-distended     Extremities: No calf tenderness     LABS:                        12.5   7.93  )-----------( 202      ( 21 Oct 2021 06:32 )             38.2    10-21    135  |  101  |  18  ----------------------------<  96  4.4   |  21<L>  |  1.21    Ca    9.0      21 Oct 2021 06:24        10-20 @ 07:01  -  10-21 @ 07:00  --------------------------------------------------------  IN: 920 mL / OUT: 0 mL / NET: 920 mL      DRAINS:     MEDICATIONS:  Anticoagulation:   enoxaparin Injectable 40 milliGRAM(s) SubCutaneous <User Schedule>    Antibiotics:    Endo:    Neuro:  acetaminophen   Tablet .. 650 milliGRAM(s) Oral every 6 hours PRN Temp greater or equal to 38C (100.4F), Mild Pain (1 - 3)  buPROPion XL (24-Hour) . 150 milliGRAM(s) Oral daily  levETIRAcetam 500 milliGRAM(s) Oral every 12 hours  meclizine 12.5 milliGRAM(s) Oral three times a day PRN Dizziness  melatonin 3 milliGRAM(s) Oral at bedtime PRN Insomnia  ondansetron Injectable 4 milliGRAM(s) IV Push every 6 hours PRN Nausea and/or Vomiting  oxyCODONE    IR 5 milliGRAM(s) Oral every 4 hours PRN Moderate Pain (4 - 6)  oxyCODONE    IR 10 milliGRAM(s) Oral every 4 hours PRN Severe Pain (7 - 10)  venlafaxine XR. 150 milliGRAM(s) Oral daily    Cardiac:    Pulm:    GI/:  bisacodyl 5 milliGRAM(s) Oral every 12 hours  pantoprazole    Tablet 40 milliGRAM(s) Oral before breakfast  polyethylene glycol 3350 17 Gram(s) Oral every 12 hours  senna 2 Tablet(s) Oral at bedtime    Other:   influenza   Vaccine 0.5 milliLiter(s) IntraMuscular once  multivitamin 1 Tablet(s) Oral daily  sodium chloride 0.65% Nasal 1 Spray(s) Both Nostrils three times a day    DIET: regular     IMAGING:

## 2021-10-21 NOTE — PROGRESS NOTE ADULT - PROBLEM SELECTOR PLAN 6
- s/p supplements this am, will continue to monitor
- s/p supplements this am, will continue to monitor

## 2021-10-21 NOTE — PROGRESS NOTE ADULT - SUBJECTIVE AND OBJECTIVE BOX
Admitting Diagnosis:  Traumatic subarachnoid hemorrhage with loss of consciousness of unspecified duration, initial encounter [S06.6X9A]  TRAUM SUBRAC HEM W LOC OF UNSP DURATION, INIT        Background:  66 year old woman s/p mechanical fall down the stairs, previously on aspirin, CTH w/ trace R frontal tSAH and L sub occip skull fx, repeat CTH w/ trace inc R frontal tsah, thin bifrontal sdh, and new thin temporal tsah. CTV hypoplastic L TS but open.  No other injuries.  Neurologically intact.  For the past few weeks she has been complaining of vertigo when she stands up.  She never gets this woods she is sitting, and after standing for a while it goes away.  Per report here has mild orthostatic hypotension with diastolic BP.  She also was concerned about possible multiple sclerosis based on family history.    Interval Hx:  was doing well though today is vertiginous even while sitting    ******    Past Medical History:  HLD (hyperlipidemia) [E78.5]    H/O ureterostomy [Z98.890]        Past Surgical History:  No significant past surgical history [[id number]]    S/p nephrectomy [Z90.5]        Social History:  No toxic habits    Family History:  FAMILY HISTORY:      Allergies:  No Known Allergies      ROS:  Constitutional: Patient offers no complaints of fevers or significant weight loss  Ears, Nose, Mouth and Throat: The patient presents with no abnormalities of the head, ears, eyes, nose or throat  Skin: Patient offers no concerns of new rashes or lesions  Respiratory: The patient presents with no abnormalities of the respiratory tract  Cardiovascular: The patient presents with no cardiac abnormalities  Gastrointestinal: The patient presents with no abnormalities of the GI system  Genitourinary: The patient presents with no dysuria, hematuria or frequent urination  Neurological: See HPI  Endocrine: Patient offers no complaints of excessive thirst, urination, or heat/cold intolerance    Advanced care planning reviewed and noted in the chart.    Medications:  acetaminophen   Tablet .. 650 milliGRAM(s) Oral every 6 hours PRN  bisacodyl 5 milliGRAM(s) Oral every 12 hours  buPROPion XL (24-Hour) . 150 milliGRAM(s) Oral daily  enoxaparin Injectable 40 milliGRAM(s) SubCutaneous <User Schedule>  influenza   Vaccine 0.5 milliLiter(s) IntraMuscular once  levETIRAcetam 500 milliGRAM(s) Oral every 12 hours  meclizine 12.5 milliGRAM(s) Oral three times a day PRN  melatonin 3 milliGRAM(s) Oral at bedtime PRN  multivitamin 1 Tablet(s) Oral daily  ondansetron Injectable 4 milliGRAM(s) IV Push every 6 hours PRN  oxyCODONE    IR 5 milliGRAM(s) Oral every 4 hours PRN  oxyCODONE    IR 10 milliGRAM(s) Oral every 4 hours PRN  pantoprazole    Tablet 40 milliGRAM(s) Oral before breakfast  polyethylene glycol 3350 17 Gram(s) Oral every 12 hours  senna 2 Tablet(s) Oral at bedtime  sodium chloride 0.65% Nasal 1 Spray(s) Both Nostrils three times a day  venlafaxine XR. 150 milliGRAM(s) Oral daily      Labs:  CBC Full  -  ( 21 Oct 2021 06:32 )  WBC Count : 7.93 K/uL  RBC Count : 4.05 M/uL  Hemoglobin : 12.5 g/dL  Hematocrit : 38.2 %  Platelet Count - Automated : 202 K/uL  Mean Cell Volume : 94.3 fl  Mean Cell Hemoglobin : 30.9 pg  Mean Cell Hemoglobin Concentration : 32.7 gm/dL  Auto Neutrophil # : x  Auto Lymphocyte # : x  Auto Monocyte # : x  Auto Eosinophil # : x  Auto Basophil # : x  Auto Neutrophil % : x  Auto Lymphocyte % : x  Auto Monocyte % : x  Auto Eosinophil % : x  Auto Basophil % : x    10-21    135  |  101  |  18  ----------------------------<  96  4.4   |  21<L>  |  1.21    Ca    9.0      21 Oct 2021 06:24      CAPILLARY BLOOD GLUCOSE            Urinalysis Basic - ( 19 Oct 2021 13:25 )    Color: Yellow / Appearance: Clear / S.032 / pH: x  Gluc: x / Ketone: Negative  / Bili: Negative / Urobili: Negative   Blood: x / Protein: Trace / Nitrite: Negative   Leuk Esterase: Negative / RBC: 3 /hpf / WBC 2 /HPF   Sq Epi: x / Non Sq Epi: 5 /hpf / Bacteria: Negative          Vitals:  Vital Signs Last 24 Hrs  T(C): 36.4 (21 Oct 2021 09:58), Max: 36.9 (21 Oct 2021 00:33)  T(F): 97.5 (21 Oct 2021 09:58), Max: 98.5 (21 Oct 2021 00:33)  HR: 68 (21 Oct 2021 09:58) (64 - 74)  BP: 120/78 (21 Oct 2021 09:58) (116/60 - 141/79)  BP(mean): --  RR: 18 (21 Oct 2021 09:58) (18 - 18)  SpO2: 100% (21 Oct 2021 09:58) (95% - 100%)    NEUROLOGICAL EXAM:    Mental status: Awake, alert, and in no apparent distress. Oriented to person, place and time. Language function is normal. Recent memory, digit span and concentration were normal.     Cranial Nerves: Pupils were equal, round, reactive to light. Extraocular movements were intact. Visual field were full. Fundoscopic exam was deferred. Facial sensation was intact to light touch. There was no facial asymmetry. The palate was upgoing symmetrically and tongue was midline. Hearing acuity was intact to finger rub AU. Shoulder shrug was full bilaterally    Motor exam: Bulk and tone were normal. Strength was 5/5 in all four extremities. Fine finger movements were symmetric and normal. There was no pronator drift    Reflexes: 2+ in the bilateral upper extremities. 2+ in the bilateral lower extremities. Toes were downgoing bilaterally.     Sensation: Intact to light touch, temperature, vibration and proprioception.     Coordination: Finger-nose-finger and heel-to-shin was without dysmetria.     Gait: declined to ambulate given resent fall and ICH

## 2021-10-21 NOTE — PROGRESS NOTE ADULT - PROBLEM SELECTOR PLAN 7
- incentive spirometer  - dulcolax added for bowel regimens  - DVT ppx: b/l SCDs and SQL    will discuss above with Dr. Misti mandel [id number]
- incentive spirometer  - dulcolax added for bowel regimens  - DVT ppx: b/l SCDs and SQL    will discuss above with Dr. Misti mandel [id number]

## 2021-10-21 NOTE — PROGRESS NOTE ADULT - ATTENDING COMMENTS
Pt seen and examined evening 10/19/21.  Pt is alert, awake in chair with brother at bedside.  She is LEONARD with no drift.  Pt states she has vertigo which had been a pre-injury condition, but she does not think that it contributed to her fall, but she has been experiencing a sense of imbalance which may have contributed.  She does not have headache at the time of visit.  Discussed that pt has some concussion symptoms as a result of the head injury which also includes intracranial hemorrhage and skull fracture.  Pt is concerned about neurological issues and we will obtain neurology consultation.  In addition, she and brother are concerned about changes in CT and would like another scan to assess.  Pt has been deemed an acute rehab candidate and we discussed the process and advantage of rehab.
Pt seen and examined 10/18/21.  Agree with above  evaluation and assessment.  Pt seen and in bed, preferring not to open eyes, except briefly.  LEONARD on command.  CT with now stable bilateral thin SDH/TSAH, no mass effect.  Continue monitoring.
Pt seen 10/20/21.  PT feeling better.  She is on meclizine.  HORACIO.  Pending rehab transfer potentially today.
Pt seen 10/20/21.  PT feeling better.  She is on meclizine.  HORACIO.  Pending rehab transfer.  Neurology consult appreciated.

## 2021-10-21 NOTE — PROGRESS NOTE ADULT - ASSESSMENT
Impression:  66 year old woman s/p mechanical fall down the stairs, previously on aspirin, CTH w/ trace R frontal tSAH and L sub occip skull fx, repeat CTH w/ trace inc R frontal tsah, thin bifrontal sdh, and new thin temporal tsah. CTV hypoplastic L TS but open.  No other injuries.  Neurologically intact.  For the past few weeks she has been complaining of vertigo when she stands up.  She never gets this woods she is sitting, and after standing for a while it goes away.  Per report here has mild orthostatic hypotension with diastolic BP.  She also was concerned about possible multiple sclerosis based on family history.    Diagnosis:  traumatic SDH    Recommendations:  tSAH and tSDH per nsx  repeat orthostatics if able for confirmation  advised hydration, compression stockings, and care to avoid falls in future.  Can increase salt intake if ok with internist but defer on this given she only has one kidney.    if this does not help can consider pharmaceutical intervention for orthostatic hypotension but will hold off on this for now.   she does not have Parkinsonism   she does not have any signs or symptoms of multiple sclerosis  vertigo: recommend meclizine for symptomatic relief   provided reassurance   dispo to rehab when medically and neurosurgically ready  follow up in office as outpatient

## 2021-10-21 NOTE — PROGRESS NOTE ADULT - ASSESSMENT
HPI:  Patient is a 66 year old female that had mechanical fall down stairs and presented with a headache.  Was on aspirin.  CT head done which showed sub occipital skull fracture and tSAH.  Admitted the neurosurgery service for further management.  HPI:  66F s/p mech fall down stairs, last ASA 3d ago, , pw HA/nausea (concussion sxs), labs wnl, CTH w/ trace R frontal tSAH and L sub occip skull fx, repeat CTH w/ trace inc R frontal tsah, thin bifrontal sdh, and new thin temporal tsah. CTV hypoplastic L TS but open. No other inj. Exam: EOV otherwise neuro intact. GCS 14. (17 Oct 2021 23:18)    PAST MEDICAL & SURGICAL HISTORY:  HLD (hyperlipidemia)    H/O ureterostomy    S/p nephrectomy          PLAN:  Neuro:   - traumatic SAH/SDH from mechanical fall  - CT head this am showed similar-appearing bifrontal predominantly hypodense subdural hematomas. Evaluation of the bilateral anterior frontal lobes is limited due to artifact. Within this limitation, there is suggestion of foci of hyperdensity in the bilateral anterior inferior frontal lobes with surrounding hypodensity, raising suspicion for hemorrhagic contusions.  - continue neuro check Q4hrs  - continue keppra for seizure prophylaxis  - pain control with tylenol and oxycodone prn  - activity increase as tolerated  - PT/OT- acute rehab  Respiratory: incentive spirometry  CV: keep sbp 100-150  - negative orthostatic hypotension  - continue RASHEEDA compression stockings, exercises before standing  - ent saw for dizziness recommend meclizine   Endocrine: stable    Heme/Onc:      stable        DVT ppx: lovenox, scds  Renal: ivl, voiding   ID: afebrile   GI:  tolerating  diet   PT/OT: acute tbi  Will discuss with  Dr. Misti Salinas # [id number]

## 2021-10-22 PROCEDURE — 99232 SBSQ HOSP IP/OBS MODERATE 35: CPT

## 2021-10-22 RX ADMIN — Medication 1 TABLET(S): at 12:18

## 2021-10-22 RX ADMIN — ENOXAPARIN SODIUM 40 MILLIGRAM(S): 100 INJECTION SUBCUTANEOUS at 17:34

## 2021-10-22 RX ADMIN — PANTOPRAZOLE SODIUM 40 MILLIGRAM(S): 20 TABLET, DELAYED RELEASE ORAL at 05:53

## 2021-10-22 RX ADMIN — Medication 650 MILLIGRAM(S): at 09:15

## 2021-10-22 RX ADMIN — Medication 1 SPRAY(S): at 21:37

## 2021-10-22 RX ADMIN — LEVETIRACETAM 500 MILLIGRAM(S): 250 TABLET, FILM COATED ORAL at 05:53

## 2021-10-22 RX ADMIN — Medication 12.5 MILLIGRAM(S): at 09:15

## 2021-10-22 RX ADMIN — Medication 1 SPRAY(S): at 13:42

## 2021-10-22 RX ADMIN — POLYETHYLENE GLYCOL 3350 17 GRAM(S): 17 POWDER, FOR SOLUTION ORAL at 17:33

## 2021-10-22 RX ADMIN — Medication 5 MILLIGRAM(S): at 05:53

## 2021-10-22 RX ADMIN — Medication 650 MILLIGRAM(S): at 10:00

## 2021-10-22 RX ADMIN — Medication 150 MILLIGRAM(S): at 12:19

## 2021-10-22 RX ADMIN — Medication 12.5 MILLIGRAM(S): at 21:37

## 2021-10-22 RX ADMIN — LEVETIRACETAM 500 MILLIGRAM(S): 250 TABLET, FILM COATED ORAL at 17:33

## 2021-10-22 RX ADMIN — BUPROPION HYDROCHLORIDE 150 MILLIGRAM(S): 150 TABLET, EXTENDED RELEASE ORAL at 12:18

## 2021-10-22 RX ADMIN — Medication 12.5 MILLIGRAM(S): at 16:43

## 2021-10-22 RX ADMIN — Medication 5 MILLIGRAM(S): at 17:34

## 2021-10-22 NOTE — PROGRESS NOTE ADULT - PROBLEM SELECTOR PLAN 5
- s/p supplements this am, will continue to monitor
- incentive spirometer  - last BM yesterday, continue current bowel regimens  - DVT ppx: b/l SCDs and SQL    will discuss above with Dr. Misti mandel [id number]
- s/p supplements this am, will continue to monitor

## 2021-10-22 NOTE — PROGRESS NOTE ADULT - SUBJECTIVE AND OBJECTIVE BOX
Patient was seen at bedside this am. Patient still has dizziness/vertigo but slightly better. Denied any headache, cp, sob, n/v, or abd pain.    OVERNIGHT EVENTS: No acute event overnight    Vital Signs Last 24 Hrs  T(C): 36.6 (22 Oct 2021 06:55), Max: 36.8 (21 Oct 2021 23:25)  T(F): 97.9 (22 Oct 2021 06:55), Max: 98.2 (21 Oct 2021 23:25)  HR: 66 (22 Oct 2021 06:55) (60 - 69)  BP: 122/83 (22 Oct 2021 06:55) (120/78 - 138/86)  BP(mean): --  RR: 18 (22 Oct 2021 06:55) (18 - 19)  SpO2: 99% (22 Oct 2021 06:55) (95% - 100%)    I&O's Detail    21 Oct 2021 07:01  -  22 Oct 2021 07:00  --------------------------------------------------------  IN:    Oral Fluid: 700 mL  Total IN: 700 mL    OUT:  Total OUT: 0 mL    Total NET: 700 mL        I&O's Summary    21 Oct 2021 07:01  -  22 Oct 2021 07:00  --------------------------------------------------------  IN: 700 mL / OUT: 0 mL / NET: 700 mL        PHYSICAL EXAM:  Neurological:  awake, alert, oriented to person, place and date, PERRL, face symmetrical, speech clear and fluent, following commands, moving all extremities 5/5, no drift, sensation intact to light touch  Cardiovascular: +s1, s2  Respiratory: clear to auscultation b/l  Gastrointestinal: soft, non-distended, non-tender  Genitourinary: voiding  Extremities: warm, dry        LABS:                        12.5   7.93  )-----------( 202      ( 21 Oct 2021 06:32 )             38.2     10-21    135  |  101  |  18  ----------------------------<  96  4.4   |  21<L>  |  1.21    Ca    9.0      21 Oct 2021 06:24              CAPILLARY BLOOD GLUCOSE          Drug Levels: [] N/A    CSF Analysis: [] N/A      Allergies    No Known Allergies    Intolerances      MEDICATIONS:  Antibiotics:    Neuro:  acetaminophen   Tablet .. 650 milliGRAM(s) Oral every 6 hours PRN  buPROPion XL (24-Hour) . 150 milliGRAM(s) Oral daily  levETIRAcetam 500 milliGRAM(s) Oral every 12 hours  meclizine 12.5 milliGRAM(s) Oral three times a day PRN  melatonin 3 milliGRAM(s) Oral at bedtime PRN  ondansetron Injectable 4 milliGRAM(s) IV Push every 6 hours PRN  oxyCODONE    IR 5 milliGRAM(s) Oral every 4 hours PRN  oxyCODONE    IR 10 milliGRAM(s) Oral every 4 hours PRN  venlafaxine XR. 150 milliGRAM(s) Oral daily    Anticoagulation:  enoxaparin Injectable 40 milliGRAM(s) SubCutaneous <User Schedule>    OTHER:  bisacodyl 5 milliGRAM(s) Oral every 12 hours  influenza   Vaccine 0.5 milliLiter(s) IntraMuscular once  pantoprazole    Tablet 40 milliGRAM(s) Oral before breakfast  polyethylene glycol 3350 17 Gram(s) Oral every 12 hours  senna 2 Tablet(s) Oral at bedtime  sodium chloride 0.65% Nasal 1 Spray(s) Both Nostrils three times a day    IVF:  multivitamin 1 Tablet(s) Oral daily    CULTURES:    RADIOLOGY & ADDITIONAL TESTS:

## 2021-10-22 NOTE — PROGRESS NOTE ADULT - SUBJECTIVE AND OBJECTIVE BOX
Admitting Diagnosis:  Traumatic subarachnoid hemorrhage with loss of consciousness of unspecified duration, initial encounter [S06.6X9A]  TRAUM SUBRAC HEM W LOC OF UNSP DURATION, INIT        Background:  66 year old woman s/p mechanical fall down the stairs, previously on aspirin, CTH w/ trace R frontal tSAH and L sub occip skull fx, repeat CTH w/ trace inc R frontal tsah, thin bifrontal sdh, and new thin temporal tsah. CTV hypoplastic L TS but open.  No other injuries.  Neurologically intact.  For the past few weeks she has been complaining of vertigo when she stands up.  She never gets this woods she is sitting, and after standing for a while it goes away.  Per report here has mild orthostatic hypotension with diastolic BP.  She also was concerned about possible multiple sclerosis based on family history.    Interval Hx:  still with vertigo but improved with meclizine     ******    Past Medical History:  HLD (hyperlipidemia) [E78.5]    H/O ureterostomy [Z98.890]        Past Surgical History:  No significant past surgical history [[id number]]    S/p nephrectomy [Z90.5]        Social History:  No toxic habits    Family History:  FAMILY HISTORY:      Allergies:  No Known Allergies      ROS:  Constitutional: Patient offers no complaints of fevers or significant weight loss  Ears, Nose, Mouth and Throat: The patient presents with no abnormalities of the head, ears, eyes, nose or throat  Skin: Patient offers no concerns of new rashes or lesions  Respiratory: The patient presents with no abnormalities of the respiratory tract  Cardiovascular: The patient presents with no cardiac abnormalities  Gastrointestinal: The patient presents with no abnormalities of the GI system  Genitourinary: The patient presents with no dysuria, hematuria or frequent urination  Neurological: See HPI  Endocrine: Patient offers no complaints of excessive thirst, urination, or heat/cold intolerance    Advanced care planning reviewed and noted in the chart.    Medications:  acetaminophen   Tablet .. 650 milliGRAM(s) Oral every 6 hours PRN  bisacodyl 5 milliGRAM(s) Oral every 12 hours  buPROPion XL (24-Hour) . 150 milliGRAM(s) Oral daily  enoxaparin Injectable 40 milliGRAM(s) SubCutaneous <User Schedule>  influenza   Vaccine 0.5 milliLiter(s) IntraMuscular once  levETIRAcetam 500 milliGRAM(s) Oral every 12 hours  meclizine 12.5 milliGRAM(s) Oral three times a day PRN  melatonin 3 milliGRAM(s) Oral at bedtime PRN  multivitamin 1 Tablet(s) Oral daily  ondansetron Injectable 4 milliGRAM(s) IV Push every 6 hours PRN  oxyCODONE    IR 5 milliGRAM(s) Oral every 4 hours PRN  oxyCODONE    IR 10 milliGRAM(s) Oral every 4 hours PRN  pantoprazole    Tablet 40 milliGRAM(s) Oral before breakfast  polyethylene glycol 3350 17 Gram(s) Oral every 12 hours  senna 2 Tablet(s) Oral at bedtime  sodium chloride 0.65% Nasal 1 Spray(s) Both Nostrils three times a day  venlafaxine XR. 150 milliGRAM(s) Oral daily      Labs:  CBC Full  -  ( 21 Oct 2021 06:32 )  WBC Count : 7.93 K/uL  RBC Count : 4.05 M/uL  Hemoglobin : 12.5 g/dL  Hematocrit : 38.2 %  Platelet Count - Automated : 202 K/uL  Mean Cell Volume : 94.3 fl  Mean Cell Hemoglobin : 30.9 pg  Mean Cell Hemoglobin Concentration : 32.7 gm/dL  Auto Neutrophil # : x  Auto Lymphocyte # : x  Auto Monocyte # : x  Auto Eosinophil # : x  Auto Basophil # : x  Auto Neutrophil % : x  Auto Lymphocyte % : x  Auto Monocyte % : x  Auto Eosinophil % : x  Auto Basophil % : x    10-21    135  |  101  |  18  ----------------------------<  96  4.4   |  21<L>  |  1.21    Ca    9.0      21 Oct 2021 06:24      CAPILLARY BLOOD GLUCOSE                  Vitals:  Vital Signs Last 24 Hrs  T(C): 36.6 (22 Oct 2021 06:55), Max: 36.8 (21 Oct 2021 23:25)  T(F): 97.9 (22 Oct 2021 06:55), Max: 98.2 (21 Oct 2021 23:25)  HR: 66 (22 Oct 2021 06:55) (60 - 69)  BP: 122/83 (22 Oct 2021 06:55) (122/83 - 138/86)  BP(mean): --  RR: 18 (22 Oct 2021 06:55) (18 - 19)  SpO2: 99% (22 Oct 2021 06:55) (95% - 100%)    NEUROLOGICAL EXAM:    Mental status: Awake, alert, and in no apparent distress. Oriented to person, place and time. Language function is normal. Recent memory, digit span and concentration were normal.     Cranial Nerves: Pupils were equal, round, reactive to light. Extraocular movements were intact. Visual field were full. Fundoscopic exam was deferred. Facial sensation was intact to light touch. There was no facial asymmetry. The palate was upgoing symmetrically and tongue was midline. Hearing acuity was intact to finger rub AU. Shoulder shrug was full bilaterally    Motor exam: Bulk and tone were normal. Strength was 5/5 in all four extremities. Fine finger movements were symmetric and normal. There was no pronator drift    Reflexes: 2+ in the bilateral upper extremities. 2+ in the bilateral lower extremities. Toes were downgoing bilaterally.     Sensation: Intact to light touch, temperature, vibration and proprioception.     Coordination: Finger-nose-finger and heel-to-shin was without dysmetria.     Gait: declined to ambulate given resent fall and ICH

## 2021-10-22 NOTE — PROGRESS NOTE ADULT - PROBLEM SELECTOR PROBLEM 1
SAH (subarachnoid hemorrhage)

## 2021-10-22 NOTE — PROGRESS NOTE ADULT - PROBLEM SELECTOR PLAN 4
- neurology consult appreciated today for vertigo and possible workup for MS, will f/u recommendations
- neurology and ENT consult appreciated, continue meclizine for dizziness, need vestibular rehab  - outpatient MS workup if needed per neurology
- neurology consult appreciated today for vertigo and possible workup for MS, will f/u recommendations

## 2021-10-22 NOTE — PROGRESS NOTE ADULT - SUBJECTIVE AND OBJECTIVE BOX
Patient resting in bed and comfortable.  Denies HA  No CP, no SOB  Tolerating therapies- CG transfers and gait.    MEDICATIONS  (STANDING):  bisacodyl 5 milliGRAM(s) Oral every 12 hours  buPROPion XL (24-Hour) . 150 milliGRAM(s) Oral daily  enoxaparin Injectable 40 milliGRAM(s) SubCutaneous <User Schedule>  influenza   Vaccine 0.5 milliLiter(s) IntraMuscular once  levETIRAcetam 500 milliGRAM(s) Oral every 12 hours  multivitamin 1 Tablet(s) Oral daily  pantoprazole    Tablet 40 milliGRAM(s) Oral before breakfast  polyethylene glycol 3350 17 Gram(s) Oral every 12 hours  senna 2 Tablet(s) Oral at bedtime  sodium chloride 0.65% Nasal 1 Spray(s) Both Nostrils three times a day  venlafaxine XR. 150 milliGRAM(s) Oral daily    MEDICATIONS  (PRN):  acetaminophen   Tablet .. 650 milliGRAM(s) Oral every 6 hours PRN Temp greater or equal to 38C (100.4F), Mild Pain (1 - 3)  meclizine 12.5 milliGRAM(s) Oral three times a day PRN Dizziness  melatonin 3 milliGRAM(s) Oral at bedtime PRN Insomnia  ondansetron Injectable 4 milliGRAM(s) IV Push every 6 hours PRN Nausea and/or Vomiting  oxyCODONE    IR 5 milliGRAM(s) Oral every 4 hours PRN Moderate Pain (4 - 6)  oxyCODONE    IR 10 milliGRAM(s) Oral every 4 hours PRN Severe Pain (7 - 10)    Vital Signs Last 24 Hrs  T(C): 36.4 (22 Oct 2021 11:54), Max: 36.8 (21 Oct 2021 23:25)  T(F): 97.5 (22 Oct 2021 11:54), Max: 98.2 (21 Oct 2021 23:25)  HR: 66 (22 Oct 2021 06:55) (60 - 69)  BP: 122/83 (22 Oct 2021 06:55) (122/83 - 138/85)  BP(mean): --  RR: 18 (22 Oct 2021 06:55) (18 - 19)  SpO2: 99% (22 Oct 2021 06:55) (95% - 100%)    PHYSICAL EXAM  Constitutional - NAD, Comfortable  HEENT - NCAT, EOMI  Extremities - No C/C/E, No calf tenderness   Neurologic Exam -                  AAO x 3  Motor non-focal  No clonus  Psychiatric - Mood stable, Affect WNL                          12.5   7.93  )-----------( 202      ( 21 Oct 2021 06:32 )             38.2       10-21    135  |  101  |  18  ----------------------------<  96  4.4   |  21<L>  |  1.21    Ca    9.0      21 Oct 2021 06:24        Impression:  67 yo with functional deficits secondary to diagnosis of TBIm SAH, orthostatic hypotension    Plan:  PT- ROM, Bed Mob, Transfers, Amb w AD  OT- ADLs  SLP- Dysphagia eval and treat  Prec- Falls, Cardiac  DVT Prophylaxis- SCDs, Lovenox  Skin- Turn q2 h  Dispo- Acute Rehab- can tolerate 3h/d of therapies and requires daily physician visits

## 2021-10-22 NOTE — PROGRESS NOTE ADULT - PROBLEM SELECTOR PLAN 3
paulie forman to see if Welbutrin or Effexor should be tapered down
- psych consult appreciated, wellbutrin decreased to 150mg daily, continue effexor 150mg daily
- psych consult appreciated yesterday, recommend continuing home medications at current doses (Wellbutrin 400 mg and Effexor 75 mg bid). Wellbutrin and Effexor unlikely to be causing orthostatic hypotension but can cause dizziness
- psych consult appreciated yesterday, recommend continuing home medications at current doses (Wellbutrin 400 mg and Effexor 75 mg bid). Wellbutrin and Effexor unlikely to be causing orthostatic hypotension but can cause dizziness

## 2021-10-22 NOTE — PROGRESS NOTE ADULT - PROBLEM SELECTOR PLAN 2
- improved  - encourage oral hydration, continue RASHEEDA compression stockings, exercises before standing

## 2021-10-22 NOTE — PROGRESS NOTE ADULT - PROVIDER SPECIALTY LIST ADULT
Hospitalist
Neurology
Neurosurgery
Rehab Medicine
Neurology
Neurosurgery

## 2021-10-22 NOTE — PROGRESS NOTE ADULT - PROBLEM SELECTOR PLAN 1
- traumatic SAH/SDH from mechanical fall  - CT head this am showed similar-appearing bifrontal predominantly hypodense subdural hematomas. Evaluation of the bilateral anterior frontal lobes is limited due to artifact. Within this limitation, there is suggestion of foci of hyperdensity in the bilateral anterior inferior frontal lobes with surrounding hypodensity, raising suspicion for hemorrhagic contusions.  - continue neuro check Q4hrs  - continue keppra for seizure prophylaxis  - pain control with tylenol and oxycodone prn  - activity increase as tolerated  - PT/OT- acute rehab
- traumatic SAH/SDH from mechanical fall  - CT head on 10/20 showed similar-appearing bifrontal predominantly hypodense subdural hematomas. Evaluation of the bilateral anterior frontal lobes is limited due to artifact. Within this limitation, there is suggestion of foci of hyperdensity in the bilateral anterior inferior frontal lobes with surrounding hypodensity, raising suspicion for hemorrhagic contusions.  - continue neuro check Q4hrs  - continue keppra for seizure prophylaxis  - pain control with tylenol and oxycodone prn  - activity increase as tolerated  - PT/OT/PMR- acute rehab
monitor per neurosurg
- traumatic SAH/SDH from mechanical fall  - CT head this am showed similar-appearing bifrontal predominantly hypodense subdural hematomas. Evaluation of the bilateral anterior frontal lobes is limited due to artifact. Within this limitation, there is suggestion of foci of hyperdensity in the bilateral anterior inferior frontal lobes with surrounding hypodensity, raising suspicion for hemorrhagic contusions.  - continue neuro check Q4hrs  - continue keppra for seizure prophylaxis  - pain control with tylenol and oxycodone prn  - activity increase as tolerated  - PT/OT- acute rehab

## 2021-10-23 ENCOUNTER — TRANSCRIPTION ENCOUNTER (OUTPATIENT)
Age: 66
End: 2021-10-23

## 2021-10-23 ENCOUNTER — INPATIENT (INPATIENT)
Facility: HOSPITAL | Age: 66
LOS: 6 days | Discharge: HOME | DRG: 949 | End: 2021-10-30
Attending: SPECIALIST | Admitting: SPECIALIST
Payer: COMMERCIAL

## 2021-10-23 VITALS
SYSTOLIC BLOOD PRESSURE: 119 MMHG | HEART RATE: 66 BPM | RESPIRATION RATE: 18 BRPM | TEMPERATURE: 98 F | OXYGEN SATURATION: 99 % | DIASTOLIC BLOOD PRESSURE: 81 MMHG

## 2021-10-23 VITALS
RESPIRATION RATE: 16 BRPM | WEIGHT: 213.85 LBS | DIASTOLIC BLOOD PRESSURE: 76 MMHG | SYSTOLIC BLOOD PRESSURE: 115 MMHG | HEIGHT: 68 IN | HEART RATE: 65 BPM | OXYGEN SATURATION: 98 % | TEMPERATURE: 98 F

## 2021-10-23 DIAGNOSIS — Z90.5 ACQUIRED ABSENCE OF KIDNEY: Chronic | ICD-10-CM

## 2021-10-23 DIAGNOSIS — Z98.890 OTHER SPECIFIED POSTPROCEDURAL STATES: Chronic | ICD-10-CM

## 2021-10-23 DIAGNOSIS — I60.9 NONTRAUMATIC SUBARACHNOID HEMORRHAGE, UNSPECIFIED: ICD-10-CM

## 2021-10-23 RX ORDER — OXYCODONE HYDROCHLORIDE 5 MG/1
10 TABLET ORAL EVERY 4 HOURS
Refills: 0 | Status: DISCONTINUED | OUTPATIENT
Start: 2021-10-23 | End: 2021-10-28

## 2021-10-23 RX ORDER — SODIUM CHLORIDE 0.65 %
1 AEROSOL, SPRAY (ML) NASAL THREE TIMES A DAY
Refills: 0 | Status: DISCONTINUED | OUTPATIENT
Start: 2021-10-24 | End: 2021-10-30

## 2021-10-23 RX ORDER — VENLAFAXINE HCL 75 MG
150 CAPSULE, EXT RELEASE 24 HR ORAL DAILY
Refills: 0 | Status: DISCONTINUED | OUTPATIENT
Start: 2021-10-24 | End: 2021-10-30

## 2021-10-23 RX ORDER — PANTOPRAZOLE SODIUM 20 MG/1
40 TABLET, DELAYED RELEASE ORAL
Refills: 0 | Status: DISCONTINUED | OUTPATIENT
Start: 2021-10-23 | End: 2021-10-30

## 2021-10-23 RX ORDER — BUPROPION HYDROCHLORIDE 150 MG/1
150 TABLET, EXTENDED RELEASE ORAL DAILY
Refills: 0 | Status: DISCONTINUED | OUTPATIENT
Start: 2021-10-24 | End: 2021-10-30

## 2021-10-23 RX ORDER — ONDANSETRON 8 MG/1
4 TABLET, FILM COATED ORAL EVERY 6 HOURS
Refills: 0 | Status: DISCONTINUED | OUTPATIENT
Start: 2021-10-23 | End: 2021-10-28

## 2021-10-23 RX ORDER — OXYCODONE HYDROCHLORIDE 5 MG/1
5 TABLET ORAL EVERY 4 HOURS
Refills: 0 | Status: DISCONTINUED | OUTPATIENT
Start: 2021-10-23 | End: 2021-10-28

## 2021-10-23 RX ORDER — ACETAMINOPHEN 500 MG
2 TABLET ORAL
Qty: 0 | Refills: 0 | DISCHARGE
Start: 2021-10-23

## 2021-10-23 RX ORDER — ENOXAPARIN SODIUM 100 MG/ML
40 INJECTION SUBCUTANEOUS
Refills: 0 | Status: DISCONTINUED | OUTPATIENT
Start: 2021-10-24 | End: 2021-10-30

## 2021-10-23 RX ORDER — LANOLIN ALCOHOL/MO/W.PET/CERES
3 CREAM (GRAM) TOPICAL AT BEDTIME
Refills: 0 | Status: DISCONTINUED | OUTPATIENT
Start: 2021-10-24 | End: 2021-10-30

## 2021-10-23 RX ORDER — MECLIZINE HCL 12.5 MG
1 TABLET ORAL
Qty: 0 | Refills: 0 | DISCHARGE
Start: 2021-10-23

## 2021-10-23 RX ORDER — POLYETHYLENE GLYCOL 3350 17 G/17G
17 POWDER, FOR SOLUTION ORAL EVERY 12 HOURS
Refills: 0 | Status: DISCONTINUED | OUTPATIENT
Start: 2021-10-23 | End: 2021-10-30

## 2021-10-23 RX ORDER — ACETAMINOPHEN 500 MG
650 TABLET ORAL EVERY 6 HOURS
Refills: 0 | Status: DISCONTINUED | OUTPATIENT
Start: 2021-10-23 | End: 2021-10-30

## 2021-10-23 RX ORDER — LEVETIRACETAM 250 MG/1
500 TABLET, FILM COATED ORAL EVERY 12 HOURS
Refills: 0 | Status: DISCONTINUED | OUTPATIENT
Start: 2021-10-23 | End: 2021-10-28

## 2021-10-23 RX ORDER — SENNA PLUS 8.6 MG/1
2 TABLET ORAL AT BEDTIME
Refills: 0 | Status: DISCONTINUED | OUTPATIENT
Start: 2021-10-23 | End: 2021-10-30

## 2021-10-23 RX ORDER — LEVETIRACETAM 250 MG/1
1 TABLET, FILM COATED ORAL
Qty: 0 | Refills: 0 | DISCHARGE
Start: 2021-10-23 | End: 2021-10-24

## 2021-10-23 RX ORDER — MECLIZINE HCL 12.5 MG
12.5 TABLET ORAL THREE TIMES A DAY
Refills: 0 | Status: DISCONTINUED | OUTPATIENT
Start: 2021-10-24 | End: 2021-10-28

## 2021-10-23 RX ADMIN — Medication 5 MILLIGRAM(S): at 05:23

## 2021-10-23 RX ADMIN — ONDANSETRON 4 MILLIGRAM(S): 8 TABLET, FILM COATED ORAL at 09:49

## 2021-10-23 RX ADMIN — Medication 650 MILLIGRAM(S): at 05:23

## 2021-10-23 RX ADMIN — Medication 12.5 MILLIGRAM(S): at 09:49

## 2021-10-23 RX ADMIN — LEVETIRACETAM 500 MILLIGRAM(S): 250 TABLET, FILM COATED ORAL at 05:23

## 2021-10-23 RX ADMIN — Medication 1 TABLET(S): at 12:49

## 2021-10-23 RX ADMIN — LEVETIRACETAM 500 MILLIGRAM(S): 250 TABLET, FILM COATED ORAL at 18:46

## 2021-10-23 RX ADMIN — OXYCODONE HYDROCHLORIDE 5 MILLIGRAM(S): 5 TABLET ORAL at 09:49

## 2021-10-23 RX ADMIN — OXYCODONE HYDROCHLORIDE 5 MILLIGRAM(S): 5 TABLET ORAL at 10:22

## 2021-10-23 RX ADMIN — BUPROPION HYDROCHLORIDE 150 MILLIGRAM(S): 150 TABLET, EXTENDED RELEASE ORAL at 12:48

## 2021-10-23 RX ADMIN — Medication 1 SPRAY(S): at 14:02

## 2021-10-23 RX ADMIN — Medication 650 MILLIGRAM(S): at 21:52

## 2021-10-23 RX ADMIN — PANTOPRAZOLE SODIUM 40 MILLIGRAM(S): 20 TABLET, DELAYED RELEASE ORAL at 05:23

## 2021-10-23 RX ADMIN — SENNA PLUS 2 TABLET(S): 8.6 TABLET ORAL at 21:52

## 2021-10-23 RX ADMIN — Medication 650 MILLIGRAM(S): at 22:37

## 2021-10-23 RX ADMIN — Medication 150 MILLIGRAM(S): at 12:48

## 2021-10-23 RX ADMIN — Medication 650 MILLIGRAM(S): at 14:18

## 2021-10-23 RX ADMIN — Medication 12.5 MILLIGRAM(S): at 14:18

## 2021-10-23 NOTE — H&P ADULT - HISTORY OF PRESENT ILLNESS
A 66 year old female presented with headache and nausea s/p mechanical fall down stairs. Patient took last ASA 3 days ago. ARU upon admission was 632. Patient was admitted to neurosurgery on 10/17/2021 for R frontal tSAH, bifrontal sdh, and Lt suboccipital skull fracture. 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 Serial CT scans of head were performed during this admission with the last stable one on 10/20. Hospitalist consult was appreciated. Patient was advised to have RASHEEDA stocking and hydration for orthostatic hypotension. Psychiatry consult was appreciated and adjusted her anti-depressant giving her dizziness/vertigo after fall. Neurology and ENT consults were also appreciated for vertigo. Patient was started on meclizine as needed for dizziness and recommended with vestibular rehab.     Patient was medically stable for discharge to  for AC 10/23/21.

## 2021-10-23 NOTE — DISCHARGE NOTE NURSING/CASE MANAGEMENT/SOCIAL WORK - PATIENT PORTAL LINK FT
You can access the FollowMyHealth Patient Portal offered by [facility] by registering at the following website: [email or web address]. By joining [facility]’s FollowMyHealth portal, you will also be able to view your health information using other applications (apps) compatible with our system.

## 2021-10-23 NOTE — H&P ADULT - NSHPREVIEWOFSYSTEMS_GEN_ALL_CORE
REVIEW OF SYSTEMS  Constitutional: No fever, No Chills, No fatigue  HEENT: No eye pain, No visual disturbances, No difficulty hearing, No Dysphagia, +vertigo  Pulm: No cough,  No shortness of breath  Cardio: No chest pain, No palpitations  GI:  No abdominal pain, No nausea, No vomiting, No diarrhea, No constipation, No incontinence   : No dysuria, No frequency, No hematuria, No incontinence  Neuro: No headaches, No memory loss, +loss of strength, No numbness, No tremors  Skin: No itching, No rashes, No lesions   Endo: No temperature intolerance  MSK: No joint pain, No joint swelling, No muscle pain, No Neck or back pain  Psych:  No depression, No anxiety REVIEW OF SYSTEMS  Constitutional: No fever, No Chills, No fatigue  HEENT: No eye pain, No visual disturbances, No difficulty hearing, No Dysphagia, +vertigo, +headache  Pulm: No cough,  No shortness of breath  Cardio: No chest pain, No palpitations  GI:  No abdominal pain, No nausea, No vomiting, No diarrhea, No constipation, No incontinence   : No dysuria, No frequency, No hematuria, No incontinence  Neuro: No headaches, No memory loss, +loss of strength, No numbness, No tremors  Skin: No itching, No rashes, No lesions   Endo: No temperature intolerance  MSK: No joint pain, No joint swelling, No muscle pain, No Neck or back pain  Psych:  No depression, No anxiety

## 2021-10-23 NOTE — H&P ADULT - ASSESSMENT
Assessment/Plan:  ROSETTA LOWRY is a 66y presented to [facility] 10/17/2021 after mechanical fall, found to have R frontal tSAH, bifrontal sdh, and Lt suboccipital skull fracture. No neurosurgical intervention. Hospital course complicated by vertigo likely BPPV after trauma.      B/L frontal SDH/SAH and Calvarial fx  - Start comprehensive rehab program, PT/OT/SLP 3 hours a day, 5 days a week.  - No surgical intervention.   - Precautions: falls, skull fracture    #Vertigo  - Likely BPPV S/P trauma  - Continue Meclizine PRN  - Declined Epley maneuver due to back issues per ENT at [facility]  - Vestibular therapy  - F/u neuro and ENT outpatient    #Depression  - Continue Effexor XR 150mg daily  - Continue Wellbutrin  mg daily    #Unilateral kidney    #Sleep:   - Maintain quiet hours and low stim environment.  - Melatonin 3mg QHS to maximize participation in therapy during the day.   - Monitor sleep logs    GI/Bowel:  - At risk for constipation due to neurologic diagnosis, immobility and/or medication use  - Senna QHS, Miralax PRN Daily  - GI ppx:    /Bladder:   - At risk for incontinence and retention due to neurologic diagnosis and limited mobility  - Continue catheter/bladder nursing protocol with bladder scans on admission with straight cath for >400cc.  - Encourage timed voids every 4 hours while awake for independence and to promote continence during therapy.    Skin/Pressure Injury:   - At risk for pressure injury due to neurologic diagnosis and relative immobility.  - Skin assessment on admission: ***  - Monitor Incisions: ***  - Turn every 2 hours while in bed, air mattress  - Soft heel protectors  - Skin barrier cream as needed  - Nursing to monitor skin Qshift    DVT ppx:  - Lovenox  - SCDs  ---------------  Outpatient Follow-up (Specialty/Name of physician):  Ulisses Chappell)  Neurosurgery  [address], [address]  [place] [address]  Phone: [phone]  Fax: [phone]  Follow Up Time: 1 week    Martinez Lewis (DO)  Neurology  [address], [address]  [place] [address]  Phone: [phone]  Fax: [phone]  Follow Up Time: Routine    Pepe Hernandez)  Otolaryngology  [address] [place] [address], [address]  [place] [address]  Phone: [phone]  Fax: [phone]  --------------  Goals: Safe discharge to home  Estimated Length of Stay: 10-14 days  Rehab Potential: Good  Medical Prognosis: Good  Estimated Disposition: Home with Home Care  ---------------    PRESCREEN COMPARISON:  I have reviewed the prescreen information and I have found no relevant changes between the preadmission screening and my post admission evaluation.    RATIONALE FOR INPATIENT ADMISSION: Patient demonstrates the following:  [X] Medically appropriate for rehabilitation admission  [X] Has attainable rehab goals with an appropriate initial discharge plan  [X]Has rehabilitation potential (expected to make a significant improvement within a reasonable period of time)  [X] Requires close medical management by a rehab physician, rehab nursing care, Hospitalist and comprehensive interdisciplinary team (including PT, OT and/or SLP, Prosthetics and Orthotics)       Assessment/Plan:  ROSETTA LOWRY is a 66y presented to [facility] 10/17/2021 after mechanical fall, found to have R frontal tSAH, bifrontal sdh, and Lt suboccipital skull fracture. No neurosurgical intervention. Hospital course complicated by vertigo likely BPPV after trauma.      B/L frontal SDH/SAH and Calvarial fx  - Start comprehensive rehab program, PT/OT/SLP 3 hours a day, 5 days a week.  - No surgical intervention.   - On Keppra for seizure ppx  - Precautions: falls, skull fracture, seizure   - F/u neuro outpatient    #Vertigo  - Likely BPPV S/P trauma  - Continue Meclizine PRN  - Declined Epley maneuver due to back issues per ENT at [facility]  - Vestibular therapy  - F/u neuro and ENT outpatient    #Depression  - Continue Effexor XR 150mg daily  - Continue Wellbutrin  mg daily    #Unilateral kidney    #Sleep:   - Maintain quiet hours and low stim environment.  - Melatonin 3mg QHS to maximize participation in therapy during the day.   - Monitor sleep logs    GI/Bowel:  - At risk for constipation due to neurologic diagnosis, immobility and/or medication use  - Senna QHS, Miralax PRN Daily  - GI ppx:    /Bladder:   - At risk for incontinence and retention due to neurologic diagnosis and limited mobility  - Continue catheter/bladder nursing protocol with bladder scans on admission with straight cath for >400cc.  - Encourage timed voids every 4 hours while awake for independence and to promote continence during therapy.    Skin/Pressure Injury:   - At risk for pressure injury due to neurologic diagnosis and relative immobility.  - Skin assessment on admission: ***  - Monitor Incisions: ***  - Turn every 2 hours while in bed, air mattress  - Soft heel protectors  - Skin barrier cream as needed  - Nursing to monitor skin Qshift    DVT ppx:  - Lovenox  - SCDs  ---------------  Outpatient Follow-up (Specialty/Name of physician):  Ulisses Chappell)  Neurosurgery  [address], [address]  [place] [address]  Phone: [phone]  Fax: [phone]  Follow Up Time: 1 week    Martinez Lewis (DO)  Neurology  [address], [address]  [place] [address]  Phone: [phone]  Fax: [phone]  Follow Up Time: Routine    Pepe Hernandez)  Otolaryngology  [address] [place] [address], [address]  [place] [address]  Phone: [phone]  Fax: [phone]  --------------  Goals: Safe discharge to home  Estimated Length of Stay: 10-14 days  Rehab Potential: Good  Medical Prognosis: Good  Estimated Disposition: Home with Home Care  ---------------    PRESCREEN COMPARISON:  I have reviewed the prescreen information and I have found no relevant changes between the preadmission screening and my post admission evaluation.    RATIONALE FOR INPATIENT ADMISSION: Patient demonstrates the following:  [X] Medically appropriate for rehabilitation admission  [X] Has attainable rehab goals with an appropriate initial discharge plan  [X]Has rehabilitation potential (expected to make a significant improvement within a reasonable period of time)  [X] Requires close medical management by a rehab physician, rehab nursing care, Hospitalist and comprehensive interdisciplinary team (including PT, OT and/or SLP, Prosthetics and Orthotics)       Assessment/Plan:  ROSETTA LOWRY is a 66y presented to [facility] 10/17/2021 after mechanical fall, found to have R frontal tSAH, bifrontal sdh, and Lt suboccipital skull fracture. No neurosurgical intervention. Hospital course complicated by vertigo likely BPPV after trauma.      B/L frontal SDH/SAH and Calvarial fx  - Start comprehensive rehab program, PT/OT/SLP 3 hours a day, 5 days a week.  - No surgical intervention.   - On Keppra for seizure ppx  - Precautions: falls, skull fracture, seizure   - F/u neuro outpatient    #Vertigo  - Likely BPPV S/P trauma  - Continue Meclizine PRN  - Declined Epley maneuver due to back issues per ENT at [facility]  - Vestibular therapy  - F/u neuro and ENT outpatient    #Depression  - Continue Effexor XR 150mg daily  - Continue Wellbutrin  mg daily    #Unilateral kidney    #Sleep:   - Maintain quiet hours and low stim environment.  - Melatonin 3mg QHS to maximize participation in therapy during the day.   - Monitor sleep logs    GI/Bowel:  - At risk for constipation due to neurologic diagnosis, immobility and/or medication use  - Senna QHS, Miralax PRN Daily    /Bladder:   - At risk for incontinence and retention due to neurologic diagnosis and limited mobility  - Continue catheter/bladder nursing protocol with bladder scans on admission with straight cath for >400cc.  - Encourage timed voids every 4 hours while awake for independence and to promote continence during therapy.    DVT ppx:  - Lovenox  - SCDs  ---------------  Outpatient Follow-up (Specialty/Name of physician):  Ulisses Chappell)  Neurosurgery  [address], [address]  [place] [address]  Phone: [phone]  Fax: [phone]  Follow Up Time: 1 week    Martinez Lewis (DO)  Neurology  [address], [address]  [place] [address]  Phone: [phone]  Fax: [phone]  Follow Up Time: Routine    Pepe Hernandez)  Otolaryngology  [address], [address]  [place] [address]  Phone: [phone]  Fax: [phone]  --------------  Goals: Safe discharge to home  Estimated Length of Stay: 10-14 days  Rehab Potential: Good  Medical Prognosis: Good  Estimated Disposition: Home with Home Care  ---------------    PRESCREEN COMPARISON:  I have reviewed the prescreen information and I have found no relevant changes between the preadmission screening and my post admission evaluation.    RATIONALE FOR INPATIENT ADMISSION: Patient demonstrates the following:  [X] Medically appropriate for rehabilitation admission  [X] Has attainable rehab goals with an appropriate initial discharge plan  [X]Has rehabilitation potential (expected to make a significant improvement within a reasonable period of time)  [X] Requires close medical management by a rehab physician, rehab nursing care, Hospitalist and comprehensive interdisciplinary team (including PT, OT and/or SLP, Prosthetics and Orthotics)

## 2021-10-23 NOTE — PATIENT PROFILE ADULT - NSPROGENSOURCEINFO_GEN_A_NUR
GENERAL: Awake, alert, NAD  LUNGS: CTAB, no wheezes or crackles   CARDIAC: RRR, no m/r/g  ABDOMEN: Soft, non tender, non distended, no rebound,   BACK: No midline spinal tenderness, no paraspinal tenderness   EXT: No edema, no calf tenderness, 2+ DP pulses bilaterally, no deformities. + straight leg raise on left at 50-60 degrees, able to ambulate w/o assistance, pain elicited with flexion of knee on right side   NEURO: A&Ox3. Moving all extremities , Neurovascularly intact in legs, no sensory deficits   SKIN: Warm and dry. No rash.  PSYCH: Normal affect.
patient

## 2021-10-23 NOTE — H&P ADULT - NSHPSOCIALHISTORY_GEN_ALL_CORE
SOCIAL HISTORY  Smoking - Denied  EtOH - Denied   Drugs - Denied    FUNCTIONAL HISTORY  Pt reports she lives in a private house with 3 steps to enter & first floor set up. Pt lives with her daughter who works from home. Pt reports she was independent with all mobility & ADL's prior to admit. + shower stall. +seat and gb, No other ae    CURRENT FUNCTIONAL STATUS  - Bed Mobility:   - Transfers: CG/supervision  - Gait: 50' x2 RW  - ADLs: independent SOCIAL HISTORY  Smoking - quit in 1992  EtOH - Denied   Drugs - Denied    FUNCTIONAL HISTORY  Pt reports she lives in a private house with 3 steps to enter & first floor set up. Pt lives with her daughter who works from home. Pt reports she was independent with all mobility & ADL's prior to admit. + shower stall. +seat and gb, No other ae. Brother also offered his home to patient.     CURRENT FUNCTIONAL STATUS  - Transfers: CG/supervision  - Gait: 50' x2 RW  - ADLs: independent

## 2021-10-23 NOTE — H&P ADULT - NSHPPHYSICALEXAM_GEN_ALL_CORE
Vital Signs  T(C): 36.6 (10-23-21 @ 11:25), Max: 36.9 (10-22-21 @ 20:03)  HR: 66 (10-23-21 @ 11:25) (64 - 71)  BP: 119/81 (10-23-21 @ 11:25) (110/71 - 125/75)  RR: 18 (10-23-21 @ 11:25) (18 - 18)  SpO2: 99% (10-23-21 @ 11:25) (97% - 100%)    Gen - NAD, Comfortable  HEENT - NCAT, EOMI, MMM  Neck - Supple, No limited ROM  Pulm - CTAB, No wheeze, No rhonchi, No crackles  Cardiovascular - RRR, S1S2, No m/r/g  Abdomen - Soft, NT/ND, +BS  Extremities - No C/C/E, No calf tenderness  Neuro-     Cognitive - AAOx3     Communication - Fluent, No dysarthria     Attention: Intact      Judgement: Good evidence of judgement     Memory: Recall 3 objects immediate and 3 min later         Cranial Nerves - CN 2-12 intact     Motor -                    LEFT    UE - ShAB 5/5, EF 5/5, EE 5/5, WE 5/5,  5/5                    RIGHT UE - ShAB 5/5, EF 5/5, EE 5/5, WE 5/5,  5/5                    LEFT    LE - HF 5/5, KE 5/5, DF 5/5, PF 5/5                    RIGHT LE - HF 5/5, KE 5/5, DF 5/5, PF 5/5        Sensory - Intact to LT     Reflexes - DTR Intact, No primitive reflex     Coordination - FTN intact     Tone - normal  Psychiatric - Mood stable, Affect WNL  Skin: Intact  Wounds: None Present Vital Signs  T(C): 36.6 (10-23-21 @ 11:25), Max: 36.9 (10-22-21 @ 20:03)  HR: 66 (10-23-21 @ 11:25) (64 - 71)  BP: 119/81 (10-23-21 @ 11:25) (110/71 - 125/75)  RR: 18 (10-23-21 @ 11:25) (18 - 18)  SpO2: 99% (10-23-21 @ 11:25) (97% - 100%)    Gen - NAD, Comfortable  HEENT - NCAT, EOMI, MMM  Neck - Supple, No limited ROM  Pulm - CTAB, No wheeze, No rhonchi, No crackles  Cardiovascular - RRR, S1S2, No m/r/g  Abdomen - Soft, NT/ND, +BS  Extremities - No C/C/E, No calf tenderness  Neuro-     Cognitive - AAOx3     Communication - Fluent, No dysarthria     Attention: Intact      Judgement: Good evidence of judgement     Cranial Nerves - CN 2-12 intact     Motor -                    LEFT    UE - ShAB 5/5, EF 5/5, EE 5/5, WE 5/5,  5/5                    RIGHT UE - ShAB 5/5, EF 5/5, EE 5/5, WE 5/5,  5/5                    LEFT    LE - HF 5/5, KE 5/5, DF 5/5, PF 5/5                    RIGHT LE - HF 5/5, KE 5/5, DF 5/5, PF 5/5        Sensory - Intact to LT     Reflexes - DTR Intact     Tone - normal  Psychiatric - Mood stable, Affect WNL  Skin: Intact, scatted ecchymosis on B/L UE and RLE

## 2021-10-23 NOTE — H&P ADULT - NSHPLABSRESULTS_GEN_ALL_CORE
RECENT LABS    Complete Blood Count in AM (10.21.21 @ 06:32)   Nucleated RBC: 0 /100 WBCs   WBC Count: 7.93 K/uL   RBC Count: 4.05 M/uL   Hemoglobin: 12.5 g/dL   Hematocrit: 38.2 %   Mean Cell Volume: 94.3 fl   Mean Cell Hemoglobin: 30.9 pg   Mean Cell Hemoglobin Conc: 32.7 gm/dL   Red Cell Distrib Width: 13.2 %   Platelet Count - Automated: 202 K/uL Basic Metabolic Panel in AM (10.21.21 @ 06:24)   Sodium, Serum: 135 mmol/L   Potassium, Serum: 4.4 mmol/L   Chloride, Serum: 101 mmol/L   Carbon Dioxide, Serum: 21 mmol/L   Anion Gap, Serum: 13 mmol/L   Blood Urea Nitrogen, Serum: 18 mg/dL   Creatinine, Serum: 1.21 mg/dL   Glucose, Serum: 96 mg/dL   Calcium, Total Serum: 9.0 mg/dL < from: CT Head No Cont (10.20.21 @ 09:00) >    IMPRESSION:  1.  Similar-appearing bifrontal predominantly hypodense subdural hematomas.  2.  Evaluation of the bilateral anterior frontal lobes is limited due to artifact. Within this limitation, there is suggestion of foci of hyperdensity in the bilateral anterior inferior frontal lobes with surrounding hypodensity, raising suspicion for hemorrhagic contusions.    < end of copied text >    < from: Xray Pelvis AP only (10.17.21 @ 18:24) >    Contrast material noted within the bladder.    No acute displaced fracture within the pelvis. Minimal bilateral hip arthrosis. Mineralization overlying the greater trochanter of the right femur, which may be enthesopathic or be related to calcific tendinitis.    < end of copied text >    < from: CT Cervical Spine No Cont (10.17.21 @ 19:14) >    IMPRESSION:    Head CT: Acute subarachnoid hemorrhage in the right frontal convexity with a acute nondisplaced left suboccipital calvarialfracture. Left parieto-occipital extracalvarial soft tissue swelling    Cervical spine CT: No acute fracture or subluxation.    < end of copied text >    < from: CT Abdomen and Pelvis w/ IV Cont (10.17.21 @ 19:16) >    IMPRESSION:    No acute intrathoracic or intra-abdominal/pelvic pathology.    < end of copied text > RECENT LABS    Complete Blood Count in AM (10.21.21 @ 06:32)   Nucleated RBC: 0 /100 WBCs   WBC Count: 7.93 K/uL   RBC Count: 4.05 M/uL   Hemoglobin: 12.5 g/dL   Hematocrit: 38.2 %   Mean Cell Volume: 94.3 fl   Mean Cell Hemoglobin: 30.9 pg   Mean Cell Hemoglobin Conc: 32.7 gm/dL   Red Cell Distrib Width: 13.2 %   Platelet Count - Automated: 202 K/uL     Basic Metabolic Panel in AM (10.21.21 @ 06:24)   Sodium, Serum: 135 mmol/L   Potassium, Serum: 4.4 mmol/L   Chloride, Serum: 101 mmol/L   Carbon Dioxide, Serum: 21 mmol/L   Anion Gap, Serum: 13 mmol/L   Blood Urea Nitrogen, Serum: 18 mg/dL   Creatinine, Serum: 1.21 mg/dL   Glucose, Serum: 96 mg/dL   Calcium, Total Serum: 9.0 mg/dL     < from: CT Head No Cont (10.20.21 @ 09:00) >    IMPRESSION:  1.  Similar-appearing bifrontal predominantly hypodense subdural hematomas.  2.  Evaluation of the bilateral anterior frontal lobes is limited due to artifact. Within this limitation, there is suggestion of foci of hyperdensity in the bilateral anterior inferior frontal lobes with surrounding hypodensity, raising suspicion for hemorrhagic contusions.    < end of copied text >    < from: Xray Pelvis AP only (10.17.21 @ 18:24) >    Contrast material noted within the bladder.    No acute displaced fracture within the pelvis. Minimal bilateral hip arthrosis. Mineralization overlying the greater trochanter of the right femur, which may be enthesopathic or be related to calcific tendinitis.    < end of copied text >    < from: CT Cervical Spine No Cont (10.17.21 @ 19:14) >    IMPRESSION:    Head CT: Acute subarachnoid hemorrhage in the right frontal convexity with a acute nondisplaced left suboccipital calvarialfracture. Left parieto-occipital extracalvarial soft tissue swelling    Cervical spine CT: No acute fracture or subluxation.    < end of copied text >    < from: CT Abdomen and Pelvis w/ IV Cont (10.17.21 @ 19:16) >    IMPRESSION:    No acute intrathoracic or intra-abdominal/pelvic pathology.    < end of copied text >

## 2021-10-24 LAB
ALBUMIN SERPL ELPH-MCNC: 3.1 G/DL — LOW (ref 3.3–5)
ALP SERPL-CCNC: 95 U/L — SIGNIFICANT CHANGE UP (ref 40–120)
ALT FLD-CCNC: 21 U/L — SIGNIFICANT CHANGE UP (ref 10–45)
ANION GAP SERPL CALC-SCNC: 7 MMOL/L — SIGNIFICANT CHANGE UP (ref 5–17)
AST SERPL-CCNC: 16 U/L — SIGNIFICANT CHANGE UP (ref 10–40)
BASOPHILS # BLD AUTO: 0.05 K/UL — SIGNIFICANT CHANGE UP (ref 0–0.2)
BASOPHILS NFR BLD AUTO: 0.6 % — SIGNIFICANT CHANGE UP (ref 0–2)
BILIRUB SERPL-MCNC: 0.7 MG/DL — SIGNIFICANT CHANGE UP (ref 0.2–1.2)
BUN SERPL-MCNC: 19 MG/DL — SIGNIFICANT CHANGE UP (ref 7–23)
CALCIUM SERPL-MCNC: 8.7 MG/DL — SIGNIFICANT CHANGE UP (ref 8.4–10.5)
CHLORIDE SERPL-SCNC: 95 MMOL/L — LOW (ref 96–108)
CO2 SERPL-SCNC: 28 MMOL/L — SIGNIFICANT CHANGE UP (ref 22–31)
COVID-19 SPIKE DOMAIN AB INTERP: POSITIVE
COVID-19 SPIKE DOMAIN ANTIBODY RESULT: >250 U/ML — HIGH
CREAT SERPL-MCNC: 1.31 MG/DL — HIGH (ref 0.5–1.3)
EOSINOPHIL # BLD AUTO: 0.18 K/UL — SIGNIFICANT CHANGE UP (ref 0–0.5)
EOSINOPHIL NFR BLD AUTO: 2.2 % — SIGNIFICANT CHANGE UP (ref 0–6)
GLUCOSE SERPL-MCNC: 90 MG/DL — SIGNIFICANT CHANGE UP (ref 70–99)
HCT VFR BLD CALC: 41.6 % — SIGNIFICANT CHANGE UP (ref 34.5–45)
HGB BLD-MCNC: 14 G/DL — SIGNIFICANT CHANGE UP (ref 11.5–15.5)
IMM GRANULOCYTES NFR BLD AUTO: 0.4 % — SIGNIFICANT CHANGE UP (ref 0–1.5)
LYMPHOCYTES # BLD AUTO: 1.8 K/UL — SIGNIFICANT CHANGE UP (ref 1–3.3)
LYMPHOCYTES # BLD AUTO: 22.1 % — SIGNIFICANT CHANGE UP (ref 13–44)
MCHC RBC-ENTMCNC: 31.1 PG — SIGNIFICANT CHANGE UP (ref 27–34)
MCHC RBC-ENTMCNC: 33.7 GM/DL — SIGNIFICANT CHANGE UP (ref 32–36)
MCV RBC AUTO: 92.4 FL — SIGNIFICANT CHANGE UP (ref 80–100)
MONOCYTES # BLD AUTO: 0.74 K/UL — SIGNIFICANT CHANGE UP (ref 0–0.9)
MONOCYTES NFR BLD AUTO: 9.1 % — SIGNIFICANT CHANGE UP (ref 2–14)
NEUTROPHILS # BLD AUTO: 5.36 K/UL — SIGNIFICANT CHANGE UP (ref 1.8–7.4)
NEUTROPHILS NFR BLD AUTO: 65.6 % — SIGNIFICANT CHANGE UP (ref 43–77)
NRBC # BLD: 0 /100 WBCS — SIGNIFICANT CHANGE UP (ref 0–0)
PLATELET # BLD AUTO: 216 K/UL — SIGNIFICANT CHANGE UP (ref 150–400)
POTASSIUM SERPL-MCNC: 3.7 MMOL/L — SIGNIFICANT CHANGE UP (ref 3.5–5.3)
POTASSIUM SERPL-SCNC: 3.7 MMOL/L — SIGNIFICANT CHANGE UP (ref 3.5–5.3)
PROT SERPL-MCNC: 6.9 G/DL — SIGNIFICANT CHANGE UP (ref 6–8.3)
RBC # BLD: 4.5 M/UL — SIGNIFICANT CHANGE UP (ref 3.8–5.2)
RBC # FLD: 13.2 % — SIGNIFICANT CHANGE UP (ref 10.3–14.5)
SARS-COV-2 IGG+IGM SERPL QL IA: >250 U/ML — HIGH
SARS-COV-2 IGG+IGM SERPL QL IA: POSITIVE
SARS-COV-2 RNA SPEC QL NAA+PROBE: SIGNIFICANT CHANGE UP
SODIUM SERPL-SCNC: 130 MMOL/L — LOW (ref 135–145)
WBC # BLD: 8.16 K/UL — SIGNIFICANT CHANGE UP (ref 3.8–10.5)
WBC # FLD AUTO: 8.16 K/UL — SIGNIFICANT CHANGE UP (ref 3.8–10.5)

## 2021-10-24 PROCEDURE — 99232 SBSQ HOSP IP/OBS MODERATE 35: CPT

## 2021-10-24 PROCEDURE — 99222 1ST HOSP IP/OBS MODERATE 55: CPT

## 2021-10-24 RX ADMIN — Medication 150 MILLIGRAM(S): at 12:45

## 2021-10-24 RX ADMIN — Medication 650 MILLIGRAM(S): at 20:20

## 2021-10-24 RX ADMIN — Medication 650 MILLIGRAM(S): at 18:24

## 2021-10-24 RX ADMIN — PANTOPRAZOLE SODIUM 40 MILLIGRAM(S): 20 TABLET, DELAYED RELEASE ORAL at 06:04

## 2021-10-24 RX ADMIN — LEVETIRACETAM 500 MILLIGRAM(S): 250 TABLET, FILM COATED ORAL at 18:23

## 2021-10-24 RX ADMIN — ENOXAPARIN SODIUM 40 MILLIGRAM(S): 100 INJECTION SUBCUTANEOUS at 18:26

## 2021-10-24 RX ADMIN — Medication 5 MILLIGRAM(S): at 18:23

## 2021-10-24 RX ADMIN — POLYETHYLENE GLYCOL 3350 17 GRAM(S): 17 POWDER, FOR SOLUTION ORAL at 06:04

## 2021-10-24 RX ADMIN — SENNA PLUS 2 TABLET(S): 8.6 TABLET ORAL at 21:34

## 2021-10-24 RX ADMIN — LEVETIRACETAM 500 MILLIGRAM(S): 250 TABLET, FILM COATED ORAL at 06:04

## 2021-10-24 RX ADMIN — Medication 1 SPRAY(S): at 21:34

## 2021-10-24 RX ADMIN — POLYETHYLENE GLYCOL 3350 17 GRAM(S): 17 POWDER, FOR SOLUTION ORAL at 18:23

## 2021-10-24 RX ADMIN — Medication 1 SPRAY(S): at 15:45

## 2021-10-24 RX ADMIN — Medication 1 TABLET(S): at 12:45

## 2021-10-24 NOTE — PROGRESS NOTE ADULT - SUBJECTIVE AND OBJECTIVE BOX
Pt. seen and examined at bedside.  No overnight events.  c/o lightheadedness/vertigo.      REVIEW OF SYSTEMS  Constitutional - No fever,  No fatigue  Neurological - ++ headaches, No loss of strength  Musculoskeletal - No joint pain, No joint swelling, ++ muscle pain    VITALS  T(C): 36.8 (10-24-21 @ 08:14), Max: 36.8 (10-24-21 @ 08:14)  HR: 87 (10-24-21 @ 08:14) (64 - 87)  BP: 111/76 (10-24-21 @ 08:14) (111/76 - 125/75)  RR: 16 (10-24-21 @ 08:14) (15 - 18)  SpO2: 95% (10-24-21 @ 08:14) (95% - 100%)  Wt(kg): --       MEDICATIONS   acetaminophen     Tablet .. 650 milliGRAM(s) every 6 hours PRN  levETIRAcetam 500 milliGRAM(s) every 12 hours  multivitamin 1 Tablet(s) daily  ondansetron   Disintegrating Tablet 4 milliGRAM(s) every 6 hours PRN  oxyCODONE    IR 5 milliGRAM(s) every 4 hours PRN  oxyCODONE    IR 10 milliGRAM(s) every 4 hours PRN  pantoprazole    Tablet 40 milliGRAM(s) before breakfast  polyethylene glycol 3350 17 Gram(s) every 12 hours  senna 2 Tablet(s) at bedtime  venlafaxine XR. 150 milliGRAM(s) daily      RECENT LABS/IMAGING                        14.0   8.16  )-----------( 216      ( 24 Oct 2021 05:11 )             41.6     10-24    130<L>  |  95<L>  |  19  ----------------------------<  90  3.7   |  28  |  1.31<H>    Ca    8.7      24 Oct 2021 05:11    TPro  6.9  /  Alb  3.1<L>  /  TBili  0.7  /  DBili  x   /  AST  16  /  ALT  21  /  AlkPhos  95  10-24                  ---------  PHYSICAL EXAM  Constitutional - NAD, Comfortable  HEENT - LEFT TEMPORAL/PARIETAL/OCCIPITAL AREAS TTP  Pulm - Breathing comfortably, No wheezing  Abd - Soft, NTND  Extremities - No edema, No calf tenderness  Neurologic Exam -                    Cognitive - Awake, Alert     Communication - Fluent     Motor - No focal deficits     Sensory - Intact to LT  Psychiatric - Mood WNL, Affect WNL    ASSESSMENT/PLAN  66y Female with functional deficits S/P SKULL FX -> SAH/SDH.  Continue current medical management  Pain - Tylenol PRN; OXYCODONE PRN.  DVT PPX - SCDs  SZ PPX - levETIRAcetam 500 milliGRAM(s) every 12 hours  Active issues - LABS REVIEWED  Continue 3hrs a day of comprehensive rehab program.

## 2021-10-25 LAB
ALBUMIN SERPL ELPH-MCNC: 3.1 G/DL — LOW (ref 3.3–5)
ALP SERPL-CCNC: 99 U/L — SIGNIFICANT CHANGE UP (ref 40–120)
ALT FLD-CCNC: 25 U/L — SIGNIFICANT CHANGE UP (ref 10–45)
ANION GAP SERPL CALC-SCNC: 9 MMOL/L — SIGNIFICANT CHANGE UP (ref 5–17)
AST SERPL-CCNC: 26 U/L — SIGNIFICANT CHANGE UP (ref 10–40)
BASOPHILS # BLD AUTO: 0.06 K/UL — SIGNIFICANT CHANGE UP (ref 0–0.2)
BASOPHILS NFR BLD AUTO: 0.8 % — SIGNIFICANT CHANGE UP (ref 0–2)
BILIRUB SERPL-MCNC: 0.7 MG/DL — SIGNIFICANT CHANGE UP (ref 0.2–1.2)
BUN SERPL-MCNC: 26 MG/DL — HIGH (ref 7–23)
CALCIUM SERPL-MCNC: 9.1 MG/DL — SIGNIFICANT CHANGE UP (ref 8.4–10.5)
CHLORIDE SERPL-SCNC: 94 MMOL/L — LOW (ref 96–108)
CO2 SERPL-SCNC: 25 MMOL/L — SIGNIFICANT CHANGE UP (ref 22–31)
CREAT SERPL-MCNC: 1.19 MG/DL — SIGNIFICANT CHANGE UP (ref 0.5–1.3)
EOSINOPHIL # BLD AUTO: 0.16 K/UL — SIGNIFICANT CHANGE UP (ref 0–0.5)
EOSINOPHIL NFR BLD AUTO: 2 % — SIGNIFICANT CHANGE UP (ref 0–6)
GLUCOSE SERPL-MCNC: 90 MG/DL — SIGNIFICANT CHANGE UP (ref 70–99)
HCT VFR BLD CALC: 41.2 % — SIGNIFICANT CHANGE UP (ref 34.5–45)
HGB BLD-MCNC: 14.1 G/DL — SIGNIFICANT CHANGE UP (ref 11.5–15.5)
IMM GRANULOCYTES NFR BLD AUTO: 0.6 % — SIGNIFICANT CHANGE UP (ref 0–1.5)
LYMPHOCYTES # BLD AUTO: 1.75 K/UL — SIGNIFICANT CHANGE UP (ref 1–3.3)
LYMPHOCYTES # BLD AUTO: 22.4 % — SIGNIFICANT CHANGE UP (ref 13–44)
MCHC RBC-ENTMCNC: 31.8 PG — SIGNIFICANT CHANGE UP (ref 27–34)
MCHC RBC-ENTMCNC: 34.2 GM/DL — SIGNIFICANT CHANGE UP (ref 32–36)
MCV RBC AUTO: 93 FL — SIGNIFICANT CHANGE UP (ref 80–100)
MONOCYTES # BLD AUTO: 0.76 K/UL — SIGNIFICANT CHANGE UP (ref 0–0.9)
MONOCYTES NFR BLD AUTO: 9.7 % — SIGNIFICANT CHANGE UP (ref 2–14)
NEUTROPHILS # BLD AUTO: 5.04 K/UL — SIGNIFICANT CHANGE UP (ref 1.8–7.4)
NEUTROPHILS NFR BLD AUTO: 64.5 % — SIGNIFICANT CHANGE UP (ref 43–77)
NRBC # BLD: 0 /100 WBCS — SIGNIFICANT CHANGE UP (ref 0–0)
PLATELET # BLD AUTO: 252 K/UL — SIGNIFICANT CHANGE UP (ref 150–400)
POTASSIUM SERPL-MCNC: 4 MMOL/L — SIGNIFICANT CHANGE UP (ref 3.5–5.3)
POTASSIUM SERPL-SCNC: 4 MMOL/L — SIGNIFICANT CHANGE UP (ref 3.5–5.3)
PROT SERPL-MCNC: 7 G/DL — SIGNIFICANT CHANGE UP (ref 6–8.3)
RBC # BLD: 4.43 M/UL — SIGNIFICANT CHANGE UP (ref 3.8–5.2)
RBC # FLD: 13.4 % — SIGNIFICANT CHANGE UP (ref 10.3–14.5)
SODIUM SERPL-SCNC: 128 MMOL/L — LOW (ref 135–145)
WBC # BLD: 7.82 K/UL — SIGNIFICANT CHANGE UP (ref 3.8–10.5)
WBC # FLD AUTO: 7.82 K/UL — SIGNIFICANT CHANGE UP (ref 3.8–10.5)

## 2021-10-25 PROCEDURE — 99233 SBSQ HOSP IP/OBS HIGH 50: CPT

## 2021-10-25 PROCEDURE — 99232 SBSQ HOSP IP/OBS MODERATE 35: CPT

## 2021-10-25 RX ADMIN — Medication 5 MILLIGRAM(S): at 17:20

## 2021-10-25 RX ADMIN — Medication 150 MILLIGRAM(S): at 11:49

## 2021-10-25 RX ADMIN — LEVETIRACETAM 500 MILLIGRAM(S): 250 TABLET, FILM COATED ORAL at 06:21

## 2021-10-25 RX ADMIN — POLYETHYLENE GLYCOL 3350 17 GRAM(S): 17 POWDER, FOR SOLUTION ORAL at 06:21

## 2021-10-25 RX ADMIN — Medication 1 SPRAY(S): at 06:21

## 2021-10-25 RX ADMIN — PANTOPRAZOLE SODIUM 40 MILLIGRAM(S): 20 TABLET, DELAYED RELEASE ORAL at 06:21

## 2021-10-25 RX ADMIN — Medication 1 SPRAY(S): at 21:08

## 2021-10-25 RX ADMIN — BUPROPION HYDROCHLORIDE 150 MILLIGRAM(S): 150 TABLET, EXTENDED RELEASE ORAL at 11:49

## 2021-10-25 RX ADMIN — POLYETHYLENE GLYCOL 3350 17 GRAM(S): 17 POWDER, FOR SOLUTION ORAL at 17:20

## 2021-10-25 RX ADMIN — Medication 5 MILLIGRAM(S): at 06:21

## 2021-10-25 RX ADMIN — SENNA PLUS 2 TABLET(S): 8.6 TABLET ORAL at 21:08

## 2021-10-25 RX ADMIN — Medication 650 MILLIGRAM(S): at 21:08

## 2021-10-25 RX ADMIN — Medication 1 TABLET(S): at 11:49

## 2021-10-25 RX ADMIN — Medication 1 SPRAY(S): at 14:15

## 2021-10-25 RX ADMIN — ENOXAPARIN SODIUM 40 MILLIGRAM(S): 100 INJECTION SUBCUTANEOUS at 17:20

## 2021-10-25 RX ADMIN — Medication 650 MILLIGRAM(S): at 22:00

## 2021-10-25 RX ADMIN — Medication 650 MILLIGRAM(S): at 10:31

## 2021-10-25 RX ADMIN — Medication 12.5 MILLIGRAM(S): at 06:49

## 2021-10-25 RX ADMIN — LEVETIRACETAM 500 MILLIGRAM(S): 250 TABLET, FILM COATED ORAL at 17:20

## 2021-10-25 NOTE — DIETITIAN INITIAL EVALUATION ADULT. - ORAL INTAKE PTA/DIET HISTORY
Patient Does Follow Low Sodium Diet @Home, Consumes 2 Meals a Day & Does Take Vitamin/Supplements @Home (Multivitamin, Vitamin D)    on Regular Diet w/ Thin Liquids   Education Provided on Need for Increased Fluids/Fiber

## 2021-10-25 NOTE — PROGRESS NOTE ADULT - SUBJECTIVE AND OBJECTIVE BOX
Patient is a 66y old  Female who presents with a chief complaint of SAH, ADH, and suboccipital skull fracture after mechanical fall (24 Oct 2021 12:52)  HPI:  A 66 year old female presented with headache and nausea s/p mechanical fall down stairs. Patient took last ASA 3 days ago. ARU upon admission was 632. Patient was admitted to neurosurgery on 10/17/2021 for R frontal tSAH, bifrontal sdh, and Lt suboccipital skull fracture. 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 Serial CT scans of head were performed during this admission with the last stable one on 10/20. Hospitalist consult was appreciated. Patient was advised to have RASHEEDA stocking and hydration for orthostatic hypotension. Psychiatry consult was appreciated and adjusted her anti-depressant giving her dizziness/vertigo after fall. Neurology and ENT consults were also appreciated for vertigo. Patient was started on meclizine as needed for dizziness and recommended with vestibular rehab.     Patient was medically stable for discharge to  for AC 10/23/21. (23 Oct 2021 15:00)      TODAY'S SUBJECTIVE & REVIEW OF SYMPTOMS  Patient reports some HA and vertigo  Denies blurry vision or tinnitus    Denies CP/palpitations/abdominal pain or nausea    Reports 2 falls on day of admission - 2nd with LOC of unclear duration. Had vertigo for 2 weeks prior . States that her antidepressant meds were adjusted recently         PHYSICAL EXAM    Vital Signs Last 24 Hrs  T(C): 36.4 (25 Oct 2021 08:26), Max: 37.2 (24 Oct 2021 20:30)  T(F): 97.6 (25 Oct 2021 08:26), Max: 98.9 (24 Oct 2021 20:30)  HR: 74 (25 Oct 2021 08:26) (74 - 83)  BP: 107/65 (25 Oct 2021 08:26) (92/64 - 119/70)  BP(mean): --  RR: 16 (25 Oct 2021 08:26) (15 - 16)  SpO2: 99% (25 Oct 2021 08:26) (98% - 99%)    Constitutional - NAD, Comfortable  Chest - CTAB  Cardiovascular - S1S2  Abdomen - BS+, Soft, NTND  Extremities - No C/C/E, No calf tenderness   Neurologic Exam -                    Cognitive - Awake, Alert, AAO to self, place, date, year, situation     Communication - Fluent, No dysarthria     Motor - No focal deficits                    LEFT    UE - ShAB 5/5, EF 5/5, EE 5/5, WE 5/5,  5/5                    RIGHT UE - ShAB 5/5, EF 5/5, EE 5/5, WE 5/5,  5/5                    LEFT    LE - HF 5/5, KE 5/5, DF 5/5, PF 5/5                    RIGHT LE - HF 5/5, KE 5/5, DF 5/5, PF 5/5        Sensory - Intact to LT   Psychiatric - Mood stable, Affect WNL      MEDICATIONS  (STANDING):  bisacodyl 5 milliGRAM(s) Oral every 12 hours  buPROPion XL (24-Hour) . 150 milliGRAM(s) Oral daily  enoxaparin Injectable 40 milliGRAM(s) SubCutaneous <User Schedule>  levETIRAcetam 500 milliGRAM(s) Oral every 12 hours  multivitamin 1 Tablet(s) Oral daily  pantoprazole    Tablet 40 milliGRAM(s) Oral before breakfast  polyethylene glycol 3350 17 Gram(s) Oral every 12 hours  senna 2 Tablet(s) Oral at bedtime  sodium chloride 0.65% Nasal 1 Spray(s) Both Nostrils three times a day  venlafaxine XR. 150 milliGRAM(s) Oral daily    MEDICATIONS  (PRN):  acetaminophen     Tablet .. 650 milliGRAM(s) Oral every 6 hours PRN Temp greater or equal to 38C (100.4F), Mild Pain (1 - 3)  meclizine 12.5 milliGRAM(s) Oral three times a day PRN Dizziness  melatonin 3 milliGRAM(s) Oral at bedtime PRN Insomnia  ondansetron   Disintegrating Tablet 4 milliGRAM(s) Oral every 6 hours PRN Nausea and/or Vomiting  oxyCODONE    IR 5 milliGRAM(s) Oral every 4 hours PRN Moderate Pain (4 - 6)  oxyCODONE    IR 10 milliGRAM(s) Oral every 4 hours PRN Severe Pain (7 - 10)                  RECENT LABS/IMAGING                        14.1   7.82  )-----------( 252      ( 25 Oct 2021 07:39 )             41.2     10-25    128<L>  |  94<L>  |  26<H>  ----------------------------<  90  4.0   |  25  |  1.19    Ca    9.1      25 Oct 2021 07:39    TPro  7.0  /  Alb  3.1<L>  /  TBili  0.7  /  DBili  x   /  AST  26  /  ALT  25  /  AlkPhos  99  10-25

## 2021-10-25 NOTE — PROGRESS NOTE ADULT - ASSESSMENT
# traumatic SAH/SDH 2/2 mechanical fall  # Suboccipital skull fracture   - keppra sz ppx    # vertigo  - meclizine    # orthostatic hypotension  - oral hydration, RASHEEDA hose, abd binder    # solitary kidney    # depression 65 yo F who was found to have a right frontal traumatic SAH, bifrontal SDH, and left suboccipital skull fracture following a mechanical fall down stairs.     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  - f/u TSH, uric acid, serum osm, urine lytes.   - agree w/ fluid restriction.   - may be medication side effect - ?wellbutrin ?venlafaxine. Will discuss further w/ pharmacy tomorrow.     # vertigo  - seen by Neurology during inpatient stay  - meclizine  - outpatient vestibular rehab.     # orthostatic hypotension  - pt had anti depressants titrated down by Psych  - oral hydration, RASHEEDA hose, abd binder  - daily orthostatics    # solitary kidney  - carefully monitor kidney function    # depression  - seen by psychiatry  - pt on wellbutrin and venlafaxine    dvt ppx: lovenox 65 yo F who was found to have a right frontal traumatic SAH, bifrontal SDH, and left suboccipital skull fracture following a mechanical fall down stairs.     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  - f/u TSH, uric acid, serum osm, urine lytes.   - agree w/ fluid restriction.   - may be medication side effect - ?wellbutrin ?venlafaxine. Will discuss further w/ pharmacy tomorrow.     # vertigo  - seen by Neurology during inpatient stay  - meclizine  - outpatient vestibular rehab.      # orthostatic hypotension  - pt had anti depressants titrated down by Psych  - oral hydration, RASHEEDA hose, abd binder  - daily orthostatics    # solitary kidney  - carefully monitor kidney function    # depression  - seen by psychiatry  - pt on wellbutrin and venlafaxine    dvt ppx: lovenox

## 2021-10-25 NOTE — PROGRESS NOTE ADULT - SUBJECTIVE AND OBJECTIVE BOX
Patient is a 66y old  Female who presents with a chief complaint of SAH, ADH, and suboccipital skull fracture after mechanical fall (25 Oct 2021 12:56)      24 HOUR EVENTS:  No overnight events reported.     SUBJECTIVE:  Patient seen and examined at bedside.     ALLERGIES:  No Known Allergies    MEDICATIONS  (STANDING):  bisacodyl 5 milliGRAM(s) Oral every 12 hours  buPROPion XL (24-Hour) . 150 milliGRAM(s) Oral daily  enoxaparin Injectable 40 milliGRAM(s) SubCutaneous <User Schedule>  levETIRAcetam 500 milliGRAM(s) Oral every 12 hours  multivitamin 1 Tablet(s) Oral daily  pantoprazole    Tablet 40 milliGRAM(s) Oral before breakfast  polyethylene glycol 3350 17 Gram(s) Oral every 12 hours  senna 2 Tablet(s) Oral at bedtime  sodium chloride 0.65% Nasal 1 Spray(s) Both Nostrils three times a day  venlafaxine XR. 150 milliGRAM(s) Oral daily    MEDICATIONS  (PRN):  acetaminophen     Tablet .. 650 milliGRAM(s) Oral every 6 hours PRN Temp greater or equal to 38C (100.4F), Mild Pain (1 - 3)  meclizine 12.5 milliGRAM(s) Oral three times a day PRN Dizziness  melatonin 3 milliGRAM(s) Oral at bedtime PRN Insomnia  ondansetron   Disintegrating Tablet 4 milliGRAM(s) Oral every 6 hours PRN Nausea and/or Vomiting  oxyCODONE    IR 5 milliGRAM(s) Oral every 4 hours PRN Moderate Pain (4 - 6)  oxyCODONE    IR 10 milliGRAM(s) Oral every 4 hours PRN Severe Pain (7 - 10)    Vital Signs Last 24 Hrs  T(F): 97.6 (25 Oct 2021 08:26), Max: 98.9 (24 Oct 2021 20:30)  HR: 74 (25 Oct 2021 08:26) (74 - 83)  BP: 107/65 (25 Oct 2021 08:26) (92/64 - 119/70)  RR: 16 (25 Oct 2021 08:26) (15 - 16)  SpO2: 99% (25 Oct 2021 08:26) (98% - 99%)  I&O's Summary    PHYSICAL EXAM:  General: NAD, A/O x 3  ENT: Moist mucous membranes, no thrush  Neck: Supple, No JVD  Lungs: Clear to auscultation bilaterally, good air entry, non-labored breathing  Cardio: RRR, S1/S2, No murmur  Abdomen: Soft, Nontender, Nondistended; Bowel sounds present  Extremities: No calf tenderness, No pitting edema    LABS:                        14.1   7.82  )-----------( 252      ( 25 Oct 2021 07:39 )             41.2     10-25    128  |  94  |  26  ----------------------------<  90  4.0   |  25  |  1.19    Ca    9.1      25 Oct 2021 07:39    TPro  7.0  /  Alb  3.1  /  TBili  0.7  /  DBili  x   /  AST  26  /  ALT  25  /  AlkPhos  99  10-25        eGFR if Non African American: 47 mL/min/1.73M2 (10-25-21 @ 07:39)  eGFR if : 54 mL/min/1.73M2 (10-25-21 @ 07:39)            COVID-19 PCR: NotDetec (10-23-21 @ 19:49)  COVID-19 PCR: NotDetec (10-21-21 @ 06:51)  COVID-19 PCR: NotDetec (10-19-21 @ 06:28)  COVID-19 PCR: NotDetec (10-17-21 @ 18:25)    RADIOLOGY & ADDITIONAL TESTS:    Care Discussed with Consultants/Other Providers:    Patient is a 66y old  Female who presents with a chief complaint of SAH, ADH, and suboccipital skull fracture after mechanical fall (25 Oct 2021 12:56)      24 HOUR EVENTS:  No overnight events reported.     SUBJECTIVE:  Patient seen and examined at bedside.   She complains of vertigo. It has been present since she sustained the hemorrhage.     ALLERGIES:  No Known Allergies    MEDICATIONS  (STANDING):  bisacodyl 5 milliGRAM(s) Oral every 12 hours  buPROPion XL (24-Hour) . 150 milliGRAM(s) Oral daily  enoxaparin Injectable 40 milliGRAM(s) SubCutaneous <User Schedule>  levETIRAcetam 500 milliGRAM(s) Oral every 12 hours  multivitamin 1 Tablet(s) Oral daily  pantoprazole    Tablet 40 milliGRAM(s) Oral before breakfast  polyethylene glycol 3350 17 Gram(s) Oral every 12 hours  senna 2 Tablet(s) Oral at bedtime  sodium chloride 0.65% Nasal 1 Spray(s) Both Nostrils three times a day  venlafaxine XR. 150 milliGRAM(s) Oral daily    MEDICATIONS  (PRN):  acetaminophen     Tablet .. 650 milliGRAM(s) Oral every 6 hours PRN Temp greater or equal to 38C (100.4F), Mild Pain (1 - 3)  meclizine 12.5 milliGRAM(s) Oral three times a day PRN Dizziness  melatonin 3 milliGRAM(s) Oral at bedtime PRN Insomnia  ondansetron   Disintegrating Tablet 4 milliGRAM(s) Oral every 6 hours PRN Nausea and/or Vomiting  oxyCODONE    IR 5 milliGRAM(s) Oral every 4 hours PRN Moderate Pain (4 - 6)  oxyCODONE    IR 10 milliGRAM(s) Oral every 4 hours PRN Severe Pain (7 - 10)    Vital Signs Last 24 Hrs  T(F): 97.6 (25 Oct 2021 08:26), Max: 98.9 (24 Oct 2021 20:30)  HR: 74 (25 Oct 2021 08:26) (74 - 83)  BP: 107/65 (25 Oct 2021 08:26) (92/64 - 119/70)  RR: 16 (25 Oct 2021 08:26) (15 - 16)  SpO2: 99% (25 Oct 2021 08:26) (98% - 99%)  I&O's Summary    PHYSICAL EXAM:  General: NAD, A/O x 3  ENT: Moist mucous membranes, no thrush  Neck: Supple, No JVD  Lungs: Clear to auscultation bilaterally, good air entry, non-labored breathing  Cardio: RRR, S1/S2, No murmur  Abdomen: Soft, Nontender, Nondistended; Bowel sounds present  Extremities: No calf tenderness, No pitting edema    LABS:                        14.1   7.82  )-----------( 252      ( 25 Oct 2021 07:39 )             41.2     10-25    128  |  94  |  26  ----------------------------<  90  4.0   |  25  |  1.19    Ca    9.1      25 Oct 2021 07:39    TPro  7.0  /  Alb  3.1  /  TBili  0.7  /  DBili  x   /  AST  26  /  ALT  25  /  AlkPhos  99  10-25        eGFR if Non African American: 47 mL/min/1.73M2 (10-25-21 @ 07:39)  eGFR if : 54 mL/min/1.73M2 (10-25-21 @ 07:39)            COVID-19 PCR: NotDetec (10-23-21 @ 19:49)  COVID-19 PCR: NotDetec (10-21-21 @ 06:51)  COVID-19 PCR: NotDetec (10-19-21 @ 06:28)  COVID-19 PCR: NotDetec (10-17-21 @ 18:25)    RADIOLOGY & ADDITIONAL TESTS:  < from: CT Head No Cont (10.20.21 @ 09:00) >  IMPRESSION:  1.  Similar-appearing bifrontal predominantly hypodense subdural hematomas.  2.  Evaluation of the bilateral anterior frontal lobes is limited due to artifact. Within this limitation, there is suggestion of foci of hyperdensity in the bilateral anterior inferior frontal lobes with surrounding hypodensity, raising suspicion for hemorrhagic contusions.    < end of copied text >      Care Discussed with Consultants/Other Providers:    Patient is a 66y old  Female who presents with a chief complaint of SAH, ADH, and suboccipital skull fracture after mechanical fall (25 Oct 2021 12:56)      24 HOUR EVENTS:  No overnight events reported.     SUBJECTIVE:  Patient seen and examined at bedside.   She complains of vertigo. It has been present since she sustained the head injury.     ALLERGIES:  No Known Allergies    MEDICATIONS  (STANDING):  bisacodyl 5 milliGRAM(s) Oral every 12 hours  buPROPion XL (24-Hour) . 150 milliGRAM(s) Oral daily  enoxaparin Injectable 40 milliGRAM(s) SubCutaneous <User Schedule>  levETIRAcetam 500 milliGRAM(s) Oral every 12 hours  multivitamin 1 Tablet(s) Oral daily  pantoprazole    Tablet 40 milliGRAM(s) Oral before breakfast  polyethylene glycol 3350 17 Gram(s) Oral every 12 hours  senna 2 Tablet(s) Oral at bedtime  sodium chloride 0.65% Nasal 1 Spray(s) Both Nostrils three times a day  venlafaxine XR. 150 milliGRAM(s) Oral daily    MEDICATIONS  (PRN):  acetaminophen     Tablet .. 650 milliGRAM(s) Oral every 6 hours PRN Temp greater or equal to 38C (100.4F), Mild Pain (1 - 3)  meclizine 12.5 milliGRAM(s) Oral three times a day PRN Dizziness  melatonin 3 milliGRAM(s) Oral at bedtime PRN Insomnia  ondansetron   Disintegrating Tablet 4 milliGRAM(s) Oral every 6 hours PRN Nausea and/or Vomiting  oxyCODONE    IR 5 milliGRAM(s) Oral every 4 hours PRN Moderate Pain (4 - 6)  oxyCODONE    IR 10 milliGRAM(s) Oral every 4 hours PRN Severe Pain (7 - 10)    Vital Signs Last 24 Hrs  T(F): 97.6 (25 Oct 2021 08:26), Max: 98.9 (24 Oct 2021 20:30)  HR: 74 (25 Oct 2021 08:26) (74 - 83)  BP: 107/65 (25 Oct 2021 08:26) (92/64 - 119/70)  RR: 16 (25 Oct 2021 08:26) (15 - 16)  SpO2: 99% (25 Oct 2021 08:26) (98% - 99%)  I&O's Summary    PHYSICAL EXAM:  General: NAD, A/O x 3  ENT: Moist mucous membranes, no thrush  Neck: Supple, No JVD  Lungs: Clear to auscultation bilaterally, good air entry, non-labored breathing  Cardio: RRR, S1/S2, No murmur  Abdomen: Soft, Nontender, Nondistended; Bowel sounds present  Extremities: No calf tenderness, No pitting edema    LABS:                        14.1   7.82  )-----------( 252      ( 25 Oct 2021 07:39 )             41.2     10-25    128  |  94  |  26  ----------------------------<  90  4.0   |  25  |  1.19    Ca    9.1      25 Oct 2021 07:39    TPro  7.0  /  Alb  3.1  /  TBili  0.7  /  DBili  x   /  AST  26  /  ALT  25  /  AlkPhos  99  10-25        eGFR if Non African American: 47 mL/min/1.73M2 (10-25-21 @ 07:39)  eGFR if : 54 mL/min/1.73M2 (10-25-21 @ 07:39)            COVID-19 PCR: NotDetec (10-23-21 @ 19:49)  COVID-19 PCR: NotDetec (10-21-21 @ 06:51)  COVID-19 PCR: NotDetec (10-19-21 @ 06:28)  COVID-19 PCR: NotDetec (10-17-21 @ 18:25)    RADIOLOGY & ADDITIONAL TESTS:  < from: CT Head No Cont (10.20.21 @ 09:00) >  IMPRESSION:  1.  Similar-appearing bifrontal predominantly hypodense subdural hematomas.  2.  Evaluation of the bilateral anterior frontal lobes is limited due to artifact. Within this limitation, there is suggestion of foci of hyperdensity in the bilateral anterior inferior frontal lobes with surrounding hypodensity, raising suspicion for hemorrhagic contusions.    < end of copied text >      Care Discussed with Consultants/Other Providers:   Dr. Nguyen, PMR Patient is a 66y old  Female who presents with a chief complaint of SAH, ADH, and suboccipital skull fracture after mechanical fall (25 Oct 2021 12:56)      24 HOUR EVENTS:  No overnight events reported.     SUBJECTIVE:  Patient seen and examined at bedside.   She complains of vertigo. It has been present since she sustained the head injury.     ALLERGIES:  No Known Allergies    MEDICATIONS  (STANDING):  bisacodyl 5 milliGRAM(s) Oral every 12 hours  buPROPion XL (24-Hour) . 150 milliGRAM(s) Oral daily  enoxaparin Injectable 40 milliGRAM(s) SubCutaneous <User Schedule>  levETIRAcetam 500 milliGRAM(s) Oral every 12 hours  multivitamin 1 Tablet(s) Oral daily  pantoprazole    Tablet 40 milliGRAM(s) Oral before breakfast  polyethylene glycol 3350 17 Gram(s) Oral every 12 hours  senna 2 Tablet(s) Oral at bedtime  sodium chloride 0.65% Nasal 1 Spray(s) Both Nostrils three times a day  venlafaxine XR. 150 milliGRAM(s) Oral daily    MEDICATIONS  (PRN):  acetaminophen     Tablet .. 650 milliGRAM(s) Oral every 6 hours PRN Temp greater or equal to 38C (100.4F), Mild Pain (1 - 3)  meclizine 12.5 milliGRAM(s) Oral three times a day PRN Dizziness  melatonin 3 milliGRAM(s) Oral at bedtime PRN Insomnia  ondansetron   Disintegrating Tablet 4 milliGRAM(s) Oral every 6 hours PRN Nausea and/or Vomiting  oxyCODONE    IR 5 milliGRAM(s) Oral every 4 hours PRN Moderate Pain (4 - 6)  oxyCODONE    IR 10 milliGRAM(s) Oral every 4 hours PRN Severe Pain (7 - 10)    Vital Signs Last 24 Hrs  T(F): 97.6 (25 Oct 2021 08:26), Max: 98.9 (24 Oct 2021 20:30)  HR: 74 (25 Oct 2021 08:26) (74 - 83)  BP: 107/65 (25 Oct 2021 08:26) (92/64 - 119/70)  RR: 16 (25 Oct 2021 08:26) (15 - 16)  SpO2: 99% (25 Oct 2021 08:26) (98% - 99%)  I&O's Summary    PHYSICAL EXAM:  General: NAD, A/O x 3  ENT: Moist mucous membranes, no thrush  Neck: Supple, No JVD  Lungs: Clear to auscultation bilaterally, good air entry, non-labored breathing  Cardio: RRR, S1/S2, No murmur  Abdomen: Soft, Nontender, Nondistended; Bowel sounds present  Extremities: No calf tenderness, No pitting edema    LABS:                        14.1   7.82  )-----------( 252      ( 25 Oct 2021 07:39 )             41.2     10-25    128  |  94  |  26  ----------------------------<  90  4.0   |  25  |  1.19    Ca    9.1      25 Oct 2021 07:39    TPro  7.0  /  Alb  3.1  /  TBili  0.7  /  DBili  x   /  AST  26  /  ALT  25  /  AlkPhos  99  10-25        eGFR if Non African American: 47 mL/min/1.73M2 (10-25-21 @ 07:39)  eGFR if : 54 mL/min/1.73M2 (10-25-21 @ 07:39)            COVID-19 PCR: NotDetec (10-23-21 @ 19:49)  COVID-19 PCR: NotDetec (10-21-21 @ 06:51)  COVID-19 PCR: NotDetec (10-19-21 @ 06:28)  COVID-19 PCR: NotDetec (10-17-21 @ 18:25)    RADIOLOGY & ADDITIONAL TESTS:  < from: CT Head No Cont (10.20.21 @ 09:00) >  IMPRESSION:  1.  Similar-appearing bifrontal predominantly hypodense subdural hematomas.  2.  Evaluation of the bilateral anterior frontal lobes is limited due to artifact. Within this limitation, there is suggestion of foci of hyperdensity in the bilateral anterior inferior frontal lobes with surrounding hypodensity, raising suspicion for hemorrhagic contusions.    < end of copied text >    CT head reviewed by me personally w/o any obvious brain masses.    Care Discussed with Consultants/Other Providers:   Dr. Nguyen, PMR

## 2021-10-25 NOTE — PROGRESS NOTE ADULT - ASSESSMENT
66y presented to [facility] 10/17/2021 after mechanical fall, found to have R frontal tSAH, bifrontal sdh, and Lt suboccipital skull fracture. No neurosurgical intervention. Hospital course complicated by vertigo likely BPPV after trauma.      TBI with LOC of unclear duration : B/L frontal SDH/SAH and Calvarial fx  - Start comprehensive rehab program, PT/OT/SLP 3 hours a day, 5 days a week.  On Keppra for seizure ppx- ? duration , no documented seizures   - Precautions: falls, skull fracture, seizure       #Vertigo  - Likely BPPV S/P trauma  - Continue Meclizine PRN  - Declined Epley maneuver due to back issues per ENT at [facility], - Vestibular therapy  - F/u neuro and ENT outpatient    Labs with hyponatremia: w/u ordered. ? SIADH in setting of TBI     #Depression:- Continue Effexor XR 150mg daily and - Continue Wellbutrin  mg daily( dose has been reduced- states that she was on 450 mg )     #Unilateral kidney- watch creat     #Sleep: melatonin prn   -  GI/Bowel:- Senna QHS, Miralax PRN Daily    /Bladder: toilet schedule prn     DVT ppx:- Lovenox  - SCDs    hospitalist consult noted  66y presented to [facility] 10/17/2021 after mechanical fall, found to have R frontal tSAH, bifrontal sdh, and Lt suboccipital skull fracture. No neurosurgical intervention. Hospital course complicated by vertigo likely BPPV after trauma.      TBI with LOC of unclear duration : B/L frontal SDH/SAH and Calvarial fx  - Start comprehensive rehab program, PT/OT/SLP 3 hours a day, 5 days a week.  On Keppra for seizure ppx- ? duration , no documented seizures   - Precautions: falls, skull fracture, seizure       #Vertigo  - Likely BPPV S/P trauma  - Continue Meclizine PRN  - Declined Epley maneuver due to back issues per ENT at [facility], - Vestibular therapy  - F/u neuro and ENT outpatient    Labs with hyponatremia: w/u ordered. ? SIADH in setting of TBI . Fluid retsriction for 1 day ( in setting of unilateral kidney ) - 1500ml , BMP in am     #Depression:- Continue Effexor XR 150mg daily and - Continue Wellbutrin  mg daily( dose has been reduced- states that she was on 450 mg )     #Unilateral kidney- watch creat     #Sleep: melatonin prn   -  GI/Bowel:- Senna QHS, Miralax PRN Daily    /Bladder: toilet schedule prn     DVT ppx:- Lovenox  - SCDs    hospitalist consult noted

## 2021-10-25 NOTE — DIETITIAN INITIAL EVALUATION ADULT. - OTHER INFO
Initial Nutrition Assessment   66yr Old Female   Dx: SAH   Diet - Regular Diet w/ Thin Liquids   Denies Food Allergy/Intolerance  Tolerates Diet Well - No Chewing/Swallowing Complications (Per Patient)  Consumed % of 1st Meals (as Per Documentation)  No Pressure Ulcers (as Per Nursing Flow Sheets)  No Edema Noted (as Per Nursing Flow Sheets)  No Recent Nausea/Vomiting/Diarrhea & Some Recent Constipation (as Per Patient)

## 2021-10-26 LAB
ANION GAP SERPL CALC-SCNC: 8 MMOL/L — SIGNIFICANT CHANGE UP (ref 5–17)
BUN SERPL-MCNC: 24 MG/DL — HIGH (ref 7–23)
CALCIUM SERPL-MCNC: 8.9 MG/DL — SIGNIFICANT CHANGE UP (ref 8.4–10.5)
CHLORIDE SERPL-SCNC: 96 MMOL/L — SIGNIFICANT CHANGE UP (ref 96–108)
CO2 SERPL-SCNC: 26 MMOL/L — SIGNIFICANT CHANGE UP (ref 22–31)
CREAT SERPL-MCNC: 1.15 MG/DL — SIGNIFICANT CHANGE UP (ref 0.5–1.3)
GLUCOSE SERPL-MCNC: 91 MG/DL — SIGNIFICANT CHANGE UP (ref 70–99)
OSMOLALITY SERPL: 267 MOSMOL/KG — LOW (ref 280–301)
OSMOLALITY UR: 532 MOSM/KG — SIGNIFICANT CHANGE UP (ref 50–1200)
POTASSIUM SERPL-MCNC: 4 MMOL/L — SIGNIFICANT CHANGE UP (ref 3.5–5.3)
POTASSIUM SERPL-SCNC: 4 MMOL/L — SIGNIFICANT CHANGE UP (ref 3.5–5.3)
SODIUM SERPL-SCNC: 130 MMOL/L — LOW (ref 135–145)
SODIUM UR-SCNC: 43 MMOL/L — SIGNIFICANT CHANGE UP
TSH SERPL-MCNC: 2.47 UIU/ML — SIGNIFICANT CHANGE UP (ref 0.36–3.74)
URATE SERPL-MCNC: 3.7 MG/DL — SIGNIFICANT CHANGE UP (ref 2.5–7)

## 2021-10-26 PROCEDURE — 99232 SBSQ HOSP IP/OBS MODERATE 35: CPT

## 2021-10-26 RX ADMIN — POLYETHYLENE GLYCOL 3350 17 GRAM(S): 17 POWDER, FOR SOLUTION ORAL at 06:27

## 2021-10-26 RX ADMIN — LEVETIRACETAM 500 MILLIGRAM(S): 250 TABLET, FILM COATED ORAL at 17:33

## 2021-10-26 RX ADMIN — Medication 1 SPRAY(S): at 21:10

## 2021-10-26 RX ADMIN — Medication 1 TABLET(S): at 12:23

## 2021-10-26 RX ADMIN — Medication 650 MILLIGRAM(S): at 13:00

## 2021-10-26 RX ADMIN — BUPROPION HYDROCHLORIDE 150 MILLIGRAM(S): 150 TABLET, EXTENDED RELEASE ORAL at 12:23

## 2021-10-26 RX ADMIN — Medication 650 MILLIGRAM(S): at 22:13

## 2021-10-26 RX ADMIN — Medication 650 MILLIGRAM(S): at 21:13

## 2021-10-26 RX ADMIN — Medication 650 MILLIGRAM(S): at 12:23

## 2021-10-26 RX ADMIN — POLYETHYLENE GLYCOL 3350 17 GRAM(S): 17 POWDER, FOR SOLUTION ORAL at 17:33

## 2021-10-26 RX ADMIN — PANTOPRAZOLE SODIUM 40 MILLIGRAM(S): 20 TABLET, DELAYED RELEASE ORAL at 06:27

## 2021-10-26 RX ADMIN — Medication 1 SPRAY(S): at 14:01

## 2021-10-26 RX ADMIN — Medication 1 SPRAY(S): at 06:29

## 2021-10-26 RX ADMIN — LEVETIRACETAM 500 MILLIGRAM(S): 250 TABLET, FILM COATED ORAL at 06:27

## 2021-10-26 RX ADMIN — Medication 150 MILLIGRAM(S): at 12:23

## 2021-10-26 RX ADMIN — Medication 5 MILLIGRAM(S): at 17:36

## 2021-10-26 RX ADMIN — Medication 5 MILLIGRAM(S): at 06:27

## 2021-10-26 RX ADMIN — ENOXAPARIN SODIUM 40 MILLIGRAM(S): 100 INJECTION SUBCUTANEOUS at 17:34

## 2021-10-26 NOTE — PROGRESS NOTE ADULT - SUBJECTIVE AND OBJECTIVE BOX
Patient is a 66y old  Female who presents with a chief complaint of SAH, ADH, and suboccipital skull fracture after mechanical fall (24 Oct 2021 12:52)  HPI:  A 66 year old female presented with headache and nausea s/p mechanical fall down stairs. Patient took last ASA 3 days ago. ARU upon admission was 632. Patient was admitted to neurosurgery on 10/17/2021 for R frontal tSAH, bifrontal sdh, and Lt suboccipital skull fracture. 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 Serial CT scans of head were performed during this admission with the last stable one on 10/20. Hospitalist consult was appreciated. Patient was advised to have RASHEEDA stocking and hydration for orthostatic hypotension. Psychiatry consult was appreciated and adjusted her anti-depressant giving her dizziness/vertigo after fall. Neurology and ENT consults were also appreciated for vertigo. Patient was started on meclizine as needed for dizziness and recommended with vestibular rehab.     Patient was medically stable for discharge to  for AC 10/23/21. (23 Oct 2021 15:00)      TODAY'S SUBJECTIVE & REVIEW OF SYMPTOMS  Patient seen at bedside  states that she feels much better and vertigo improved.  Would like to be evaluated further for her symptoms leading to falls    Denies CP/palpitations/abdominal pain or nausea    Reports 2 falls on day of admission - 2nd with LOC of unclear duration. Had vertigo for 2 weeks prior . States that her antidepressant meds were adjusted recently         PHYSICAL EXAM    Vital Signs Last 24 Hrs  T(C): 36.9 (26 Oct 2021 08:12), Max: 36.9 (26 Oct 2021 08:12)  T(F): 98.5 (26 Oct 2021 08:12), Max: 98.5 (26 Oct 2021 08:12)  HR: 78 (26 Oct 2021 08:12) (74 - 78)  BP: 105/67 (26 Oct 2021 08:12) (99/60 - 105/67)  BP(mean): --  RR: 16 (26 Oct 2021 08:12) (16 - 16)  SpO2: 97% (26 Oct 2021 08:12) (97% - 98%)    Constitutional - NAD, Comfortable  Chest - CTAB  Cardiovascular - S1S2  Abdomen - BS+, Soft, NTND  Extremities - No C/C/E, No calf tenderness   Neurologic Exam -                    Cognitive - Awake, Alert, AAO to self, place, date, year, situation     Communication - Fluent, No dysarthria     Motor - No focal deficits                    LEFT    UE - ShAB 5/5, EF 5/5, EE 5/5, WE 5/5,  5/5                    RIGHT UE - ShAB 5/5, EF 5/5, EE 5/5, WE 5/5,  5/5                    LEFT    LE - HF 5/5, KE 5/5, DF 5/5, PF 5/5                    RIGHT LE - HF 5/5, KE 5/5, DF 5/5, PF 5/5        Sensory - Intact to LT   Psychiatric - Mood stable, Affect WNL      MEDICATIONS  (STANDING):  bisacodyl 5 milliGRAM(s) Oral every 12 hours  buPROPion XL (24-Hour) . 150 milliGRAM(s) Oral daily  enoxaparin Injectable 40 milliGRAM(s) SubCutaneous <User Schedule>  levETIRAcetam 500 milliGRAM(s) Oral every 12 hours  multivitamin 1 Tablet(s) Oral daily  pantoprazole    Tablet 40 milliGRAM(s) Oral before breakfast  polyethylene glycol 3350 17 Gram(s) Oral every 12 hours  senna 2 Tablet(s) Oral at bedtime  sodium chloride 0.65% Nasal 1 Spray(s) Both Nostrils three times a day  venlafaxine XR. 150 milliGRAM(s) Oral daily    MEDICATIONS  (PRN):  acetaminophen     Tablet .. 650 milliGRAM(s) Oral every 6 hours PRN Temp greater or equal to 38C (100.4F), Mild Pain (1 - 3)  meclizine 12.5 milliGRAM(s) Oral three times a day PRN Dizziness  melatonin 3 milliGRAM(s) Oral at bedtime PRN Insomnia  ondansetron   Disintegrating Tablet 4 milliGRAM(s) Oral every 6 hours PRN Nausea and/or Vomiting  oxyCODONE    IR 5 milliGRAM(s) Oral every 4 hours PRN Moderate Pain (4 - 6)  oxyCODONE    IR 10 milliGRAM(s) Oral every 4 hours PRN Severe Pain (7 - 10)                            14.1   7.82  )-----------( 252      ( 25 Oct 2021 07:39 )             41.2     10-26    130<L>  |  96  |  24<H>  ----------------------------<  91  4.0   |  26  |  1.15    Ca    8.9      26 Oct 2021 05:30    TPro  7.0  /  Alb  3.1<L>  /  TBili  0.7  /  DBili  x   /  AST  26  /  ALT  25  /  AlkPhos  99  10-25    Osmolality, Random Urine (10.25.21 @ 12:31)    Osmolality, Random Urine: 532 mosm/Kg    Uric Acid, Serum (10.26.21 @ 05:30)    Uric Acid, Serum: 3.7 mg/dL      Osmolality, Serum (10.26.21 @ 05:30)    Osmolality, Serum: 267 mosmol/kg    Urine spot sodium pending

## 2021-10-26 NOTE — PROGRESS NOTE ADULT - SUBJECTIVE AND OBJECTIVE BOX
Patient is a 66y old  Female who presents with a chief complaint of SAH, ADH, and suboccipital skull fracture after mechanical fall (26 Oct 2021 13:41)      HPI:  A 66 year old female presented with headache and nausea s/p mechanical fall down stairs. Patient took last ASA 3 days ago. ARU upon admission was 632. Patient was admitted to neurosurgery on 10/17/2021 for R frontal tSAH, bifrontal sdh, and Lt suboccipital skull fracture. 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 Serial CT scans of head were performed during this admission with the last stable one on 10/20. Hospitalist consult was appreciated. Patient was advised to have RASHEEDA stocking and hydration for orthostatic hypotension. Psychiatry consult was appreciated and adjusted her anti-depressant giving her dizziness/vertigo after fall. Neurology and ENT consults were also appreciated for vertigo. Patient was started on meclizine as needed for dizziness and recommended with vestibular rehab.     Patient was medically stable for discharge to  for AC 10/23/21. (23 Oct 2021 15:00)        SUBJECTIVE: Patient seen and examined. No acute overnight events, slept well. Notes her vertigo was more tolerable today and is able to work with therapist better today.       REVIEW OF SYSTEMS  Denies fevers/chills  Denies headache/dizziness  Denies chest pain/SOB  Denies abdominal pain/nausea/vomiting/diarrhea/constipation  + pain RLE (near bruising) and bilateral buttocks     VITALS  66y  Vital Signs Last 24 Hrs  T(C): 36.9 (26 Oct 2021 08:12), Max: 36.9 (26 Oct 2021 08:12)  T(F): 98.5 (26 Oct 2021 08:12), Max: 98.5 (26 Oct 2021 08:12)  HR: 78 (26 Oct 2021 08:12) (74 - 78)  BP: 105/67 (26 Oct 2021 08:12) (99/60 - 105/67)  BP(mean): --  RR: 16 (26 Oct 2021 08:12) (16 - 16)  SpO2: 97% (26 Oct 2021 08:12) (97% - 98%)  Daily     Daily         PHYSICAL EXAM:     Constitutional - NAD, Comfortable  Chest - CTAB  Cardiovascular - S1S2  Abdomen - BS+, Soft, NTND  Extremities - No C/C/E, No calf tenderness   Neurologic Exam -                    Cognitive - Awake, Alert, AAO to self, place, date, year, situation     Communication - Fluent, No dysarthria     Motor - No focal deficits, 5/5 throughout     Sensory - Intact to LT   Psychiatric - Mood stable, Affect WNL    RECENT LABS:                        14.1   7.82  )-----------( 252      ( 25 Oct 2021 07:39 )             41.2     10-26    130<L>  |  96  |  24<H>  ----------------------------<  91  4.0   |  26  |  1.15    Ca    8.9      26 Oct 2021 05:30    TPro  7.0  /  Alb  3.1<L>  /  TBili  0.7  /  DBili  x   /  AST  26  /  ALT  25  /  AlkPhos  99  10-25    LIVER FUNCTIONS - ( 25 Oct 2021 07:39 )  Alb: 3.1 g/dL / Pro: 7.0 g/dL / ALK PHOS: 99 U/L / ALT: 25 U/L / AST: 26 U/L / GGT: x                   CAPILLARY BLOOD GLUCOSE            MEDICATIONS:  MEDICATIONS  (STANDING):  bisacodyl 5 milliGRAM(s) Oral every 12 hours  buPROPion XL (24-Hour) . 150 milliGRAM(s) Oral daily  enoxaparin Injectable 40 milliGRAM(s) SubCutaneous <User Schedule>  levETIRAcetam 500 milliGRAM(s) Oral every 12 hours  multivitamin 1 Tablet(s) Oral daily  pantoprazole    Tablet 40 milliGRAM(s) Oral before breakfast  polyethylene glycol 3350 17 Gram(s) Oral every 12 hours  senna 2 Tablet(s) Oral at bedtime  sodium chloride 0.65% Nasal 1 Spray(s) Both Nostrils three times a day  venlafaxine XR. 150 milliGRAM(s) Oral daily    MEDICATIONS  (PRN):  acetaminophen     Tablet .. 650 milliGRAM(s) Oral every 6 hours PRN Temp greater or equal to 38C (100.4F), Mild Pain (1 - 3)  meclizine 12.5 milliGRAM(s) Oral three times a day PRN Dizziness  melatonin 3 milliGRAM(s) Oral at bedtime PRN Insomnia  ondansetron   Disintegrating Tablet 4 milliGRAM(s) Oral every 6 hours PRN Nausea and/or Vomiting  oxyCODONE    IR 5 milliGRAM(s) Oral every 4 hours PRN Moderate Pain (4 - 6)  oxyCODONE    IR 10 milliGRAM(s) Oral every 4 hours PRN Severe Pain (7 - 10)

## 2021-10-26 NOTE — PROGRESS NOTE ADULT - ASSESSMENT
66y presented to [facility] 10/17/2021 after mechanical fall, found to have R frontal tSAH, bifrontal sdh, and Lt suboccipital skull fracture. No neurosurgical intervention. Hospital course complicated by vertigo likely BPPV after trauma.      TBI with LOC of unclear duration : B/L frontal SDH/SAH and Calvarial fx  - Continue comprehensive rehab program, PT/OT/SLP 3 hours a day, 5 days a week.  On Keppra for seizure ppx- ? duration , no documented seizures   - Precautions: falls, skull fracture, seizure       #Vertigo  - Likely BPPV S/P trauma  - Continue Meclizine PRN  - Declined Epley maneuver due to back issues per ENT at [facility], - Vestibular therapy  - F/u neuro and ENT outpatient    Labs with hyponatremia: w/u ordered. ? SIADH in setting of TBI . Fluid retsriction for 1.5 liters/24 hrs, Sodium improved today  BMP in am     #Depression:- Continue Effexor XR 150mg daily and - Continue Wellbutrin  mg daily( dose has been reduced- states that she was on 450 mg )     #Unilateral kidney- watch creat     #Sleep: melatonin prn   -  GI/Bowel:- Senna QHS, Miralax PRN Daily    /Bladder: toilet schedule prn     DVT ppx:- Lovenox  - SCDs    hospitalist consult noted

## 2021-10-26 NOTE — PROGRESS NOTE ADULT - ASSESSMENT
67 yo F who was found to have a right frontal traumatic SAH, bifrontal SDH, and left suboccipital skull fracture following a mechanical fall down stairs.     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  -  TSH wnl. uric acid wnl.   - Pending: serum osm, urine lytes.   - agree w/ fluid restriction.   - Consideration for medication side effect (wellbutrin and venlafaxine), though less likely as she has been on these meds chronically.     # vertigo  - seen by Neurology during inpatient stay  - meclizine  - outpatient vestibular rehab.      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   - carefully monitor kidney function    # depression  - seen by psychiatry  - pt on wellbutrin and venlafaxine    dvt ppx: lovenox

## 2021-10-26 NOTE — PROGRESS NOTE ADULT - ASSESSMENT
66y presented to [facility] 10/17/2021 after mechanical fall, found to have R frontal tSAH, bifrontal sdh, and Lt suboccipital skull fracture. No neurosurgical intervention. Hospital course complicated by vertigo likely BPPV after trauma.      TBI with LOC of unclear duration : B/L frontal SDH/SAH and Calvarial fx  - Start comprehensive rehab program, PT/OT/SLP 3 hours a day, 5 days a week.  On Keppra for seizure ppx- ? duration , no documented seizures   - Precautions: falls, skull fracture, seizure       #Vertigo  - Likely BPPV S/P trauma  - Continue Meclizine PRN  - Declined Epley maneuver due to back issues per ENT at [facility], - Vestibular therapy  - F/u neuro and ENT outpatient    Moderate hyponatremia: possibly 2/2 SIADH in setting of TBI . Fluid restriction 1500ml on 10/25, Na 130 from 128. workup pending. Monitor Cr closely w/ fluid restriction, currently stable (1.15 Cr).    #Depression:- Continue Effexor XR 150mg daily and - Continue Wellbutrin  mg daily( dose has been reduced- states that she was on 450 mg )     #Unilateral kidney- monitor output and Cr.    #Sleep: melatonin prn   -  GI/Bowel:- Senna QHS, Miralax PRN Daily    /Bladder: toilet schedule prn     DVT ppx:- Lovenox  - SCDs

## 2021-10-26 NOTE — PROGRESS NOTE ADULT - SUBJECTIVE AND OBJECTIVE BOX
Patient is a 66y old  Female who presents with a chief complaint of SAH, ADH, and suboccipital skull fracture after mechanical fall (25 Oct 2021 14:59)      24 HOUR EVENTS:  No overnight events reported.     SUBJECTIVE:  Patient seen and examined at bedside.   she feels that the vertigo may be better today.  Does not suffer from hyponatremia chronically - new since she sustained this injury.     ALLERGIES:  No Known Allergies    MEDICATIONS  (STANDING):  bisacodyl 5 milliGRAM(s) Oral every 12 hours  buPROPion XL (24-Hour) . 150 milliGRAM(s) Oral daily  enoxaparin Injectable 40 milliGRAM(s) SubCutaneous <User Schedule>  levETIRAcetam 500 milliGRAM(s) Oral every 12 hours  multivitamin 1 Tablet(s) Oral daily  pantoprazole    Tablet 40 milliGRAM(s) Oral before breakfast  polyethylene glycol 3350 17 Gram(s) Oral every 12 hours  senna 2 Tablet(s) Oral at bedtime  sodium chloride 0.65% Nasal 1 Spray(s) Both Nostrils three times a day  venlafaxine XR. 150 milliGRAM(s) Oral daily    MEDICATIONS  (PRN):  acetaminophen     Tablet .. 650 milliGRAM(s) Oral every 6 hours PRN Temp greater or equal to 38C (100.4F), Mild Pain (1 - 3)  meclizine 12.5 milliGRAM(s) Oral three times a day PRN Dizziness  melatonin 3 milliGRAM(s) Oral at bedtime PRN Insomnia  ondansetron   Disintegrating Tablet 4 milliGRAM(s) Oral every 6 hours PRN Nausea and/or Vomiting  oxyCODONE    IR 5 milliGRAM(s) Oral every 4 hours PRN Moderate Pain (4 - 6)  oxyCODONE    IR 10 milliGRAM(s) Oral every 4 hours PRN Severe Pain (7 - 10)    Vital Signs Last 24 Hrs  T(F): 98.5 (26 Oct 2021 08:12), Max: 98.5 (26 Oct 2021 08:12)  HR: 78 (26 Oct 2021 08:12) (74 - 78)  BP: 105/67 (26 Oct 2021 08:12) (99/60 - 105/67)  RR: 16 (26 Oct 2021 08:12) (16 - 16)  SpO2: 97% (26 Oct 2021 08:12) (97% - 98%)  I&O's Summary    PHYSICAL EXAM:  General: NAD, A/O x 3  ENT: Moist mucous membranes, no thrush  Neck: Supple, No JVD  Lungs: Clear to auscultation bilaterally, good air entry, non-labored breathing  Cardio: RRR, S1/S2, No murmur  Abdomen: Soft, Nontender, Nondistended; Bowel sounds present  Extremities: No calf tenderness, No pitting edema    LABS:                        14.1   7.82  )-----------( 252      ( 25 Oct 2021 07:39 )             41.2     10-26    130  |  96  |  24  ----------------------------<  91  4.0   |  26  |  1.15    Ca    8.9      26 Oct 2021 05:30    TPro  7.0  /  Alb  3.1  /  TBili  0.7  /  DBili  x   /  AST  26  /  ALT  25  /  AlkPhos  99  10-25        eGFR if Non African American: 49 mL/min/1.73M2 (10-26-21 @ 05:30)  eGFR if African American: 57 mL/min/1.73M2 (10-26-21 @ 05:30)                TSH 2.470   TSH with FT4 reflex --  Total T3 --                      COVID-19 PCR: NotDetec (10-23-21 @ 19:49)  COVID-19 PCR: NotDetec (10-21-21 @ 06:51)  COVID-19 PCR: NotDetec (10-19-21 @ 06:28)  COVID-19 PCR: NotDetec (10-17-21 @ 18:25)    RADIOLOGY & ADDITIONAL TESTS:    Care Discussed with Consultants/Other Providers:

## 2021-10-27 LAB
ANION GAP SERPL CALC-SCNC: 6 MMOL/L — SIGNIFICANT CHANGE UP (ref 5–17)
BUN SERPL-MCNC: 22 MG/DL — SIGNIFICANT CHANGE UP (ref 7–23)
CALCIUM SERPL-MCNC: 8.7 MG/DL — SIGNIFICANT CHANGE UP (ref 8.4–10.5)
CHLORIDE SERPL-SCNC: 98 MMOL/L — SIGNIFICANT CHANGE UP (ref 96–108)
CO2 SERPL-SCNC: 28 MMOL/L — SIGNIFICANT CHANGE UP (ref 22–31)
CREAT SERPL-MCNC: 1.14 MG/DL — SIGNIFICANT CHANGE UP (ref 0.5–1.3)
GLUCOSE SERPL-MCNC: 97 MG/DL — SIGNIFICANT CHANGE UP (ref 70–99)
POTASSIUM SERPL-MCNC: 4 MMOL/L — SIGNIFICANT CHANGE UP (ref 3.5–5.3)
POTASSIUM SERPL-SCNC: 4 MMOL/L — SIGNIFICANT CHANGE UP (ref 3.5–5.3)
SODIUM SERPL-SCNC: 132 MMOL/L — LOW (ref 135–145)

## 2021-10-27 PROCEDURE — 99233 SBSQ HOSP IP/OBS HIGH 50: CPT

## 2021-10-27 PROCEDURE — 99232 SBSQ HOSP IP/OBS MODERATE 35: CPT

## 2021-10-27 RX ADMIN — Medication 150 MILLIGRAM(S): at 12:14

## 2021-10-27 RX ADMIN — ENOXAPARIN SODIUM 40 MILLIGRAM(S): 100 INJECTION SUBCUTANEOUS at 17:39

## 2021-10-27 RX ADMIN — POLYETHYLENE GLYCOL 3350 17 GRAM(S): 17 POWDER, FOR SOLUTION ORAL at 17:39

## 2021-10-27 RX ADMIN — Medication 1 TABLET(S): at 12:14

## 2021-10-27 RX ADMIN — LEVETIRACETAM 500 MILLIGRAM(S): 250 TABLET, FILM COATED ORAL at 17:40

## 2021-10-27 RX ADMIN — Medication 650 MILLIGRAM(S): at 06:16

## 2021-10-27 RX ADMIN — BUPROPION HYDROCHLORIDE 150 MILLIGRAM(S): 150 TABLET, EXTENDED RELEASE ORAL at 12:14

## 2021-10-27 RX ADMIN — PANTOPRAZOLE SODIUM 40 MILLIGRAM(S): 20 TABLET, DELAYED RELEASE ORAL at 06:12

## 2021-10-27 RX ADMIN — Medication 1 SPRAY(S): at 21:21

## 2021-10-27 RX ADMIN — POLYETHYLENE GLYCOL 3350 17 GRAM(S): 17 POWDER, FOR SOLUTION ORAL at 06:12

## 2021-10-27 RX ADMIN — Medication 5 MILLIGRAM(S): at 17:39

## 2021-10-27 RX ADMIN — LEVETIRACETAM 500 MILLIGRAM(S): 250 TABLET, FILM COATED ORAL at 06:12

## 2021-10-27 RX ADMIN — Medication 650 MILLIGRAM(S): at 07:22

## 2021-10-27 RX ADMIN — Medication 1 SPRAY(S): at 15:21

## 2021-10-27 RX ADMIN — Medication 1 SPRAY(S): at 06:13

## 2021-10-27 RX ADMIN — Medication 650 MILLIGRAM(S): at 13:47

## 2021-10-27 RX ADMIN — Medication 650 MILLIGRAM(S): at 12:16

## 2021-10-27 RX ADMIN — Medication 650 MILLIGRAM(S): at 18:54

## 2021-10-27 RX ADMIN — Medication 5 MILLIGRAM(S): at 06:12

## 2021-10-27 RX ADMIN — Medication 650 MILLIGRAM(S): at 19:38

## 2021-10-27 NOTE — PROGRESS NOTE ADULT - SUBJECTIVE AND OBJECTIVE BOX
Patient is a 66y old  Female who presents with a chief complaint of SAH, ADH, and suboccipital skull fracture after mechanical fall (26 Oct 2021 14:40)      24 HOUR EVENTS:  No overnight events reported.     SUBJECTIVE:  Patient seen and examined at bedside.     ALLERGIES:  No Known Allergies    MEDICATIONS  (STANDING):  bisacodyl 5 milliGRAM(s) Oral every 12 hours  buPROPion XL (24-Hour) . 150 milliGRAM(s) Oral daily  enoxaparin Injectable 40 milliGRAM(s) SubCutaneous <User Schedule>  levETIRAcetam 500 milliGRAM(s) Oral every 12 hours  multivitamin 1 Tablet(s) Oral daily  pantoprazole    Tablet 40 milliGRAM(s) Oral before breakfast  polyethylene glycol 3350 17 Gram(s) Oral every 12 hours  senna 2 Tablet(s) Oral at bedtime  sodium chloride 0.65% Nasal 1 Spray(s) Both Nostrils three times a day  venlafaxine XR. 150 milliGRAM(s) Oral daily    MEDICATIONS  (PRN):  acetaminophen     Tablet .. 650 milliGRAM(s) Oral every 6 hours PRN Temp greater or equal to 38C (100.4F), Mild Pain (1 - 3)  meclizine 12.5 milliGRAM(s) Oral three times a day PRN Dizziness  melatonin 3 milliGRAM(s) Oral at bedtime PRN Insomnia  ondansetron   Disintegrating Tablet 4 milliGRAM(s) Oral every 6 hours PRN Nausea and/or Vomiting  oxyCODONE    IR 5 milliGRAM(s) Oral every 4 hours PRN Moderate Pain (4 - 6)  oxyCODONE    IR 10 milliGRAM(s) Oral every 4 hours PRN Severe Pain (7 - 10)    Vital Signs Last 24 Hrs  T(F): 97.8 (26 Oct 2021 19:49), Max: 98.5 (26 Oct 2021 08:12)  HR: 71 (26 Oct 2021 19:49) (71 - 78)  BP: 105/65 (26 Oct 2021 19:49) (105/65 - 105/67)  RR: 16 (26 Oct 2021 19:49) (16 - 16)  SpO2: 100% (26 Oct 2021 19:49) (97% - 100%)  I&O's Summary    26 Oct 2021 07:01  -  27 Oct 2021 07:00  --------------------------------------------------------  IN: 1 mL / OUT: 450 mL / NET: -449 mL    27 Oct 2021 07:01  -  27 Oct 2021 07:47  --------------------------------------------------------  IN: 100 mL / OUT: 0 mL / NET: 100 mL      PHYSICAL EXAM:  General: NAD, A/O x 3  ENT: Moist mucous membranes, no thrush  Neck: Supple, No JVD  Lungs: Clear to auscultation bilaterally, good air entry, non-labored breathing  Cardio: RRR, S1/S2, No murmur  Abdomen: Soft, Nontender, Nondistended; Bowel sounds present  Extremities: No calf tenderness, No pitting edema    LABS:                        14.1   7.82  )-----------( 252      ( 25 Oct 2021 07:39 )             41.2     10-27    132  |  98  |  22  ----------------------------<  97  4.0   |  28  |  1.14    Ca    8.7      27 Oct 2021 05:12    TPro  7.0  /  Alb  3.1  /  TBili  0.7  /  DBili  x   /  AST  26  /  ALT  25  /  AlkPhos  99  10-25        eGFR if Non African American: 49 mL/min/1.73M2 (10-27-21 @ 05:12)  eGFR if African American: 57 mL/min/1.73M2 (10-27-21 @ 05:12)                TSH 2.470   TSH with FT4 reflex --  Total T3 --                      COVID-19 PCR: NotDetec (10-23-21 @ 19:49)  COVID-19 PCR: NotDetec (10-21-21 @ 06:51)  COVID-19 PCR: NotDetec (10-19-21 @ 06:28)  COVID-19 PCR: NotDetec (10-17-21 @ 18:25)    RADIOLOGY & ADDITIONAL TESTS:    Care Discussed with Consultants/Other Providers:    Patient is a 66y old  Female who presents with a chief complaint of SAH, ADH, and suboccipital skull fracture after mechanical fall (26 Oct 2021 14:40)      24 HOUR EVENTS:  No overnight events reported.     SUBJECTIVE:  Patient seen and examined at bedside.   She still has vertigo. complains of mild LE edema, which she reports as chronic - too afraid to take a diuretic d/t her solitary kidney.    ALLERGIES:  No Known Allergies    MEDICATIONS  (STANDING):  bisacodyl 5 milliGRAM(s) Oral every 12 hours  buPROPion XL (24-Hour) . 150 milliGRAM(s) Oral daily  enoxaparin Injectable 40 milliGRAM(s) SubCutaneous <User Schedule>  levETIRAcetam 500 milliGRAM(s) Oral every 12 hours  multivitamin 1 Tablet(s) Oral daily  pantoprazole    Tablet 40 milliGRAM(s) Oral before breakfast  polyethylene glycol 3350 17 Gram(s) Oral every 12 hours  senna 2 Tablet(s) Oral at bedtime  sodium chloride 0.65% Nasal 1 Spray(s) Both Nostrils three times a day  venlafaxine XR. 150 milliGRAM(s) Oral daily    MEDICATIONS  (PRN):  acetaminophen     Tablet .. 650 milliGRAM(s) Oral every 6 hours PRN Temp greater or equal to 38C (100.4F), Mild Pain (1 - 3)  meclizine 12.5 milliGRAM(s) Oral three times a day PRN Dizziness  melatonin 3 milliGRAM(s) Oral at bedtime PRN Insomnia  ondansetron   Disintegrating Tablet 4 milliGRAM(s) Oral every 6 hours PRN Nausea and/or Vomiting  oxyCODONE    IR 5 milliGRAM(s) Oral every 4 hours PRN Moderate Pain (4 - 6)  oxyCODONE    IR 10 milliGRAM(s) Oral every 4 hours PRN Severe Pain (7 - 10)    Vital Signs Last 24 Hrs  T(F): 97.8 (26 Oct 2021 19:49), Max: 98.5 (26 Oct 2021 08:12)  HR: 71 (26 Oct 2021 19:49) (71 - 78)  BP: 105/65 (26 Oct 2021 19:49) (105/65 - 105/67)  RR: 16 (26 Oct 2021 19:49) (16 - 16)  SpO2: 100% (26 Oct 2021 19:49) (97% - 100%)  I&O's Summary    26 Oct 2021 07:01  -  27 Oct 2021 07:00  --------------------------------------------------------  IN: 1 mL / OUT: 450 mL / NET: -449 mL    27 Oct 2021 07:01  -  27 Oct 2021 07:47  --------------------------------------------------------  IN: 100 mL / OUT: 0 mL / NET: 100 mL      PHYSICAL EXAM:  General: NAD, A/O x 3  ENT: Moist mucous membranes, no thrush  Neck: Supple, No JVD  Lungs: Clear to auscultation bilaterally, good air entry, non-labored breathing  Cardio: RRR, S1/S2, No murmur  Abdomen: Soft, Nontender, Nondistended; Bowel sounds present  Extremities: No calf tenderness, trace edema no contractures.     LABS:                        14.1   7.82  )-----------( 252      ( 25 Oct 2021 07:39 )             41.2     10-27    132  |  98  |  22  ----------------------------<  97  4.0   |  28  |  1.14    Ca    8.7      27 Oct 2021 05:12    TPro  7.0  /  Alb  3.1  /  TBili  0.7  /  DBili  x   /  AST  26  /  ALT  25  /  AlkPhos  99  10-25        eGFR if Non African American: 49 mL/min/1.73M2 (10-27-21 @ 05:12)  eGFR if African American: 57 mL/min/1.73M2 (10-27-21 @ 05:12)                TSH 2.470   TSH with FT4 reflex --  Total T3 --                      COVID-19 PCR: NotDetec (10-23-21 @ 19:49)  COVID-19 PCR: NotDetec (10-21-21 @ 06:51)  COVID-19 PCR: NotDetec (10-19-21 @ 06:28)  COVID-19 PCR: NotDetec (10-17-21 @ 18:25)    RADIOLOGY & ADDITIONAL TESTS:    Care Discussed with Consultants/Other Providers:

## 2021-10-27 NOTE — PROGRESS NOTE ADULT - SUBJECTIVE AND OBJECTIVE BOX
Patient is a 66y old  Female who presents with a chief complaint of SAH, ADH, and suboccipital skull fracture after mechanical fall (24 Oct 2021 12:52)  HPI:  A 66 year old female presented with headache and nausea s/p mechanical fall down stairs. Patient took last ASA 3 days ago. ARU upon admission was 632. Patient was admitted to neurosurgery on 10/17/2021 for R frontal tSAH, bifrontal sdh, and Lt suboccipital skull fracture. 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 Serial CT scans of head were performed during this admission with the last stable one on 10/20. Hospitalist consult was appreciated. Patient was advised to have RASHEEDA stocking and hydration for orthostatic hypotension. Psychiatry consult was appreciated and adjusted her anti-depressant giving her dizziness/vertigo after fall. Neurology and ENT consults were also appreciated for vertigo. Patient was started on meclizine as needed for dizziness and recommended with vestibular rehab.   Patient was medically stable for discharge to  for AC 10/23/21. (23 Oct 2021 15:00)      TODAY'S SUBJECTIVE & REVIEW OF SYMPTOMS  Patient seen at bedside  Reports that she feels well and much better  Has some buttock pain that is relieved with moist heat     Denies CP/palpitations/abdominal pain or nausea  + BM           PHYSICAL EXAM  Vital Signs Last 24 Hrs  T(C): 37.2 (27 Oct 2021 07:43), Max: 37.2 (27 Oct 2021 07:43)  T(F): 99 (27 Oct 2021 07:43), Max: 99 (27 Oct 2021 07:43)  HR: 64 (27 Oct 2021 07:43) (64 - 71)  BP: 105/70 (27 Oct 2021 07:43) (105/65 - 105/70)  BP(mean): --  RR: 16 (27 Oct 2021 07:43) (16 - 16)  SpO2: 100% (27 Oct 2021 07:43) (100% - 100%)      Constitutional - NAD, Comfortable  Chest - CTAB  Cardiovascular - S1S2  Abdomen - BS+, Soft, NTND  Extremities - No C/C/E, No calf tenderness , Ecchymosis - right calf, left side neck,  Neurologic Exam -                    Cognitive - Awake, Alert, AAO to self, place, date, year, situation     Communication - Fluent, No dysarthria     Motor - 5/5    Psychiatric - Mood stable, Affect WNL      MEDICATIONS  (STANDING):  bisacodyl 5 milliGRAM(s) Oral every 12 hours  buPROPion XL (24-Hour) . 150 milliGRAM(s) Oral daily  enoxaparin Injectable 40 milliGRAM(s) SubCutaneous <User Schedule>  levETIRAcetam 500 milliGRAM(s) Oral every 12 hours  multivitamin 1 Tablet(s) Oral daily  pantoprazole    Tablet 40 milliGRAM(s) Oral before breakfast  polyethylene glycol 3350 17 Gram(s) Oral every 12 hours  senna 2 Tablet(s) Oral at bedtime  sodium chloride 0.65% Nasal 1 Spray(s) Both Nostrils three times a day  venlafaxine XR. 150 milliGRAM(s) Oral daily    MEDICATIONS  (PRN):  acetaminophen     Tablet .. 650 milliGRAM(s) Oral every 6 hours PRN Temp greater or equal to 38C (100.4F), Mild Pain (1 - 3)  meclizine 12.5 milliGRAM(s) Oral three times a day PRN Dizziness  melatonin 3 milliGRAM(s) Oral at bedtime PRN Insomnia  ondansetron   Disintegrating Tablet 4 milliGRAM(s) Oral every 6 hours PRN Nausea and/or Vomiting  oxyCODONE    IR 5 milliGRAM(s) Oral every 4 hours PRN Moderate Pain (4 - 6)  oxyCODONE    IR 10 milliGRAM(s) Oral every 4 hours PRN Severe Pain (7 - 10)      10-27    132<L>  |  98  |  22  ----------------------------<  97  4.0   |  28  |  1.14    Ca    8.7      27 Oct 2021 05:12      Sodium, Random Urine (10.25.21 @ 12:31)    Sodium, Random Urine: 43: Test Repeated  Reference Ranges have NOT been established for random urine analytes due  to variability in fluid intake and concentration. mmol/L                          14.1   7.82  )-----------( 252      ( 25 Oct 2021 07:39 )             41.2     1Osmolality, Random Urine (10.25.21 @ 12:31)    Osmolality, Random Urine: 532 mosm/Kg    Uric Acid, Serum (10.26.21 @ 05:30)    Uric Acid, Serum: 3.7 mg/dL      Osmolality, Serum (10.26.21 @ 05:30)    Osmolality, Serum: 267 mosmol/kg

## 2021-10-27 NOTE — PROGRESS NOTE ADULT - ASSESSMENT
65 yo F who was found to have a right frontal traumatic SAH, bifrontal SDH, and left suboccipital skull fracture following a mechanical fall down stairs.     Now admitted to [facility] after for initiation of a multidisciplinary rehab program consisting focused on functional mobility, transfers and ADLs - PT/OT/DVT PPX PAIN MEDS    # Functional and gait instability:  # traumatic SAH/SDH 2/2 mechanical fall  # Suboccipital skull fracture   - C/w PT/OT per primary team  - bowel regimen and pain control per pMR  - keppra sz ppx - touch base w/ Neurosurgery about when this should be weaned off. Outpatient f/u w/ Neurosurgeon Dr. Chappell in 1 week from rehab d/c.     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  -  TSH wnl. uric acid wnl.   - Pending: serum osm, urine lytes.   - agree w/ fluid restriction.   - Consideration for medication side effect (wellbutrin and venlafaxine), though less likely as she has been on these meds chronically.     # vertigo  - seen by Neurology during inpatient stay  - meclizine  - outpatient vestibular rehab.      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   - carefully monitor kidney function    # depression  - seen by psychiatry  - pt on wellbutrin and venlafaxine    dvt ppx: lovenox 67 yo F who was found to have a right frontal traumatic SAH, bifrontal SDH, and left suboccipital skull fracture following a mechanical fall down stairs.     Now admitted to [facility] after for initiation of a multidisciplinary rehab program consisting focused on functional mobility, transfers and ADLs - PT/OT/DVT PPX PAIN MEDS    # Functional and gait instability:  # traumatic SAH/SDH 2/2 mechanical fall  # Suboccipital skull fracture   - C/w PT/OT per primary team  - bowel regimen and pain control per pMR  - keppra sz ppx - touch base w/ Neurosurgery about when this should be weaned off. Outpatient f/u w/ Neurosurgeon Dr. Chappell in 1 week from rehab d/c.     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  -  TSH wnl. uric acid wnl.   - urine studies point towards SIADH  - agree w/ fluid restriction.     # vertigo  - seen by Neurology during inpatient stay  - meclizine  - outpatient vestibular rehab.      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   - carefully monitor kidney function    # depression  - seen by psychiatry  - pt on wellbutrin and venlafaxine    dvt ppx: lovenox

## 2021-10-27 NOTE — PROGRESS NOTE ADULT - ASSESSMENT
66y presented to [facility] 10/17/2021 after mechanical fall, found to have R frontal tSAH, bifrontal sdh, and Lt suboccipital skull fracture. No neurosurgical intervention. Hospital course complicated by vertigo likely BPPV after trauma.      TBI with LOC of unclear duration : B/L frontal SDH/SAH and Calvarial fx  - Continue comprehensive rehab program, PT/OT/SLP 3 hours a day, 5 days a week.  On Keppra for seizure ppx- ? duration , no documented seizures   - Precautions: falls, skull fracture, seizure       #Vertigo  - Likely BPPV S/P trauma  - Continue Meclizine PRN  - Declined Epley maneuver due to back issues per ENT at [facility], - Vestibular therapy- d/w therapy   - F/u neuro and ENT outpatient    Labs with hyponatremia:  Sodium improving slowly  ? SIADH in setting of TBI.  ? dc fluid restriction.     Depression:- Continue Effexor XR 150mg daily and - Continue Wellbutrin  mg daily.      Unilateral kidney- watch creat - stable at this time     Sleep: melatonin prn     GI/Bowel:- Senna QHS, Miralax PRN Daily    /Bladder: toilet schedule prn     DVT ppx:- Lovenox, - SCDs    hospitalist consult noted

## 2021-10-28 ENCOUNTER — TRANSCRIPTION ENCOUNTER (OUTPATIENT)
Age: 66
End: 2021-10-28

## 2021-10-28 LAB
ALBUMIN SERPL ELPH-MCNC: 2.9 G/DL — LOW (ref 3.3–5)
ALP SERPL-CCNC: 83 U/L — SIGNIFICANT CHANGE UP (ref 40–120)
ALT FLD-CCNC: 19 U/L — SIGNIFICANT CHANGE UP (ref 10–45)
ANION GAP SERPL CALC-SCNC: 10 MMOL/L — SIGNIFICANT CHANGE UP (ref 5–17)
AST SERPL-CCNC: 15 U/L — SIGNIFICANT CHANGE UP (ref 10–40)
BASOPHILS # BLD AUTO: 0.07 K/UL — SIGNIFICANT CHANGE UP (ref 0–0.2)
BASOPHILS NFR BLD AUTO: 0.9 % — SIGNIFICANT CHANGE UP (ref 0–2)
BILIRUB SERPL-MCNC: 0.6 MG/DL — SIGNIFICANT CHANGE UP (ref 0.2–1.2)
BUN SERPL-MCNC: 21 MG/DL — SIGNIFICANT CHANGE UP (ref 7–23)
CALCIUM SERPL-MCNC: 8.9 MG/DL — SIGNIFICANT CHANGE UP (ref 8.4–10.5)
CHLORIDE SERPL-SCNC: 98 MMOL/L — SIGNIFICANT CHANGE UP (ref 96–108)
CO2 SERPL-SCNC: 26 MMOL/L — SIGNIFICANT CHANGE UP (ref 22–31)
CREAT SERPL-MCNC: 1.03 MG/DL — SIGNIFICANT CHANGE UP (ref 0.5–1.3)
EOSINOPHIL # BLD AUTO: 0.21 K/UL — SIGNIFICANT CHANGE UP (ref 0–0.5)
EOSINOPHIL NFR BLD AUTO: 2.8 % — SIGNIFICANT CHANGE UP (ref 0–6)
GLUCOSE SERPL-MCNC: 97 MG/DL — SIGNIFICANT CHANGE UP (ref 70–99)
HCT VFR BLD CALC: 36.6 % — SIGNIFICANT CHANGE UP (ref 34.5–45)
HGB BLD-MCNC: 12.3 G/DL — SIGNIFICANT CHANGE UP (ref 11.5–15.5)
IMM GRANULOCYTES NFR BLD AUTO: 0.5 % — SIGNIFICANT CHANGE UP (ref 0–1.5)
LYMPHOCYTES # BLD AUTO: 1.76 K/UL — SIGNIFICANT CHANGE UP (ref 1–3.3)
LYMPHOCYTES # BLD AUTO: 23.1 % — SIGNIFICANT CHANGE UP (ref 13–44)
MCHC RBC-ENTMCNC: 30.9 PG — SIGNIFICANT CHANGE UP (ref 27–34)
MCHC RBC-ENTMCNC: 33.6 GM/DL — SIGNIFICANT CHANGE UP (ref 32–36)
MCV RBC AUTO: 92 FL — SIGNIFICANT CHANGE UP (ref 80–100)
MONOCYTES # BLD AUTO: 0.78 K/UL — SIGNIFICANT CHANGE UP (ref 0–0.9)
MONOCYTES NFR BLD AUTO: 10.2 % — SIGNIFICANT CHANGE UP (ref 2–14)
NEUTROPHILS # BLD AUTO: 4.76 K/UL — SIGNIFICANT CHANGE UP (ref 1.8–7.4)
NEUTROPHILS NFR BLD AUTO: 62.5 % — SIGNIFICANT CHANGE UP (ref 43–77)
NRBC # BLD: 0 /100 WBCS — SIGNIFICANT CHANGE UP (ref 0–0)
PLATELET # BLD AUTO: 233 K/UL — SIGNIFICANT CHANGE UP (ref 150–400)
POTASSIUM SERPL-MCNC: 4 MMOL/L — SIGNIFICANT CHANGE UP (ref 3.5–5.3)
POTASSIUM SERPL-SCNC: 4 MMOL/L — SIGNIFICANT CHANGE UP (ref 3.5–5.3)
PROT SERPL-MCNC: 6.5 G/DL — SIGNIFICANT CHANGE UP (ref 6–8.3)
RBC # BLD: 3.98 M/UL — SIGNIFICANT CHANGE UP (ref 3.8–5.2)
RBC # FLD: 13.2 % — SIGNIFICANT CHANGE UP (ref 10.3–14.5)
SODIUM SERPL-SCNC: 134 MMOL/L — LOW (ref 135–145)
WBC # BLD: 7.62 K/UL — SIGNIFICANT CHANGE UP (ref 3.8–10.5)
WBC # FLD AUTO: 7.62 K/UL — SIGNIFICANT CHANGE UP (ref 3.8–10.5)

## 2021-10-28 PROCEDURE — 99232 SBSQ HOSP IP/OBS MODERATE 35: CPT

## 2021-10-28 RX ORDER — ACETAMINOPHEN 500 MG
2 TABLET ORAL
Qty: 0 | Refills: 0 | DISCHARGE
Start: 2021-10-28

## 2021-10-28 RX ORDER — POLYETHYLENE GLYCOL 3350 17 G/17G
17 POWDER, FOR SOLUTION ORAL
Qty: 0 | Refills: 0 | DISCHARGE
Start: 2021-10-28

## 2021-10-28 RX ORDER — VENLAFAXINE HCL 75 MG
1 CAPSULE, EXT RELEASE 24 HR ORAL
Qty: 0 | Refills: 0 | DISCHARGE
Start: 2021-10-28

## 2021-10-28 RX ORDER — BUPROPION HYDROCHLORIDE 150 MG/1
1 TABLET, EXTENDED RELEASE ORAL
Qty: 0 | Refills: 0 | DISCHARGE
Start: 2021-10-28

## 2021-10-28 RX ORDER — LEVETIRACETAM 250 MG/1
250 TABLET, FILM COATED ORAL
Refills: 0 | Status: DISCONTINUED | OUTPATIENT
Start: 2021-10-28 | End: 2021-10-30

## 2021-10-28 RX ADMIN — Medication 1 TABLET(S): at 11:28

## 2021-10-28 RX ADMIN — BUPROPION HYDROCHLORIDE 150 MILLIGRAM(S): 150 TABLET, EXTENDED RELEASE ORAL at 11:28

## 2021-10-28 RX ADMIN — Medication 5 MILLIGRAM(S): at 19:49

## 2021-10-28 RX ADMIN — LEVETIRACETAM 250 MILLIGRAM(S): 250 TABLET, FILM COATED ORAL at 22:13

## 2021-10-28 RX ADMIN — LEVETIRACETAM 500 MILLIGRAM(S): 250 TABLET, FILM COATED ORAL at 06:17

## 2021-10-28 RX ADMIN — Medication 1 SPRAY(S): at 06:17

## 2021-10-28 RX ADMIN — POLYETHYLENE GLYCOL 3350 17 GRAM(S): 17 POWDER, FOR SOLUTION ORAL at 19:49

## 2021-10-28 RX ADMIN — Medication 5 MILLIGRAM(S): at 06:17

## 2021-10-28 RX ADMIN — Medication 1 SPRAY(S): at 15:00

## 2021-10-28 RX ADMIN — POLYETHYLENE GLYCOL 3350 17 GRAM(S): 17 POWDER, FOR SOLUTION ORAL at 06:17

## 2021-10-28 RX ADMIN — ENOXAPARIN SODIUM 40 MILLIGRAM(S): 100 INJECTION SUBCUTANEOUS at 19:49

## 2021-10-28 RX ADMIN — Medication 1 SPRAY(S): at 22:13

## 2021-10-28 RX ADMIN — Medication 150 MILLIGRAM(S): at 11:28

## 2021-10-28 RX ADMIN — Medication 650 MILLIGRAM(S): at 11:29

## 2021-10-28 RX ADMIN — Medication 650 MILLIGRAM(S): at 12:15

## 2021-10-28 RX ADMIN — PANTOPRAZOLE SODIUM 40 MILLIGRAM(S): 20 TABLET, DELAYED RELEASE ORAL at 06:17

## 2021-10-28 NOTE — DISCHARGE NOTE PROVIDER - HOSPITAL COURSE
A 66 year old female presented with headache and nausea s/p mechanical fall down stairs. Patient took last ASA 3 days ago. ARU upon admission was 632. Patient was admitted to neurosurgery on 10/17/2021 for R frontal tSAH, bifrontal sdh, and Lt suboccipital skull fracture. 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 Serial CT scans of head were performed during this admission with the last stable one on 10/20. Hospitalist consult was appreciated. Patient was advised to have RASHEEDA stocking and hydration for orthostatic hypotension. Psychiatry consult was appreciated and adjusted her anti-depressant giving her dizziness/vertigo after fall. Neurology and ENT consults were also appreciated for vertigo. Patient was started on meclizine as needed for dizziness and recommended with vestibular rehab.   Patient was medically stable for discharge to  for AC 10/23/21.     Patient participated in daily therapies and made good functional gains.  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 BPPV , diagnosed R sided BPPV and treated w/ canal repositioning therapy.         Patient tolerated course of inpatient PT/OT/SLP rehab with significant functional improvements.       Patient seen and examined on day of discharge.  Medications, medication side effects, and discharge instructions were reviewed with the patient, who expressed understanding of all information.  Patient was medically and functionally optimized and cleared for discharge. A 66 year old female presented with headache and nausea s/p mechanical fall down stairs. Patient took last ASA 3 days ago. ARU upon admission was 632. Patient was admitted to neurosurgery on 10/17/2021 for R frontal tSAH, bifrontal sdh, and Lt suboccipital skull fracture. 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 Serial CT scans of head were performed during this admission with the last stable one on 10/20. Hospitalist consult was appreciated. Patient was advised to have RASHEEDA stocking and hydration for orthostatic hypotension. Psychiatry consult was appreciated and adjusted her anti-depressant giving her dizziness/vertigo after fall. Neurology and ENT consults were also appreciated for vertigo. Patient was started on meclizine as needed for dizziness and recommended with vestibular rehab.   Patient was medically stable for discharge to  for AC 10/23/21.     Patient participated in daily therapies and made good functional gains.  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 BPPV , diagnosed R sided BPPV and treated w/ canal repositioning therapy with improvement in vertigo and subsequently able to make functional gains in therapy.      Patient tolerated course of inpatient PT/OT/SLP rehab with significant functional improvements.       Patient seen and examined on day of discharge.  Medications, medication side effects, and discharge instructions were reviewed with the patient, who expressed understanding of all information.  Patient was medically and functionally optimized and cleared for discharge.

## 2021-10-28 NOTE — DISCHARGE NOTE PROVIDER - NSDCCPCAREPLAN_GEN_ALL_CORE_FT
PRINCIPAL DISCHARGE DIAGNOSIS  Diagnosis: Traumatic brain injury  Assessment and Plan of Treatment: You had injury to the brain in multiple areas due to a fall. For this, you were monitored in the prior hospital and stabilized with medications adjusted (including psychiatric medication). You need to follow-up with neurosurgeon on discharge from rehab.      SECONDARY DISCHARGE DIAGNOSES  Diagnosis: BPPV (benign paroxysmal positional vertigo), right  Assessment and Plan of Treatment: You were diagnosed with vertigo on the right side. You were treated w/ vestibular therapy here and were given medication to take as needed (meclizine) at prior hospital as well. Your symptoms improved but have not completely gone away. For this, you must continue vestibular therapy (locations were provided) and follow-up with ENT specialist.    Diagnosis: Hyponatremia  Assessment and Plan of Treatment: You had low sodium levels likely due to syndrome of inappropriate antidiuretic hormone secretion, which can be a reaction to trauma in the brain. This should improve over time. Make appointment to follow-up with your primary care doctor to monitor your sodium levels.

## 2021-10-28 NOTE — DISCHARGE NOTE PROVIDER - NSDCACTIVITY_GEN_ALL_CORE
Do not drive or operate machinery Do not drive or operate machinery/Showering allowed/Do not make important decisions/Stairs allowed/Walking - Indoors allowed/Walking - Outdoors allowed

## 2021-10-28 NOTE — DISCHARGE NOTE PROVIDER - PROVIDER TOKENS
PROVIDER:[TOKEN:[[id number]],FOLLOWUP:[1 week]],PROVIDER:[TOKEN:[[id number]],FOLLOWUP:[1 month]],PROVIDER:[TOKEN:[[id number]],FOLLOWUP:[1 month]],PROVIDER:[TOKEN:[[id number]],FOLLOWUP:[1 month]]

## 2021-10-28 NOTE — DISCHARGE NOTE PROVIDER - NSDCCAREPROVSEEN_GEN_ALL_CORE_FT
Consent: Written consent obtained, risks reviewed including but not limited to crusting, scabbing, blistering, scarring, darker or lighter pigmentary change, incidental hair removal, bruising, and/or incomplete removal. Rehab - Ileana Nguyen

## 2021-10-28 NOTE — DISCHARGE NOTE PROVIDER - NSDCMRMEDTOKEN_GEN_ALL_CORE_FT
acetaminophen 325 mg oral tablet: 2 tab(s) orally every 6 hours, As needed, Temp greater or equal to 38C (100.4F), Mild Pain (1 - 3)  Effexor XR 75 mg oral capsule, extended release: 2 cap(s) orally once a day  levETIRAcetam 500 mg oral tablet: 1 tab(s) orally every 12 hours  meclizine 12.5 mg oral tablet: 1 tab(s) orally 3 times a day, As needed, Dizziness  Wellbutrin  mg/12 hours oral tablet, extended release: 2 tab(s) orally once a day   acetaminophen 325 mg oral tablet: 2 tab(s) orally every 6 hours, As needed, Temp greater or equal to 38C (100.4F), Mild Pain (1 - 3)  buPROPion 150 mg/24 hours (XL) oral tablet, extended release: 1 tab(s) orally once a day  Diagnosis: TBI due to fall, Right BPPV: PTand OT   2-3 times/week for 4 weeks  Evaluate and treat  Balance, GCE, ROM, Active, AAROM,   ADls, neuromuscular reeducation, gait    Precautions: fall  Multiple Vitamins oral tablet: 1 tab(s) orally once a day  polyethylene glycol 3350 oral powder for reconstitution: 17 gram(s) orally every 12 hours  venlafaxine 150 mg oral capsule, extended release: 1 cap(s) orally once a day

## 2021-10-28 NOTE — DISCHARGE NOTE PROVIDER - CARE PROVIDER_API CALL
Ulisses Chappell (MD)  Neurosurgery  [address], [address]  [place] [address]  Phone: [phone]  Fax: [phone]  Follow Up Time: 1 week    Martinez Lewis (DO)  Neurology  [address], [address]  [place] [address]  Phone: [phone]  Fax: [phone]  Follow Up Time: 1 month    Pepe Hernandez)  Otolaryngology  [address], [address]  [place] [address]  Phone: [phone]  Fax: [phone]  Follow Up Time: 1 month    Ileana Nguyen)  Brain Injury Medicine; PhysicalRehab Medicine  [address] [place] [address]  Phone: [phone]  Fax: [phone]  Follow Up Time: 1 month

## 2021-10-28 NOTE — DISCHARGE NOTE PROVIDER - NSDCFUADDINST_GEN_ALL_CORE_FT
Make appointments with your neurologist, neurosurgeon, primary care doctor, ENT specialist and rehab doctor for follow-up. Also, locations to continue vestibular therapy for vertigo were provided - please make appointment to continue this treatment.  Return to the ED if you have worsening symptoms such as slurred speech, weakness of the extremities, chest pain, difficulty breathing.  Make appointments with your neurologist, neurosurgeon, primary care doctor, ENT specialist and rehab doctor for follow-up. Also, locations to continue vestibular therapy for vertigo were provided - please make appointment to continue this treatment.  Return to the ED if you have worsening symptoms such as slurred speech, weakness of the extremities, chest pain, difficulty breathing.     Continue the exercises as recommended by PT and vestibular therapy. Wear support stockings when out of bed. Follow up with physicians as recommended

## 2021-10-28 NOTE — PROGRESS NOTE ADULT - SUBJECTIVE AND OBJECTIVE BOX
Patient is a 66y old  Female who presents with a chief complaint of SAH, ADH, and suboccipital skull fracture after mechanical fall (24 Oct 2021 12:52)  HPI:  A 66 year old female presented with headache and nausea s/p mechanical fall down stairs. Patient took last ASA 3 days ago. ARU upon admission was 632. Patient was admitted to neurosurgery on 10/17/2021 for R frontal tSAH, bifrontal sdh, and Lt suboccipital skull fracture. 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 Serial CT scans of head were performed during this admission with the last stable one on 10/20. Hospitalist consult was appreciated. Patient was advised to have RASHEEDA stocking and hydration for orthostatic hypotension. Psychiatry consult was appreciated and adjusted her anti-depressant giving her dizziness/vertigo after fall. Neurology and ENT consults were also appreciated for vertigo. Patient was started on meclizine as needed for dizziness and recommended with vestibular rehab.   Patient was medically stable for discharge to  for AC 10/23/21. (23 Oct 2021 15:00)      TODAY'S SUBJECTIVE & REVIEW OF SYMPTOMS  Patient seen in PT gym  Reports poor sleep due to room mate issues.   Has buttock pain at rest and ambulation.  Seen by Vestibular PT and diagnosed with right BPPV.      Denies CP/palpitations/abdominal pain or nausea  + vertigo- but improved  + BM     Vital Signs Last 24 Hrs  T(C): 36.8 (28 Oct 2021 08:26), Max: 36.8 (28 Oct 2021 08:26)  T(F): 98.2 (28 Oct 2021 08:26), Max: 98.2 (28 Oct 2021 08:26)  HR: 74 (28 Oct 2021 08:26) (66 - 74)  BP: 105/69 (28 Oct 2021 08:26) (105/69 - 123/83)  BP(mean): --  RR: 16 (28 Oct 2021 08:26) (15 - 16)  SpO2: 98% (28 Oct 2021 08:26) (98% - 98%)      Constitutional - NAD, Comfortable  Chest - breathing comfortably   Cardiovascular -   Abdomen - BS+, Soft, NTND  Extremities - No C/C/E, No calf tenderness , Ecchymosis - right calf, left side neck,  Neurologic Exam -stable, no focal motor weakness                  Psychiatric - Mood stable, Affect WNL    Function:  CG/CS with transfers/  Min assist without device     MEDICATIONS  (STANDING):  bisacodyl 5 milliGRAM(s) Oral every 12 hours  buPROPion XL (24-Hour) . 150 milliGRAM(s) Oral daily  enoxaparin Injectable 40 milliGRAM(s) SubCutaneous <User Schedule>  levETIRAcetam 500 milliGRAM(s) Oral every 12 hours  multivitamin 1 Tablet(s) Oral daily  pantoprazole    Tablet 40 milliGRAM(s) Oral before breakfast  polyethylene glycol 3350 17 Gram(s) Oral every 12 hours  senna 2 Tablet(s) Oral at bedtime  sodium chloride 0.65% Nasal 1 Spray(s) Both Nostrils three times a day  venlafaxine XR. 150 milliGRAM(s) Oral daily    MEDICATIONS  (PRN):  acetaminophen     Tablet .. 650 milliGRAM(s) Oral every 6 hours PRN Temp greater or equal to 38C (100.4F), Mild Pain (1 - 3)  meclizine 12.5 milliGRAM(s) Oral three times a day PRN Dizziness  melatonin 3 milliGRAM(s) Oral at bedtime PRN Insomnia  ondansetron   Disintegrating Tablet 4 milliGRAM(s) Oral every 6 hours PRN Nausea and/or Vomiting  oxyCODONE    IR 5 milliGRAM(s) Oral every 4 hours PRN Moderate Pain (4 - 6)  oxyCODONE    IR 10 milliGRAM(s) Oral every 4 hours PRN Severe Pain (7 - 10)                            12.3   7.62  )-----------( 233      ( 28 Oct 2021 06:22 )             36.6       10-28    134<L>  |  98  |  21  ----------------------------<  97  4.0   |  26  |  1.03    Ca    8.9      28 Oct 2021 06:22    TPro  6.5  /  Alb  2.9<L>  /  TBili  0.6  /  DBili  x   /  AST  15  /  ALT  19  /  AlkPhos  83  10-28        Sodium, Random Urine (10.25.21 @ 12:31)    Sodium, Random Urine: 43: Test Repeated  Reference Ranges have NOT been established for random urine analytes due  to variability in fluid intake and concentration. mmol/L         1Osmolality, Random Urine (10.25.21 @ 12:31)    Osmolality, Random Urine: 532 mosm/Kg    Uric Acid, Serum (10.26.21 @ 05:30)    Uric Acid, Serum: 3.7 mg/dL      Osmolality, Serum (10.26.21 @ 05:30)    Osmolality, Serum: 267 mosmol/kg

## 2021-10-28 NOTE — PROGRESS NOTE ADULT - ASSESSMENT
66y presented to [facility] 10/17/2021 after mechanical fall, found to have R frontal tSAH, bifrontal sdh, and Lt suboccipital skull fracture. No neurosurgical intervention. Hospital course complicated by vertigo likely BPPV after trauma.      TBI with LOC of unclear duration : B/L frontal SDH/SAH and Calvarial fx  - Continue comprehensive rehab program, PT/OT/SLP 3 hours a day, 5 days a week.  On Keppra for seizure ppx- ? duration , no documented seizures   - Precautions: falls, skull fracture, seizure     #Vertigo  - Right BPPV- treated by PT - to re-evaluate tomorrow  - Continue Meclizine PRN  - Declined Epley maneuver due to back issues per ENT at [facility], - Vestibular therapy- d/w therapy   - F/u neuro and ENT outpatient    Labs with hyponatremia:  Sodium improving slowly  ? SIADH in setting of TBI.  off fluid restriction. . Recheck in a few days     Depression:- Continue Effexor XR 150mg daily and - Continue Wellbutrin  mg daily.      Unilateral kidney- watch creat - stable at this time     Sleep: melatonin prn     GI/Bowel:- Senna QHS, Miralax PRN Daily    /Bladder: toilet schedule prn     DVT ppx:- Lovenox, - SCDs    hospitalist consult noted     Disp: team conference today

## 2021-10-29 ENCOUNTER — TRANSCRIPTION ENCOUNTER (OUTPATIENT)
Age: 66
End: 2021-10-29

## 2021-10-29 PROCEDURE — 99232 SBSQ HOSP IP/OBS MODERATE 35: CPT

## 2021-10-29 PROCEDURE — 96116 NUBHVL XM PHYS/QHP 1ST HR: CPT

## 2021-10-29 RX ADMIN — Medication 1 TABLET(S): at 12:47

## 2021-10-29 RX ADMIN — LEVETIRACETAM 250 MILLIGRAM(S): 250 TABLET, FILM COATED ORAL at 17:50

## 2021-10-29 RX ADMIN — Medication 5 MILLIGRAM(S): at 17:49

## 2021-10-29 RX ADMIN — POLYETHYLENE GLYCOL 3350 17 GRAM(S): 17 POWDER, FOR SOLUTION ORAL at 17:49

## 2021-10-29 RX ADMIN — LEVETIRACETAM 250 MILLIGRAM(S): 250 TABLET, FILM COATED ORAL at 05:43

## 2021-10-29 RX ADMIN — PANTOPRAZOLE SODIUM 40 MILLIGRAM(S): 20 TABLET, DELAYED RELEASE ORAL at 05:42

## 2021-10-29 RX ADMIN — Medication 1 SPRAY(S): at 22:07

## 2021-10-29 RX ADMIN — POLYETHYLENE GLYCOL 3350 17 GRAM(S): 17 POWDER, FOR SOLUTION ORAL at 05:42

## 2021-10-29 RX ADMIN — Medication 150 MILLIGRAM(S): at 12:47

## 2021-10-29 RX ADMIN — Medication 5 MILLIGRAM(S): at 05:42

## 2021-10-29 RX ADMIN — BUPROPION HYDROCHLORIDE 150 MILLIGRAM(S): 150 TABLET, EXTENDED RELEASE ORAL at 12:47

## 2021-10-29 RX ADMIN — Medication 1 SPRAY(S): at 14:59

## 2021-10-29 RX ADMIN — ENOXAPARIN SODIUM 40 MILLIGRAM(S): 100 INJECTION SUBCUTANEOUS at 17:48

## 2021-10-29 RX ADMIN — Medication 1 SPRAY(S): at 05:43

## 2021-10-29 NOTE — PROGRESS NOTE ADULT - SUBJECTIVE AND OBJECTIVE BOX
Patient is a 66y old  Female who presents with a chief complaint of SAH, ADH, and suboccipital skull fracture after mechanical fall (29 Oct 2021 12:43)      24 HOUR EVENTS:  No overnight events reported.     SUBJECTIVE:  Patient seen and examined at bedside.   no acute complaints - looking forward to going home today.     ALLERGIES:  No Known Allergies    MEDICATIONS  (STANDING):  bisacodyl 5 milliGRAM(s) Oral every 12 hours  buPROPion XL (24-Hour) . 150 milliGRAM(s) Oral daily  enoxaparin Injectable 40 milliGRAM(s) SubCutaneous <User Schedule>  levETIRAcetam 250 milliGRAM(s) Oral two times a day  multivitamin 1 Tablet(s) Oral daily  pantoprazole    Tablet 40 milliGRAM(s) Oral before breakfast  polyethylene glycol 3350 17 Gram(s) Oral every 12 hours  senna 2 Tablet(s) Oral at bedtime  sodium chloride 0.65% Nasal 1 Spray(s) Both Nostrils three times a day  venlafaxine XR. 150 milliGRAM(s) Oral daily    MEDICATIONS  (PRN):  acetaminophen     Tablet .. 650 milliGRAM(s) Oral every 6 hours PRN Temp greater or equal to 38C (100.4F), Mild Pain (1 - 3)  melatonin 3 milliGRAM(s) Oral at bedtime PRN Insomnia    Vital Signs Last 24 Hrs  T(F): 98.4 (29 Oct 2021 07:30), Max: 98.4 (29 Oct 2021 07:30)  HR: 70 (29 Oct 2021 07:30) (70 - 78)  BP: 126/78 (29 Oct 2021 07:30) (100/67 - 126/78)  RR: 16 (29 Oct 2021 07:30) (15 - 16)  SpO2: 99% (29 Oct 2021 07:30) (99% - 100%)  I&O's Summary    PHYSICAL EXAM:  General: NAD, A/O x 3  ENT: Moist mucous membranes, no thrush  Neck: Supple, No JVD  Lungs: Clear to auscultation bilaterally, good air entry, non-labored breathing  Cardio: RRR, S1/S2, No murmur  Abdomen: Soft, Nontender, Nondistended; Bowel sounds present  Extremities: No calf tenderness, No pitting edema    LABS:                        12.3   7.62  )-----------( 233      ( 28 Oct 2021 06:22 )             36.6     10-28    134  |  98  |  21  ----------------------------<  97  4.0   |  26  |  1.03    Ca    8.9      28 Oct 2021 06:22    TPro  6.5  /  Alb  2.9  /  TBili  0.6  /  DBili  x   /  AST  15  /  ALT  19  /  AlkPhos  83  10-28        eGFR if Non African American: 56 mL/min/1.73M2 (10-28-21 @ 06:22)  eGFR if African American: 65 mL/min/1.73M2 (10-28-21 @ 06:22)                                    COVID-19 PCR: NotDetec (10-23-21 @ 19:49)  COVID-19 PCR: NotDetec (10-21-21 @ 06:51)  COVID-19 PCR: NotDetec (10-19-21 @ 06:28)  COVID-19 PCR: NotDetec (10-17-21 @ 18:25)    RADIOLOGY & ADDITIONAL TESTS:    Care Discussed with Consultants/Other Providers:

## 2021-10-29 NOTE — CONSULT NOTE ADULT - ASSESSMENT
Assessment: Pt presents with relatively intact cognitive functioning. Mild difficulties were noted in higher level cognitive functions including initiation and problem solving. Her affect is constricted but response to humor, and she reports a few adjustment symptoms in response to her current medical condition. She has hx of depression which appears to be relatively well controlled with medication. FIM scores: Social Interaction 6; Problem Solving 6; Memory 7.  Plan: As Pt is being discharged tomorrow, she will benefit from continuation of antidepressant medication and individual counseling/psychotherapy. Discussed the need to consider adding a wellness component into her mental health care, and two potential programs were discussed as worth exploring involving yoga, meditation and other wellness elements. Adhere to outpatient rehab treatments recommended. Continue with regular healthcare with her PCP.      Time spent with Pt, 50 minutes.  
B/L frontal SDH/SAH and Calvarial fx sec to fall  PT/OT per rehab  Keppra for sz ppx    Vertigo  Meclizine prn    Dep  Effexor/Wellbutrin(pt's wellbutrin was apparently doubled up days prior to fall and wellbutrin was dec'd at [facility])    Orthostatic  Pt was apparently orthostatic at [facility]  compression stockings/abd binder  Check Orthostatic daily for now    Hyponatremia/Elev Cr  BMP in AM    DVT ppx  Lovenox

## 2021-10-29 NOTE — PROGRESS NOTE ADULT - SUBJECTIVE AND OBJECTIVE BOX
Patient is a 66y old  Female who presents with a chief complaint of SAH, ADH, and suboccipital skull fracture after mechanical fall (24 Oct 2021 12:52)  HPI:  A 66 year old female presented with headache and nausea s/p mechanical fall down stairs. Patient took last ASA 3 days ago. ARU upon admission was 632. Patient was admitted to neurosurgery on 10/17/2021 for R frontal tSAH, bifrontal sdh, and Lt suboccipital skull fracture. 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 Serial CT scans of head were performed during this admission with the last stable one on 10/20. Hospitalist consult was appreciated. Patient was advised to have RASHEEDA stocking and hydration for orthostatic hypotension. Psychiatry consult was appreciated and adjusted her anti-depressant giving her dizziness/vertigo after fall. Neurology and ENT consults were also appreciated for vertigo. Patient was started on meclizine as needed for dizziness and recommended with vestibular rehab.   Patient was medically stable for discharge to  for AC 10/23/21.      TODAY'S SUBJECTIVE & REVIEW OF SYMPTOMS  Patient seen at bedside  Feels well, slept well  Continues to have muscular buttock pain - not affecting gait-. Therapist working on hamstring tightness.   Patient states that her vertigo is 80% better.   Keppra taper    Denies CP/palpitations/abdominal pain or nausea  + vertigo- but improved  + BM     Vital Signs Last 24 Hrs  T(C): 36.9 (29 Oct 2021 07:30), Max: 36.9 (29 Oct 2021 07:30)  T(F): 98.4 (29 Oct 2021 07:30), Max: 98.4 (29 Oct 2021 07:30)  HR: 70 (29 Oct 2021 07:30) (70 - 78)  BP: 126/78 (29 Oct 2021 07:30) (100/67 - 126/78)  BP(mean): --  RR: 16 (29 Oct 2021 07:30) (15 - 16)  SpO2: 99% (29 Oct 2021 07:30) (99% - 100%)    Constitutional - NAD, Comfortable  Chest - breathing comfortably   Cardiovascular - S1S2  Abdomen - BS+, Soft, NTND  Extremities - No C/C/E, No calf tenderness , Ecchymosis - right calf, left side neck,- resolving  Neurologic Exam -stable, no focal motor weakness                  Psychiatric - Mood stable, Affect WNL    Function:  Supervision with rollator                             12.3   7.62  )-----------( 233      ( 28 Oct 2021 06:22 )             36.6       10-28    134<L>  |  98  |  21  ----------------------------<  97  4.0   |  26  |  1.03    Ca    8.9      28 Oct 2021 06:22    TPro  6.5  /  Alb  2.9<L>  /  TBili  0.6  /  DBili  x   /  AST  15  /  ALT  19  /  AlkPhos  83  10-28        MEDICATIONS  (STANDING):  bisacodyl 5 milliGRAM(s) Oral every 12 hours  buPROPion XL (24-Hour) . 150 milliGRAM(s) Oral daily  enoxaparin Injectable 40 milliGRAM(s) SubCutaneous <User Schedule>  levETIRAcetam 250 milliGRAM(s) Oral two times a day  multivitamin 1 Tablet(s) Oral daily  pantoprazole    Tablet 40 milliGRAM(s) Oral before breakfast  polyethylene glycol 3350 17 Gram(s) Oral every 12 hours  senna 2 Tablet(s) Oral at bedtime  sodium chloride 0.65% Nasal 1 Spray(s) Both Nostrils three times a day  venlafaxine XR. 150 milliGRAM(s) Oral daily    MEDICATIONS  (PRN):  acetaminophen     Tablet .. 650 milliGRAM(s) Oral every 6 hours PRN Temp greater or equal to 38C (100.4F), Mild Pain (1 - 3)  melatonin 3 milliGRAM(s) Oral at bedtime PRN Insomnia

## 2021-10-29 NOTE — PROGRESS NOTE ADULT - ASSESSMENT
66y presented to [facility] 10/17/2021 after mechanical fall, found to have R frontal tSAH, bifrontal sdh, and Lt suboccipital skull fracture. No neurosurgical intervention. Hospital course complicated by vertigo likely BPPV after trauma.      TBI with LOC of unclear duration : B/L frontal SDH/SAH and Calvarial fx  - Continue comprehensive rehab program, PT/OT/SLP 3 hours a day, 5 days a week. Patient dc from Psychiatric taper : prior neuro notes - duration only 1 week  - Precautions: falls, skull fracture,     #Vertigo  - Right BPPV- treated by PT - to re-evaluate today  - F/u neuro and ENT outpatient    Labs with hyponatremia:  Sodium improving slowly  ? SIADH in setting of TBI.  off fluid restriction.     Depression:- Continue Effexor XR 150mg daily and Wellbutrin  mg daily.      Unilateral kidney- stable at this time     Sleep: melatonin prn     GI/Bowel:- Senna QHS, Miralax bid Daily    /Bladder: toilet schedule prn     DVT ppx:- Lovenox,    hospitalist consult noted     Disp: team conference 10/28:  Progressing well in therapy and meeting goals  dc in am with outpatient therapy -   Medications reviewed with patient

## 2021-10-29 NOTE — DISCHARGE NOTE NURSING/CASE MANAGEMENT/SOCIAL WORK - NSDCDMETYPESERV_GEN_ALL_CORE_FT
Your rollator will be delivered to your bedside on Saturday. Your commode was delivered on Friday to your bedside

## 2021-10-29 NOTE — CONSULT NOTE ADULT - SUBJECTIVE AND OBJECTIVE BOX
HPI:  A 66 year old female presented with headache and nausea s/p mechanical fall down stairs. Patient took last ASA 3 days ago. ARU upon admission was 632. Patient was admitted to neurosurgery on 10/17/2021 for R frontal tSAH, bifrontal sdh, and Lt suboccipital skull fracture. 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 Serial CT scans of head were performed during this admission with the last stable one on 10/20. Hospitalist consult was appreciated. Patient was advised to have RASHEEDA stocking and hydration for orthostatic hypotension. Psychiatry consult was appreciated and adjusted her anti-depressant giving her dizziness/vertigo after fall. Neurology and ENT consults were also appreciated for vertigo. Patient was started on meclizine as needed for dizziness and recommended with vestibular rehab.     Patient was medically stable for discharge to  for AC 10/23/21. (23 Oct 2021 15:00)    CC:  Pos HA, dizzy when getting up.   Tired and sleepy.     PAST MEDICAL & SURGICAL HISTORY:  HLD (hyperlipidemia)    H/O ureterostomy    S/p nephrectomy        FAMILY HISTORY:  non pertinent    SOCIAL HISTORY  Smoking - quit in 1992  EtOH - Denied   Drugs - Denied    FUNCTIONAL HISTORY  Pt reports she lives in a private house with 3 steps to enter & first floor set up. Pt lives with her daughter who works from home. Pt reports she was independent with all mobility & ADL's prior to admit. + shower stall. +seat and gb, No other ae. Brother also offered his home to patient.     MEDICATIONS  (STANDING):  levETIRAcetam 500 milliGRAM(s) Oral every 12 hours  multivitamin 1 Tablet(s) Oral daily  pantoprazole    Tablet 40 milliGRAM(s) Oral before breakfast  polyethylene glycol 3350 17 Gram(s) Oral every 12 hours  senna 2 Tablet(s) Oral at bedtime  venlafaxine XR. 150 milliGRAM(s) Oral daily    MEDICATIONS  (PRN):  acetaminophen     Tablet .. 650 milliGRAM(s) Oral every 6 hours PRN Temp greater or equal to 38C (100.4F), Mild Pain (1 - 3)  ondansetron   Disintegrating Tablet 4 milliGRAM(s) Oral every 6 hours PRN Nausea and/or Vomiting  oxyCODONE    IR 5 milliGRAM(s) Oral every 4 hours PRN Moderate Pain (4 - 6)  oxyCODONE    IR 10 milliGRAM(s) Oral every 4 hours PRN Severe Pain (7 - 10)    ROS:  Constitutional: No fever, No Chills, No fatigue  HEENT: No eye pain, No visual disturbances, No difficulty hearing, No Dysphagia, +vertigo, +headache  Pulm: No cough,  No shortness of breath  Cardio: No chest pain, No palpitations  GI:  No abdominal pain, No nausea, No vomiting, No diarrhea, No constipation, No incontinence   : No dysuria, No frequency, No hematuria, No incontinence  Neuro: No headaches, No memory loss, +loss of strength, No numbness, No tremors  Skin: No itching, No rashes, No lesions   Endo: No temperature intolerance  MSK: No joint pain, No joint swelling, No muscle pain, No Neck or back pain  Psych:  No depression, No anxiety    Vital Signs Last 24 Hrs  T(C): 36.8 (24 Oct 2021 08:14), Max: 36.8 (24 Oct 2021 08:14)  T(F): 98.2 (24 Oct 2021 08:14), Max: 98.2 (24 Oct 2021 08:14)  HR: 87 (24 Oct 2021 08:14) (65 - 87)  BP: 111/76 (24 Oct 2021 08:14) (111/76 - 119/65)  BP(mean): --  RR: 16 (24 Oct 2021 08:14) (15 - 16)  SpO2: 95% (24 Oct 2021 08:14) (95% - 98%)    PHYSICAL EXAM:  GENERAL: NAD  NECK: Supple  NERVOUS SYSTEM:  awake and alert  HEART: S1s2 NL , RRR  CHEST/LUNG: Clear to percussion bilaterally  ABDOMEN: Soft, Nontender, Nondistended; Bowel sounds present  EXTREMITIES:  No edema    CBC Full  -  ( 24 Oct 2021 05:11 )  WBC Count : 8.16 K/uL  RBC Count : 4.50 M/uL  Hemoglobin : 14.0 g/dL  Hematocrit : 41.6 %  Platelet Count - Automated : 216 K/uL  Mean Cell Volume : 92.4 fl  Mean Cell Hemoglobin : 31.1 pg  Mean Cell Hemoglobin Concentration : 33.7 gm/dL  Auto Neutrophil # : 5.36 K/uL  Auto Lymphocyte # : 1.80 K/uL  Auto Monocyte # : 0.74 K/uL  Auto Eosinophil # : 0.18 K/uL  Auto Basophil # : 0.05 K/uL  Auto Neutrophil % : 65.6 %  Auto Lymphocyte % : 22.1 %  Auto Monocyte % : 9.1 %  Auto Eosinophil % : 2.2 %  Auto Basophil % : 0.6 %    10-24    130<L>  |  95<L>  |  19  ----------------------------<  90  3.7   |  28  |  1.31<H>    Ca    8.7      24 Oct 2021 05:11    TPro  6.9  /  Alb  3.1<L>  /  TBili  0.7  /  DBili  x   /  AST  16  /  ALT  21  /  AlkPhos  95  10-24    CAPILLARY BLOOD GLUCOSE        LIVER FUNCTIONS - ( 24 Oct 2021 05:11 )  Alb: 3.1 g/dL / Pro: 6.9 g/dL / ALK PHOS: 95 U/L / ALT: 21 U/L / AST: 16 U/L / GGT: x                 HEALTH ISSUES - PROBLEM Dx:      
Pt is a 66 year-old female, right-handed female who presented with headache and nausea s/p mechanical fall down stairs. Pt took last ASA 3 days ago. ARU upon admission was 632. Pt was admitted to neurosurgery on 10/17/2021 for R frontal tSAH, bifrontal sdh, and Lt suboccipital skull fracture. 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 Serial CT scans of head were performed during this admission with the last stable one on 10/20. Hospitalist consult was appreciated. Pt was advised to have RASHEEDA stocking and hydration for orthostatic hypotension. Psychiatry consult was appreciated and adjusted her anti-depressant giving her dizziness/vertigo after fall. Neurology and ENT consults were also appreciated for vertigo. Pt was started on meclizine as needed for dizziness and recommended with vestibular rehab. Pt was medically stable for discharge to  for AC 10/23/21. PMHx: As noted above. Current meds: Effexor  mg QD, Wellbutrin  mg QD. Please see list in Pt’s chart. Social Hx: Pt is a  and has three adult children. She graduated from college and was just a few credits from completing a master's degree in reading. 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 She has hx of depression and has been taking Effexor over the years and having individual counseling. She quit smoking about 30 years ago, and drinks wine with dinner. She is Ep[employer]opalian but considers herself spiritual. Pt enjoys playing cards and travelling.   Findings: Pt was seen for an initial assessment of her cognitive and emotional functioning. The Modified MMSE (3MS) was administered; Pt’s results (98/100) were in the Normal range. Her scores in geriatric mood measures suggested minimal to mild levels of anxiety and depression (GAS = 2/30; GDS = 3/15). Pt was alert, fully Ox3, and cooperative. Attn/Conc: Simple auditory attention - intact.  Concentration/Working memory for reversed counting and spelling – intact (7/7). Language: Pt’s speech was of normal volume, pitch and pace. Naming - intact. Sentence repetition - intact. Auditory Comprehension - intact. Reading - intact. Writing - intact. Memory: Encoding of 3 words was intact (3/3); short- and long-delayed verbal recall – intact (3/3). LTM was intact for [place] presidents (4/4). Visual memory – intact. Visuospatial: Visuomotor integration – mildly impaired for copy of a 2D figure, sloppiness was noted. Executive Functions: Motor Planning - intact. Organizational skills - intact. Abstract reasoning - intact. Initiation/Phonemic Fluency - mildly impaired. Verbal problem solving – closely intact. Emotional functioning: Affect - mildly constricted, responsive to humor. Mood - euthymic ("good"); Pt reported experiencing poor sleep. On mood measures she additionally reported fatigue, decreased sense of control over her life, anhedonia, dysphoric mood and low energy. Thought processes were mildly circumstantial. No abnormal thought contents were observed.  Pt denied any AH/VH. Pt also denied SI/HI/I/P. Insight - WFL. Judgment - WFL.

## 2021-10-29 NOTE — CHART NOTE - NSCHARTNOTEFT_GEN_A_CORE
Nutrition Follow Up Note  Hospital Course   (Per Electronic Medical Record)    Source:  Patient [X]  Medical Record [X]      Diet:   Regular Diet w/ Thin Liquids & Fluid Restriction DC on 10/25  Tolerates Diet Consistency Well  No Chewing/Swallowing Difficulties  No Recent Nausea, Vomiting, Diarrhea or Constipation (as Per Patient)  Consumes 100% of Meals (as Per Documentation) - States Good PO Intake/Appetite   Education Provided on Proper Nutrition     Enteral/Parenteral Nutrition: Not Applicable    Current Weight: 213.8lb on 10/23  Obtain New Weight  Obtain Weights Weekly     Pertinent Medications: MEDICATIONS  (STANDING):  bisacodyl 5 milliGRAM(s) Oral every 12 hours  buPROPion XL (24-Hour) . 150 milliGRAM(s) Oral daily  enoxaparin Injectable 40 milliGRAM(s) SubCutaneous <User Schedule>  levETIRAcetam 250 milliGRAM(s) Oral two times a day  multivitamin 1 Tablet(s) Oral daily  pantoprazole    Tablet 40 milliGRAM(s) Oral before breakfast  polyethylene glycol 3350 17 Gram(s) Oral every 12 hours  senna 2 Tablet(s) Oral at bedtime  sodium chloride 0.65% Nasal 1 Spray(s) Both Nostrils three times a day  venlafaxine XR. 150 milliGRAM(s) Oral daily    MEDICATIONS  (PRN):  acetaminophen     Tablet .. 650 milliGRAM(s) Oral every 6 hours PRN Temp greater or equal to 38C (100.4F), Mild Pain (1 - 3)  melatonin 3 milliGRAM(s) Oral at bedtime PRN Insomnia    Pertinent Labs:  10-28 Na134 mmol/L<L> Glu 97 mg/dL K+ 4.0 mmol/L Cr  1.03 mg/dL BUN 21 mg/dL 10-28 Alb 2.9 g/dL<L>    Skin: No Pressure Ulcers     Edema: None Noted (as Per Documentation)     Last Bowel Movement: on 10/25    Estimated Needs:   [X] No Change Since Previous Assessment    Previous Nutrition Diagnosis:   Obese    Nutrition Diagnosis is [X] Ongoing - Nutrition Education Provided     New Nutrition Diagnosis: [X] Not Applicable    Interventions:   1. Education Provided on Proper Nutrition   2. Recommend Continue Nutrition Plan of Care     Monitoring & Evaluation:   [X] Weights   [X] PO Intake   [X] Skin Integrity   [X] Follow Up (Per Protocol)  [X] Tolerance to Diet Prescription   [X] Other: Labs     Registered Dietitian/Nutritionist Remains Available.  Won Salgado, MELISSAN    Pager #[phone]  Phone# [phone]
REHABILITATION DIAGNOSIS/IMPAIRMENT GROUP CODE:  TBI due to fall    COMORBIDITIES/COMPLICATING CONDITIONS IMPACTING REHABILITATION:  HEALTH ISSUES - PROBLEM Dx:  BPPV      PAST MEDICAL & SURGICAL HISTORY:  HLD (hyperlipidemia)    H/O ureterostomy    S/p nephrectomy        Based upon consideration of the patient's impairments, functional status, complicating conditions and any other contributing factors and after information garnered from the assessments of all therapy disciplines involved in treating the patient and other pertinent clinicians:    INTERDISCIPLINARY REHABILITATION INTERVENTIONS:    [ X  ] Transfer Training  [ x  ] Bed Mobility  [ x  ] Therapeutic Exercise  [ x  ] Balance/Coordination Exercises  [ x  ] Locomotion retraining  [ x  ] Stairs  [ x  ] Functional Transfer Training  [ x  ] Bowel/Bladder program  [ x  ] Pain Management  [ x  ] Skin/Wound Care  [   ] Visual/Perceptual Training  [   ] Therapeutic Recreation Activities  [ x  ] Neuromuscular Re-education  [ x  ] Activities of Daily Living  [ x  ] Speech Exercise  [   ] Swallowing Exercises  [   ] Vital Stim  [   ] Dietary Supplements  [   ] Calorie Count  [ x  ] Cognitive Exercises  [ x  ] Cognitive/Linguistic Treatment  [   ] Behavior Program  [   ] Neuropsych Therapy  [ x  ] Patient/Family Counseling  [ x  ] Family Training  [   ] Community Re-entry  [   ] Orthotic Evaluation  [   ] Prosthetic Eval/Training    MEDICAL PROGNOSIS:  fair    REHAB POTENTIAL:  fair  EXPECTED DAILY THERAPY:         PT:1 hr/day       OT:1 hr/day       ST:1 hr/day           EXPECTED INTENSITY OF PROGRAM:  3 hrs/day    EXPECTED FREQUENCY OF PROGRAM:  5 days/week     ESTIMATED LOS:  10-12days    ESTIMATED DISPOSITION:  home    INTERDISCIPLINARY FUNCTIONAL OUTCOMES/GOALS:         Gait/Mobility:modified independent       Transfers:modified independent       ADLs:modified independent       Functional Transfers:modified independent       Medication Management:modified independent       Communication:na       Cognitive:modified independent       Dysphagia:modified independent       Bladder:modified independent       Bowel:modified independent

## 2021-10-29 NOTE — DISCHARGE NOTE NURSING/CASE MANAGEMENT/SOCIAL WORK - NSDCCRTYPESERV_GEN_ALL_CORE_FT
You have an evaluation with Tigist Newman on 11/16 at 11:20am. Arrive by 11am to finalize paperwork. Bring your RX, photo ID and insurance card to your first appointment.

## 2021-10-29 NOTE — DISCHARGE NOTE NURSING/CASE MANAGEMENT/SOCIAL WORK - PATIENT PORTAL LINK FT
You can access the FollowMyHealth Patient Portal offered by [facility] by registering at the following website: [email or web address]. By joining [facility]’s FollowMyHealth portal, you will also be able to view your health information using other applications (apps) compatible with our system.

## 2021-10-29 NOTE — PROGRESS NOTE ADULT - ASSESSMENT
67 yo F who was found to have a right frontal traumatic SAH, bifrontal SDH, and left suboccipital skull fracture following a mechanical fall down stairs.     Now admitted to [facility] after for initiation of a multidisciplinary rehab program consisting focused on functional mobility, transfers and ADLs - PT/OT/DVT PPX PAIN MEDS    # Functional and gait instability:  # traumatic SAH/SDH 2/2 mechanical fall  # Suboccipital skull fracture   - C/w PT/OT per primary team  - bowel regimen and pain control per pMR  - keppra sz ppx - PMR weaning off d/t no seizures.  Outpatient f/u w/ Neurosurgeon Dr. Chappell in 1 week from rehab d/c.     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   - agree w/ work up initiated by PMR.  -  TSH wnl. uric acid wnl.   - urine studies point towards SIADH  - agree w/ fluid restriction.     # vertigo  - seen by Neurology during inpatient stay  - meclizine  - outpatient vestibular rehab.      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   - carefully monitor kidney function    # depression  - seen by psychiatry  - pt on wellbutrin and venlafaxine    dvt ppx: lovenox

## 2021-10-30 VITALS
TEMPERATURE: 98 F | RESPIRATION RATE: 16 BRPM | HEART RATE: 73 BPM | DIASTOLIC BLOOD PRESSURE: 76 MMHG | SYSTOLIC BLOOD PRESSURE: 115 MMHG

## 2021-10-30 LAB — SARS-COV-2 RNA SPEC QL NAA+PROBE: SIGNIFICANT CHANGE UP

## 2021-10-30 PROCEDURE — 99232 SBSQ HOSP IP/OBS MODERATE 35: CPT

## 2021-10-30 RX ADMIN — PANTOPRAZOLE SODIUM 40 MILLIGRAM(S): 20 TABLET, DELAYED RELEASE ORAL at 06:17

## 2021-10-30 RX ADMIN — Medication 5 MILLIGRAM(S): at 06:16

## 2021-10-30 RX ADMIN — POLYETHYLENE GLYCOL 3350 17 GRAM(S): 17 POWDER, FOR SOLUTION ORAL at 06:17

## 2021-10-30 RX ADMIN — Medication 1 TABLET(S): at 11:38

## 2021-10-30 RX ADMIN — BUPROPION HYDROCHLORIDE 150 MILLIGRAM(S): 150 TABLET, EXTENDED RELEASE ORAL at 11:38

## 2021-10-30 RX ADMIN — LEVETIRACETAM 250 MILLIGRAM(S): 250 TABLET, FILM COATED ORAL at 06:17

## 2021-10-30 RX ADMIN — Medication 1 SPRAY(S): at 06:19

## 2021-10-30 RX ADMIN — Medication 150 MILLIGRAM(S): at 11:38

## 2021-10-30 NOTE — PROGRESS NOTE ADULT - SUBJECTIVE AND OBJECTIVE BOX
Cc: Gait dysfunction    HPI: Patient with no new medical issues today.  She feels well and is ready for discharge.    Pain controlled, no chest pain, no N/V, no Fevers/Chills. No other new ROS  Has been tolerating rehabilitation program.    acetaminophen     Tablet .. 650 milliGRAM(s) Oral every 6 hours PRN  bisacodyl 5 milliGRAM(s) Oral every 12 hours  buPROPion XL (24-Hour) . 150 milliGRAM(s) Oral daily  enoxaparin Injectable 40 milliGRAM(s) SubCutaneous <User Schedule>  levETIRAcetam 250 milliGRAM(s) Oral two times a day  melatonin 3 milliGRAM(s) Oral at bedtime PRN  multivitamin 1 Tablet(s) Oral daily  pantoprazole    Tablet 40 milliGRAM(s) Oral before breakfast  polyethylene glycol 3350 17 Gram(s) Oral every 12 hours  senna 2 Tablet(s) Oral at bedtime  sodium chloride 0.65% Nasal 1 Spray(s) Both Nostrils three times a day  venlafaxine XR. 150 milliGRAM(s) Oral daily      T(C): 36.7 (10-30-21 @ 07:30), Max: 36.8 (10-29-21 @ 20:02)  HR: 73 (10-30-21 @ 07:30) (72 - 73)  BP: 115/76 (10-30-21 @ 07:30) (115/76 - 130/72)  RR: 16 (10-30-21 @ 07:30) (16 - 16)  SpO2: 98% (10-29-21 @ 20:02) (98% - 98%)    In NAD  HEENT- EOMI  Heart- RRR, S1S2  Lungs- CTA bl.  Abd- + BS, NT  Ext- No calf pain  Neuro- Exam unchanged    Imp: Patient with diagnosis of Right frontal SAH admitted for comprehensive acute rehabilitation.    Plan:  - Continue PT/OT/SLP  - DVT prophylaxis -Lovenox   - Skin- Turn q2h, check skin daily  - Continue current medications; patient medically stable.   -Active issues- No complaints or issues today.  Planning for discharge.

## 2021-10-30 NOTE — CHART NOTE - NSCHARTNOTEFT_GEN_A_CORE
TERTIARY TRAUMA SURVEY  ------------------------------------------------------------------------------------    Date of TTS: 10/18/2021  Time: 6:16 pm   Admit Date:  10/17/2021    HPI:  66F s/p mech fall down stairs, last ASA 3d ago, , pw HA/nausea (concussion sxs), labs wnl, CTH w/ trace R frontal tSAH and L sub occip skull fx, repeat CTH w/ trace inc R frontal tsah, thin bifrontal sdh, and new thin temporal tsah. CTV hypoplastic L TS but open. No other inj. Exam: EOV otherwise neuro intact. GCS 14. (17 Oct 2021 23:18)    Patient states that she is doing well overall. Her primary complaint is that she has a little bit of pain/tenderness in her head which she describes as awareness of her head on the pillow. She is mildly lightheaded and nausea. She has no visual, auditory or olfactory disturbances.     ROS: was negative for vomiting, tinnitus, vertigo, abdominal pain, chest pain, tachycardia, palpitation, numbness/tingling, SOB  Patient endorses nausea, lightheadedness and physical exam findings as below.     INTERVAL EVENTS:   -repeat head CT stable   -smile asymmetry, drooping of left side of her face     PAST MEDICAL & SURGICAL HISTORY:  -HLD (hyperlipidemia)  -H/O ureterostomy  -S/p nephrectomy    FAMILY HISTORY:  ALLERGIES: No Known Allergies    CURRENT MEDICATIONS  acetaminophen   Tablet .. 650 milliGRAM(s) Oral every 6 hours PRN  influenza   Vaccine 0.5 milliLiter(s) IntraMuscular once  levETIRAcetam 500 milliGRAM(s) Oral every 12 hours  multivitamin 1 Tablet(s) Oral daily  ondansetron Injectable 4 milliGRAM(s) IV Push every 6 hours PRN  oxyCODONE    IR 5 milliGRAM(s) Oral every 4 hours PRN  oxyCODONE    IR 10 milliGRAM(s) Oral every 4 hours PRN  pantoprazole    Tablet 40 milliGRAM(s) Oral before breakfast  polyethylene glycol 3350 17 Gram(s) Oral every 12 hours  senna 2 Tablet(s) Oral at bedtime PRN  sodium chloride 0.9%. 1000 milliLiter(s) IV Continuous <Continuous>  venlafaxine XR. 150 milliGRAM(s) Oral daily    -----------------------------------------------------------------------------------    VITAL SIGNS:  T(C): 36.8 (10-18-21 @ 17:21), Max: 36.9 (10-17-21 @ 23:15)  HR: 84 (10-18-21 @ 17:21) (74 - 88)  BP: 110/68 (10-18-21 @ 17:21)  RR: 17 (10-18-21 @ 17:21) (15 - 18)  SpO2: 98% (10-18-21 @ 17:21) (95% - 99%)    Weight (kg): 92 (10-17-21 @ 23:15)    PHYSICAL EXAM:      General: AAOx3 in NAD     HEENT: Patienti has a focal area of tenderness on the LEFT - posterolateral tenderness. She says it does not hurt unless it is being touched. Facial Smile/Lip asymmetry from the philtrum to the oral commissure such that that the left vermillion border has decreased height compared to the right and the left oral commissure projects down versus the right which projects laterally. Normal inspection of eyes and nose; Moist mucous membranes, no oral lesions    Neck: Soft, supple, full ROM. No cervical or paraspinal tenderness.     Cardio: RRR.     Chest: Good effort, CTAB. No chest wall tenderness.    GI/Abd: Soft, NT/ND.    Vascular: Extremities warm; B/L UE and LE pulses 2+    Skin: No rashes; Normal color    Musculoskeletal: All 4 extremities moving spontaneously, no limitations. Full ROM of shoulders, elbows, wrists, fingers, knees, ankles bilaterally. No tenderness to palpation of joints or extremities.    Neuro: Strength 5/5 in B/L UE/LE. Sensation to light touch intact in B/L UE/LE.                CRANIAL NERVES: I - olfactory intact. II - normal visual acuity testing with Snellen. III/IV/VI - EOM's intact, painless. V - Normal sensation throughout 3 branches. VII - Normal and symmetric eyebrow raise; cheek puff symmetric; normal and symmetric smile; Normal strength with eye closing b/l. Patient able to puff out cheeks without leakage. VIII - Hearing intact to whisper. IX/X - Normal palate rise, + gag reflex. XI - normal shoulder shrug, neck flexion & lateral rotation. XII - Normal and symmetric tongue protrusion.      LABS:    LABS:             13.7   12.84 )-----------( 251      ( 18 Oct 2021 07:08 )             41.7     136  |  100  |  18  ----------------------------<  118<H>  4.3   |  23  |  1.13    Ca    9.5      18 Oct 2021 07:08  TPro  7.2  /  Alb  4.3  /  TBili  0.7  /  DBili  x   /  AST  18  /  ALT  13  /  AlkPhos  100  10-18  PT/INR - ( 17 Oct 2021 17:38 )   PT: 12.4 sec;   INR: 1.04 ratio    PTT - ( 17 Oct 2021 17:38 )  PTT:29.7 sec    ------------------------------------------------------------------------------------------  RADIOLOGICAL FINDINGS REVIEW:    CXR:  EXAM:  XR CHEST AP OR PA 1V                          PROCEDURE DATE:  10/17/2021      INTERPRETATION:  EXAMINATION: XR CHEST    CLINICAL INDICATION: Trauma Code    TECHNIQUE: Single frontal, portable view of the chest was obtained. Metallic artifacts from a bra projects over the image. A skin fold projects over the image.    COMPARISON: CTA of the chest performed earlier the same day.    FINDINGS:  The patient slightly rotated into an CONSTANTINO projection.  Heart size and the mediastinum cannot be accurately evaluated on this projection.  There are low lung volumes. The lungs are clear.  There is no pneumothorax or pleural effusion.  No acute displaced fracture seen. There is osteoarthritic degenerative change of the spine.    IMPRESSION:  Low lung volumes. Clear lungs.    No acute displaced fracture seen.    Pelvis Films:    EXAM:  XR PELVIS AP ONLY 1-2 VIEWS                          PROCEDURE DATE:  10/17/2021      INTERPRETATION:  CLINICAL INFORMATION: Trauma code    TECHNIQUE: Single AP view of the pelvis    COMPARISON: None available    IMPRESSION:    Contrast material noted within the bladder.    No acute displaced fracture within the pelvis. Minimal bilateral hip arthrosis. Mineralization overlying the greater trochanter of the right femur, which may be enthesopathic or be related to calcific tendinitis.    Head CT and C-Spine CT and Brain CTV:   EXAM:  CT CERVICAL SPINE                          EXAM:  CT BRAIN                            PROCEDURE DATE:  10/17/2021            INTERPRETATION:  CLINICAL INDICATION: Trauma code. Fall down 6 steps with loss of consciousness for 1 minute with residual nausea.    TECHNIQUE:  Noncontrast CT scan of the head and cervical spine was performed.  Thin section axial images were obtained through the cervical spine.  Sagittal and coronal reformations were created.    COMPARISON: None available.    FINDINGS:    BRAIN:    There are small foci of hyperattenuation in the right frontal convexity measuring up to 4 cm compatible with small traumatic subarachnoid hemorrhage (2:22). No acute intraparenchymal hemorrhage, mass effect, or midline shift. Age appropriate involutional changes and mild small vessel white matter changes.No hydrocephalus.    PARANASAL SINUSES: Within normal limits.  TYMPANOMASTOID CAVITIES: Within normal limits.  ORBITS: Right intraocular lens replacement.  CALVARIUM: Acute nondisplaced fracture of the left left suboccipital calvarium  MISCELLANEOUS: Left posterior parieto-occipital subcutaneous hematoma.    CERVICAL SPINE:  ALIGNMENT: Straightening of the cervical lordosis.  BONES: No acute fracture or subluxation.  DISCS: Multilevel degenerative changes characterized by intervertebral disc height loss most notably at C5-C7 with small disc bulges, marginal osteophyte formation, and uncovertebral and facet joint arthrosis. No significant spinal canal or foraminal narrowing at CT.  NECK SOFT TISSUES: No prevertebral soft tissue swelling.  VISUALIZED LUNGS: Within normal limits.    IMPRESSION:    Head CT: Acute subarachnoid hemorrhage in the right frontal convexity with a acute nondisplaced left suboccipital calvarial fracture. Left parieto-occipital extracalvarial soft tissue swelling    Cervical spine CT: No acute fracture or subluxation.    EXAM:  CT BRAIN                            PROCEDURE DATE:  10/17/2021            INTERPRETATION:  NONCONTRAST CT OF THE BRAIN DATED 10/17/2021.    CLINICAL INFORMATION:  Follow-up subarachnoid hemorrhage.    TECHNIQUE: Axial CT images are obtained from the cranial vertex to the skull base without the administration of IV contrast. Images are reformatted in sagittal and coronal planes.    The study is correlated with a prior exam from 10/17/2021 at 5:48 PM.    FINDINGS:    There is mild interval increase in right temporal and frontal subarachnoid hemorrhage and more conspicuous thin bilateral frontal convexity and right parietal convexity subdural hematomas measuring up to 4 mm on the left and 5 mm on the right. There is no significant mass effect or shift of the midline. The basal cisterns are not effaced. There is mild cerebral volume loss with prominence of the ventricles and sulci. There are mild chronic ischemic changes in the frontoparietal white matter. There are atherosclerotic calcifications of the intracranial carotid arteries.    There is a large left posterior parietal scalp hematoma. Nondisplaced fracture extending from the left parietal bone through the left occipital bone to the left occipital condyle is unchanged compared with prior exam. The visualized paranasal sinuses and tympanic/mastoid cavities are clear apart from minimal bilateral ethmoid mucosal thickening. There is evidence of right lens surgery.    IMPRESSION:    Mild interval increase in right temporal and frontal subarachnoid hemorrhage and more conspicuous thin bilateral frontal convexity and right parietal convexity subdural hematomas measuring up to 4 mm on the left and 5 mm on the right. No significant mass effect or shift of the midline.    Large left posterior parietal scalp hematoma. Nondisplaced fracture extending from the left parietal bone through the left occipital bone to the left occipital condyle, unchanged.  EXAM:  CT BRAIN                          PROCEDURE DATE:  10/18/2021      INTERPRETATION:  .Noncontrast CT of the brain.    CLINICAL INDICATION:  Bifrontal subdural hemorrhage, follow-up    TECHNIQUE : Axial CT scanning of the brain was obtained from the skull base to the vertex without the administration of intravenous contrast. Sagittal and coronal reformats were provided.    COMPARISON: CT brain 10/17/2020 obtained at 10:42 AM    FINDINGS:    Similar-appearing thin bifrontal mixed density subdural collections.    Similar minimal right-sided sulcal subarachnoid hemorrhage.    No midline shift or effacement of the basal cisterns. No hydrocephalus.    No parenchymal hemorrhage or brain edema.    No displaced calvarial fracture.    The visualized paranasal sinuses and mastoid air cells are clear.    IMPRESSION:    No significant interval change    EXAM:  CT VENOGRAM BRAIN (W)AW IC                            PROCEDURE DATE:  10/17/2021      INTERPRETATION:  BRAIN CTV:  INDICATIONS:  Subarachnoid hemorrhage. Skull fracture    TECHNIQUE: CT venography was performed using delayed contiguous thin section imaging following a contrast bolus. 90 cc Omnipaque 350 were administered 10 cc were discarded.    COMPARISON: Noncontrast CT head from same day.    FINDINGS:  Subarachnoid and subdural hematomas better evaluated on noncontrast CT from earlier same day, difficult to compare due to administered contrast:  No mass effect, midline shift or downward herniation.    Nondisplaced fracture extending from the left parietal bone through the left occipital bone to the left occipital condyle, unchanged.  Large left posterior parietal scalp hematoma.      SUPERIOR SAGITTAL SINUS: Patent.  RIGHT TRANSVERSE AND SIGMOID SINUS: Patent.  LEFT TRANSVERSE AND SIGMOID SINUS: Patent.  INTERNAL JUGULAR VEINS: Patent.  INTERNAL CEREBRAL VEINS: Patent.  VEIN OF ALEXX: Patent.  STRAIGHT SINUS: Patent.    IMPRESSION: No evidence of venous sinus thrombosis.  Subarachnoid and subdural hematomas better evaluated on noncontrast CT from earlier same day, difficult to compare due to administered contrast    CT cervical spine:   INTERPRETATION:  CLINICAL INDICATION: Trauma code. Fall down 6 steps with loss of consciousness for 1 minute with residual nausea.    TECHNIQUE:  Noncontrast CT scan of the head and cervical spine was performed.  Thin section axial images were obtained through the cervical spine.  Sagittal and coronal reformations were created.    COMPARISON: None available.    There are small foci of hyperattenuation in the right frontal convexity measuring up to 4 cm compatible with small traumatic subarachnoid hemorrhage (2:22). No acute intraparenchymal hemorrhage, mass effect, or midline shift. Age appropriate involutional changes and mild small vessel white matter changes.No hydrocephalus.    PARANASAL SINUSES: Within normal limits.  TYMPANOMASTOID CAVITIES: Within normal limits.  ORBITS: Right intraocular lens replacement.  CALVARIUM: Acute nondisplaced fracture of the left left suboccipital calvarium  MISCELLANEOUS: Left posterior parieto-occipital subcutaneous hematoma.    CERVICAL SPINE:  ALIGNMENT: Straightening of the cervical lordosis.  BONES: No acute fracture or subluxation.  DISCS: Multilevel degenerative changes characterized by intervertebral disc height loss most notably at C5-C7 with small disc bulges, marginal osteophyte formation, and uncovertebral and facet joint arthrosis. No significant spinal canal or foraminal narrowing at CT.  NECK SOFT TISSUES: No prevertebral soft tissue swelling.  VISUALIZED LUNGS: Within normal limits.    IMPRESSION:    Head CT: Acute subarachnoid hemorrhage in the right frontal convexity with a acute nondisplaced left suboccipital calvarial fracture. Left parieto-occipital extracalvarial soft tissue swelling    Cervical spine CT: No acute fracture or subluxation.  Chest CT ABD/Pelvis CT:   EXAM:  CT ABDOMEN AND PELVIS IC                          EXAM:  CT CHEST IC                            PROCEDURE DATE:  10/17/2021      INTERPRETATION:  CLINICAL INFORMATION: Trauma code, fall down 6 steps with loss of consciousness.    COMPARISON: None.    CONTRAST/COMPLICATIONS:  IV Contrast: Omnipaque 350. 90 cc administered. 10 cc discarded.  Oral Contrast: None.  Complications: None documented.    PROCEDURE:  CT of the Chest, Abdomen and Pelvis was performed.  Imaging was performed through the chest in the arterial phase followed by imaging of the abdomen and pelvis in the portal venous phase.  Sagittal and coronal reformats were performed.    FINDINGS:  CHEST:  LUNGS AND LARGE AIRWAYS: Patent central airways. Trace bibasilar subsegmental atelectasis.  PLEURA: No pleural effusion.  VESSELS: Within normal limits.  HEART: Heart size is normal. No pericardial effusion.  MEDIASTINUM AND BRYCE: No lymphadenopathy.  CHEST WALL AND LOWER NECK: Within normal limits.    ABDOMEN AND PELVIS:  LIVER: Subcentimeter hypoattenuating focus on the hepatic dome which is too small to characterize.  BILE DUCTS: Normal caliber.  GALLBLADDER: Within normal limits.  SPLEEN: Within normal limits.  PANCREAS: Within normal limits.  ADRENALS: Within normal limits.  KIDNEYS/URETERS: Status post right nephrectomy. Left renal cyst and additional subcentimeter hypoattenuating foci which are too small to characterize.    BLADDER: Within normal limits.  REPRODUCTIVE ORGANS: Uterus and adnexa within normal limits.    BOWEL: No bowel obstruction.  PERITONEUM: No ascites.  VESSELS: Atherosclerotic changes.  RETROPERITONEUM/LYMPH NODES: No lymphadenopathy.  ABDOMINAL WALL: Small fat-containing umbilical hernia.  BONES: Degenerative changes.    IMPRESSION:  No acute intrathoracic or intra-abdominal/pelvic pathology.    List Injuries Identified to Date:    Suspected pulmonary embolism  Suspected deep vein thrombosis (DVT)  SAH (subarachnoid hemorrhage)  Orthostatic hypotension  Depression, unspecified    List Operative and Interventional Radiological Procedures:     Consults (Date):  [X] Neurosurgery (10/18/2021): non-operative management - repeat ct head stable    INTERPRETATION/ASSESSMENT:   ROSETTA LOWRY is a 66y Female who required a tertiary survey due to mechanical fall downstairs.    PLAN: per Neurosurgery   - Discuss lip symmetry with neurosurgery  - Q4 hour neuro checks  - Repeat ct head stable  - Oxycodone for pain control  - Continue effexor and wellbutrin  - Out of bed with assistance  - PT/OT  -Acute rehab upon discharge
Addendum to 10/22 note.    Patient is on effexor and wellbutrin for chronic recurrent depression.

## 2021-10-30 NOTE — PROGRESS NOTE ADULT - PROVIDER SPECIALTY LIST ADULT
Rehab Medicine
Hospitalist
Hospitalist
Rehab Medicine
Rehab Medicine
Hospitalist
Physiatry
Physiatry
Hospitalist
Hospitalist
Physiatry

## 2021-10-30 NOTE — PROGRESS NOTE ADULT - REASON FOR ADMISSION
SAH, ADH, and suboccipital skull fracture after mechanical fall

## 2021-10-30 NOTE — PROGRESS NOTE ADULT - ASSESSMENT
B/L frontal SDH/SAH and Calvarial fx sec to fall  PT/OT per rehab  Keppra for sz ppx    Vertigo  Meclizine prn    Dep  Effexor/Wellbutrin    Hyponatremia sec to SIADH  NA stable  Fluid restriction    DVT ppx  Lovenox

## 2021-10-30 NOTE — PROGRESS NOTE ADULT - SUBJECTIVE AND OBJECTIVE BOX
HPI:  A 66 year old female presented with headache and nausea s/p mechanical fall down stairs. Patient took last ASA 3 days ago. ARU upon admission was 632. Patient was admitted to neurosurgery on 10/17/2021 for R frontal tSAH, bifrontal sdh, and Lt suboccipital skull fracture. 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 Serial CT scans of head were performed during this admission with the last stable one on 10/20. Hospitalist consult was appreciated. Patient was advised to have RASHEEDA stocking and hydration for orthostatic hypotension. Psychiatry consult was appreciated and adjusted her anti-depressant giving her dizziness/vertigo after fall. Neurology and ENT consults were also appreciated for vertigo. Patient was started on meclizine as needed for dizziness and recommended with vestibular rehab.     Patient was medically stable for discharge to  for AC 10/23/21. (23 Oct 2021 15:00)      Subjective    No new complaints      PAST MEDICAL & SURGICAL HISTORY:  HLD (hyperlipidemia)    H/O ureterostomy    S/p nephrectomy        MedsMEDICATIONS  (STANDING):  bisacodyl 5 milliGRAM(s) Oral every 12 hours  buPROPion XL (24-Hour) . 150 milliGRAM(s) Oral daily  enoxaparin Injectable 40 milliGRAM(s) SubCutaneous <User Schedule>  levETIRAcetam 250 milliGRAM(s) Oral two times a day  multivitamin 1 Tablet(s) Oral daily  pantoprazole    Tablet 40 milliGRAM(s) Oral before breakfast  polyethylene glycol 3350 17 Gram(s) Oral every 12 hours  senna 2 Tablet(s) Oral at bedtime  sodium chloride 0.65% Nasal 1 Spray(s) Both Nostrils three times a day  venlafaxine XR. 150 milliGRAM(s) Oral daily    MEDICATIONS  (PRN):  acetaminophen     Tablet .. 650 milliGRAM(s) Oral every 6 hours PRN Temp greater or equal to 38C (100.4F), Mild Pain (1 - 3)  melatonin 3 milliGRAM(s) Oral at bedtime PRN Insomnia      Vital Signs Last 24 Hrs  T(C): 36.7 (30 Oct 2021 07:30), Max: 36.8 (29 Oct 2021 20:02)  T(F): 98.1 (30 Oct 2021 07:30), Max: 98.3 (29 Oct 2021 20:02)  HR: 73 (30 Oct 2021 07:30) (72 - 73)  BP: 115/76 (30 Oct 2021 07:30) (115/76 - 130/72)  BP(mean): --  RR: 16 (30 Oct 2021 07:30) (16 - 16)  SpO2: 98% (29 Oct 2021 20:02) (98% - 98%)  I&O's Summary      PHYSICAL EXAM:  GENERAL: NAD  NECK: Supple  NERVOUS SYSTEM:  awake and alert  HEART: S1s2 NL , RRR  CHEST/LUNG: Clear to percussion bilaterally  ABDOMEN: Soft, Nontender, Nondistended; Bowel sounds present  EXTREMITIES:  No edema      LABS:              RVP:          Tox:           CAPILLARY BLOOD GLUCOSE          Imaging Personally Reviewed:  [ ] YES  [ ] NO        Care Discussed with Consultants/Other Providers [ x] YES  [ ] NO

## 2021-11-05 PROBLEM — K21.9 GASTRO-ESOPHAGEAL REFLUX DISEASE WITHOUT ESOPHAGITIS: Chronic | Status: ACTIVE | Noted: 2019-09-17

## 2021-11-05 PROBLEM — L65.9 NONSCARRING HAIR LOSS, UNSPECIFIED: Chronic | Status: ACTIVE | Noted: 2019-09-17

## 2021-11-05 PROBLEM — F32.9 MAJOR DEPRESSIVE DISORDER, SINGLE EPISODE, UNSPECIFIED: Chronic | Status: ACTIVE | Noted: 2019-09-17

## 2021-11-24 ENCOUNTER — APPOINTMENT (OUTPATIENT)
Dept: NEUROSURGERY | Facility: CLINIC | Age: 66
End: 2021-11-24
Payer: COMMERCIAL

## 2021-11-24 VITALS
OXYGEN SATURATION: 100 % | DIASTOLIC BLOOD PRESSURE: 68 MMHG | HEART RATE: 76 BPM | SYSTOLIC BLOOD PRESSURE: 134 MMHG | HEIGHT: 68 IN | WEIGHT: 200 LBS | BODY MASS INDEX: 30.31 KG/M2 | TEMPERATURE: 97.2 F

## 2021-11-24 PROCEDURE — 99215 OFFICE O/P EST HI 40 MIN: CPT

## 2021-11-26 NOTE — PHYSICAL EXAM
[General Appearance - Alert] : alert [General Appearance - In No Acute Distress] : in no acute distress [General Appearance - Well Nourished] : well nourished [General Appearance - Well-Appearing] : healthy appearing [Oriented To Time, Place, And Person] : oriented to person, place, and time [Impaired Insight] : insight and judgment were intact [Affect] : the affect was normal [Memory Recent] : recent memory was not impaired [Person] : oriented to person [Place] : oriented to place [Time] : oriented to time [Short Term Intact] : short term memory intact [Cranial Nerves Optic (II)] : visual acuity intact bilaterally,  pupils equal round and reactive to light [Cranial Nerves Oculomotor (III)] : extraocular motion intact [Cranial Nerves Trigeminal (V)] : facial sensation intact symmetrically [Cranial Nerves Facial (VII)] : face symmetrical [Cranial Nerves Vestibulocochlear (VIII)] : hearing was intact bilaterally [Cranial Nerves Glossopharyngeal (IX)] : tongue and palate midline [Cranial Nerves Accessory (XI - Cranial And Spinal)] : head turning and shoulder shrug symmetric [Cranial Nerves Hypoglossal (XII)] : there was no tongue deviation with protrusion [Motor Tone] : muscle tone was normal in all four extremities [Motor Strength] : muscle strength was normal in all four extremities [Sensation Tactile Decrease] : light touch was intact [Sensation Pain / Temperature Decrease] : pain and temperature was intact [Balance] : balance was intact [Sclera] : the sclera and conjunctiva were normal [PERRL With Normal Accommodation] : pupils were equal in size, round, reactive to light, with normal accommodation [Outer Ear] : the ears and nose were normal in appearance [Both Tympanic Membranes Were Examined] : both tympanic membranes were normal [Neck Appearance] : the appearance of the neck was normal [] : no respiratory distress [Respiration, Rhythm And Depth] : normal respiratory rhythm and effort [Apical Impulse] : the apical impulse was normal [Heart Rate And Rhythm] : heart rate was normal and rhythm regular [No Spinal Tenderness] : no spinal tenderness [Abnormal Walk] : normal gait [Involuntary Movements] : no involuntary movements were seen

## 2021-11-29 ENCOUNTER — APPOINTMENT (OUTPATIENT)
Dept: PHYSICAL MEDICINE AND REHAB | Facility: CLINIC | Age: 66
End: 2021-11-29
Payer: COMMERCIAL

## 2021-11-29 ENCOUNTER — NON-APPOINTMENT (OUTPATIENT)
Age: 66
End: 2021-11-29

## 2021-11-29 VITALS
DIASTOLIC BLOOD PRESSURE: 78 MMHG | RESPIRATION RATE: 16 BRPM | WEIGHT: 206 LBS | HEART RATE: 107 BPM | SYSTOLIC BLOOD PRESSURE: 109 MMHG | TEMPERATURE: 97.2 F | HEIGHT: 68 IN | BODY MASS INDEX: 31.22 KG/M2 | OXYGEN SATURATION: 99 %

## 2021-11-29 PROCEDURE — 99213 OFFICE O/P EST LOW 20 MIN: CPT

## 2021-11-29 RX ORDER — VENLAFAXINE HCL 75 MG
75 TABLET ORAL
Refills: 0 | Status: ACTIVE | COMMUNITY

## 2021-11-29 NOTE — ASSESSMENT
[FreeTextEntry1] : Ms. Luigi Culver is a 66 year old female with history of vertigo, was admitted to rehab for impairments secondary to fall resulting in Traumatic subarachnoid bleed, subdural bleed and left suboccipital bone fracture. While in rehab she was also evaluated by vestibular therapist and treated for BPPV\par \par She has made good recovery \par No further recommendations at this time\par She will follow up with neurology and NS as scheduled.

## 2021-11-29 NOTE — PHYSICAL EXAM
[Normal] : Alert and in no acute distress [de-identified] : OMKAR [de-identified] : AAOX3,  no aphasia or dysarthria, Recall intact, easily distracted, EOMI, no nystagmus. Np facial weakness. motor, sensory, coordination intact, Gait normal. Had difficulty with tandem walking

## 2021-11-29 NOTE — REVIEW OF SYSTEMS
[Fatigue] : fatigue [Eye Pain] : no eye pain [Earache] : no earache [Chest Pain] : no chest pain [Dizziness] : no dizziness [Difficulty Walking] : no difficulty walking

## 2021-11-29 NOTE — REASON FOR VISIT
[Post Hospitalization] : a post hospitalization visit [FreeTextEntry1] : SAH, ADH, and suboccipital skull fracture after mechanical  fall

## 2021-11-29 NOTE — HISTORY OF PRESENT ILLNESS
[FreeTextEntry1] : Ms. Culver is a 66 year old female seen in follow up after recent hospital and rehab stay secondary to a fall with TBI (TSAH, bifrontal SDH, left suboccipital skull fracture. Since discharge from rehab she had a course of home therapy and states that she feels well.She denies any dizziness\par She has seen her neurologist who ordered MRI Brain. She also states that  a MRI of C- spine has been ordered.\par She reports minimal headache, mid parietal bone region. She also reports fatigue along with fragmented sleep. However she states that she stays awake to watch TV shows. \par She feels that her vision is impaired and has a ophthalmology appointment. \par She denies any more falls. While in rehab she was diagnosed with right BPPV and treated for the same. \par She has been cleared for driving.

## 2021-12-08 ENCOUNTER — APPOINTMENT (OUTPATIENT)
Dept: OTOLARYNGOLOGY | Facility: CLINIC | Age: 66
End: 2021-12-08
Payer: COMMERCIAL

## 2021-12-08 VITALS
HEIGHT: 68 IN | BODY MASS INDEX: 31.22 KG/M2 | WEIGHT: 206 LBS | SYSTOLIC BLOOD PRESSURE: 125 MMHG | DIASTOLIC BLOOD PRESSURE: 79 MMHG | TEMPERATURE: 98 F | HEART RATE: 88 BPM

## 2021-12-08 DIAGNOSIS — R42 DIZZINESS AND GIDDINESS: ICD-10-CM

## 2021-12-08 DIAGNOSIS — S06.6X0D TRAUMATIC SUBARACHNOID HEMORRHAGE W/OUT LOSS OF CONSCIOUSNESS, SUBSEQUENT ENCOUNTER: ICD-10-CM

## 2021-12-08 DIAGNOSIS — R51.9 HEADACHE, UNSPECIFIED: ICD-10-CM

## 2021-12-08 PROCEDURE — 99204 OFFICE O/P NEW MOD 45 MIN: CPT | Mod: 25

## 2021-12-08 PROCEDURE — 31231 NASAL ENDOSCOPY DX: CPT

## 2021-12-08 PROCEDURE — 92557 COMPREHENSIVE HEARING TEST: CPT

## 2021-12-08 PROCEDURE — 92567 TYMPANOMETRY: CPT

## 2021-12-08 NOTE — PROCEDURE
[FreeTextEntry6] : Anterior Rhinoscopy insufficient to account for symptoms.\par \par After informed verbal consent is obtained, the fiberoptic nasal endoscope #21 is passed via the right nasal cavity.\par The following anatomic sites were directly examined in a sequential fashion:\par The scope was introduced in both  nasal passages between the middle and inferior turbinates to exam the inferior portion of the middle meatus and the fontanelle, as well as the maxillary ostia.  Next, the scope was passed medially and posteriorly to the middle turbinates to examine the sphenoethmoid recess and the superior turbinate region.\par  \par \par   There is [ 0 ]% obstruction of the nasopharynx with adenoid tissue.\par \par A right deviated nasal septum was noted causing obstruction.\par The turbinates were congested-bilateral.\par \par Right Side:\par ·               Mucosa: wnl\par ·               Mucous: none\par ·               Polyp: none\par ·               Inferior Turbinate: sl congested\par ·               Middle Turbinate:sl congested\par ·               Superior Turbinate: wnl\par ·               Inferior Meatus:clear\par ·               Middle Meatus: clear\par ·               Super Meatus: clear\par ·               Sphenoethmoidal Recess: wnl\par \par \par \par Left Side:\par ·               Mucosa: wnl\par ·               Mucous:none\par ·               Polyp: none\par ·               Inferior Turbinate: sl congested\par ·               Middle Turbinate: sl congested\par ·               Superior Turbinate:wnl\par ·               Inferior Meatus: clear\par ·               Middle Meatus: clear\par ·               Super Meatus:clear\par ·               Sphenoethmoidal Recess: wnl\par \par

## 2021-12-08 NOTE — ASSESSMENT
[FreeTextEntry1] : IMPRESSION:\par \par A 66 year old female presented with headache and nausea s/p mechanical fall was on  ASA prior. Patient was admitted to neurosurgery on 10/17/2021 for R frontal TSAH, bifrontal sdh, and Lt suboccipital skull fracture. Pt had vertigo during hospitalization, was started on meclizine as needed for dizziness and recommended with vestibular rehab. Pt returned for follow up, had a recent MRI head in [address] by her neurologist. Report not discussed yet.\par   \par \par PLAN:\par \par F/U in 1 month\par Will review image from yesterday done elsewhere, pt to drop off CD and report.\par Will let her know if she can travel after reviewing imaging. \par F/U imaging depends on the last report. \par

## 2021-12-08 NOTE — HISTORY OF PRESENT ILLNESS
[No] : patient does not have a  history of radiation therapy [de-identified] : 67 yo female\par Patient complains she was experiencing vertigo for about 3 months. She would feel dizzy when she would get out of bed then 2 months ago she fell down a flight of stairs hit her head and dizziness worsened. She was sent to Vestibular rehab had the Epley maneuver done and she felt better after that. Has not had a dizzy spell since. She has hx of sinus headaches and feels off balance when with headache. Complains of decreased hearing x several moths. Pt has no ear pain, ear drainage, tinnitus,nasal congestion, nasal discharge, epistaxis, sinus infections, facial pain, facial pressure, throat pain, dysphagia or fevers\par \par  [Hearing Loss] : hearing loss [Vertigo] : vertigo [Dizziness] : dizziness [Presbycusis] : presbycusis [Eustachian Tube Dysfunction] : no eustachian tube dysfunction [Cholesteatoma] : no cholesteatoma [Early Onset Hearing Loss] : no early onset hearing loss [Stroke] : no stroke [Allergic Rhinitis] : no allergic rhinitis [Adenoidectomy] : no adenoidectomy [Allergies] : no allergies [Asthma] : no asthma [Hyperthyroidism] : no hyperthyroidism [Sialadenitis] : no sialadenitis [Hodgkin Disease] : no hodgkin disease [Non-Hodgkin Lymphoma] : no non-hodgkin lymphoma [None] : No risk factors have been identified. [Graves Disease] : no graves disease [Thyroid Cancer] : no thyroid cancer

## 2021-12-08 NOTE — ASSESSMENT
[FreeTextEntry1] : Patient with hx of head trauma complains of vertigo that worsened after she fell and hit her head. Resolved with Epley Maneuver and vestibular rehab. Gets sinus headaches and feels off balance when with headaches. 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:\par -[place] head exercise handout given \par -improving with vestibular rehab \par \par DNS and Rhinitis\par -Rx: Steam humidification \par -Hypertonic saline nasal irrigations \par \par Bilateral SNHL:\par -cleared for hearing aids\par -Hearing Test performed to evaluate the extent of hearing loss and to explain pt's symptoms\par \par Wax:\par -Cerumen is removed from the right and left  ear canal with a curette and suction.\par -Rx:Debrox be placed in both ears on a routine basis to keep them free of wax.\par -Routine debridement suggested to keep the ears free of wax.\par \par f/u prn \par \par

## 2021-12-08 NOTE — DATA REVIEWED
[de-identified] : Hearing Test performed to evaluate the extent of hearing loss and  to explain pt's symptoms\par today's hearing test was personally reviewed and revealed\par mild/mod HF SNHL AU

## 2021-12-08 NOTE — HISTORY OF PRESENT ILLNESS
[de-identified] : A 66 year old female presented with headache and nausea s/p mechanical fall  down stairs. Patient took last ASA 3 days ago. Patient was admitted to neurosurgery on 10/17/2021 for R frontal tSAH,  bifrontal sdh, and Lt suboccipital skull fracture. 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 Serial CT scans of head were performed during this admission with the last stable one on 10/20. Hospitalist consult was appreciated. Patient was advised to have RASHEEDA stocking and hydration for orthostatic hypotension. Psychiatry consult was appreciated and adjusted her anti-depressant giving her dizziness/vertigo after fall. Neurology and ENT \par consults were also appreciated for vertigo. Patient was started on meclizine as needed for dizziness and recommended with vestibular rehab. \par Patient was medically stable for discharge to  for AC 10/23/21. \par  [FreeTextEntry1] : A 66 year old female presented with headache and nausea s/p mechanical fall down stairs. Patient was admitted to neurosurgery on 10/17/2021 for R frontal tSAH, bifrontal sdh, and Lt suboccipital skull fracture. 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 Serial CT scans of head were performed during this admission with the last stable one on 10/20. Patient was advised to have RASHEEDA stocking and hydration for orthostatic hypotension. Psychiatry consult was done and  adjusted her anti-depressant giving her dizziness/vertigo after fall. Neurology and ENT consults were also done for vertigo. Patient was started on meclizine as needed for dizziness and recommended with vestibular rehab. Patient was medically stable and was discharged on 10/23/21. \par \par Today Ms Culver present ambulatory believes that she improved over all. Denies headache, speech impairment, vision problems or seizures. Her dizziness and vertigo was largely resolved. She is not taking any medications now other than  Effexor. She is doing quite well with no specific symptoms. \par \par

## 2021-12-08 NOTE — END OF VISIT
[FreeTextEntry3] : I personally saw and examined ROSETTA LOWRY in detail. I spoke to ROSALIE Gilbert regarding the assessment and plan of care. I reviewed the above assessment and plan of care, and agree. I have made changes in changes in the body of the note where appropriate.I personally reviewed the HPI, PMH, FH, SH, ROS and medications/allergies. I have spoken to ROSALIE Gilbert regarding the history and have personally determined the assessment and plan of care, and documented this myself. I was present and participated in all key portions of the encounter and all procedures noted above. I have made changes in the body of the note where appropriate.\par \par Attesting Faculty: See Attending Signature Below \par \par \par  [Time Spent: ___ minutes] : I have spent [unfilled] minutes of time on the encounter.

## 2021-12-08 NOTE — HISTORY OF PRESENT ILLNESS
[de-identified] : A 66 year old female presented with headache and nausea s/p mechanical fall  down stairs. Patient took last ASA 3 days ago. Patient was admitted to neurosurgery on 10/17/2021 for R frontal tSAH,  bifrontal sdh, and Lt suboccipital skull fracture. 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 Serial CT scans of head were performed during this admission with the last stable one on 10/20. Hospitalist consult was appreciated. Patient was advised to have RASHEEDA stocking and hydration for orthostatic hypotension. Psychiatry consult was appreciated and adjusted her anti-depressant giving her dizziness/vertigo after fall. Neurology and ENT \par consults were also appreciated for vertigo. Patient was started on meclizine as needed for dizziness and recommended with vestibular rehab. \par Patient was medically stable for discharge to  for AC 10/23/21. \par  [FreeTextEntry1] : A 66 year old female presented with headache and nausea s/p mechanical fall down stairs. Patient was admitted to neurosurgery on 10/17/2021 for R frontal tSAH, bifrontal sdh, and Lt suboccipital skull fracture. 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 Serial CT scans of head were performed during this admission with the last stable one on 10/20. Patient was advised to have RASHEEDA stocking and hydration for orthostatic hypotension. Psychiatry consult was done and  adjusted her anti-depressant giving her dizziness/vertigo after fall. Neurology and ENT consults were also done for vertigo. Patient was started on meclizine as needed for dizziness and recommended with vestibular rehab. Patient was medically stable and was discharged on 10/23/21. \par \par Today Ms Culver present ambulatory believes that she improved over all. Denies headache, speech impairment, vision problems or seizures. Her dizziness and vertigo was largely resolved. She is not taking any medications now other than  Effexor. She is doing quite well with no specific symptoms. \par \par

## 2021-12-08 NOTE — PHYSICAL EXAM
[Midline] : trachea located in midline position [Normal] : gait was normal [Nystagmus] : ~T no ~M nystagmus was seen [Fukuda Step Test] : Fukuda Step Test was Negative [Ingris-Charlyke] : Sublette-Hallpike: Positive [FreeTextEntry8] : wax [FreeTextEntry9] : wax [de-identified] : dizzy immediately upon placing right ear down

## 2021-12-14 PROCEDURE — 97110 THERAPEUTIC EXERCISES: CPT

## 2021-12-14 PROCEDURE — 92610 EVALUATE SWALLOWING FUNCTION: CPT

## 2021-12-14 PROCEDURE — 36415 COLL VENOUS BLD VENIPUNCTURE: CPT

## 2021-12-14 PROCEDURE — 97167 OT EVAL HIGH COMPLEX 60 MIN: CPT

## 2021-12-14 PROCEDURE — U0003: CPT

## 2021-12-14 PROCEDURE — 86769 SARS-COV-2 COVID-19 ANTIBODY: CPT

## 2021-12-14 PROCEDURE — 80048 BASIC METABOLIC PNL TOTAL CA: CPT

## 2021-12-14 PROCEDURE — 97535 SELF CARE MNGMENT TRAINING: CPT

## 2021-12-14 PROCEDURE — 80053 COMPREHEN METABOLIC PANEL: CPT

## 2021-12-14 PROCEDURE — 84300 ASSAY OF URINE SODIUM: CPT

## 2021-12-14 PROCEDURE — 97116 GAIT TRAINING THERAPY: CPT

## 2021-12-14 PROCEDURE — 84443 ASSAY THYROID STIM HORMONE: CPT

## 2021-12-14 PROCEDURE — 92523 SPEECH SOUND LANG COMPREHEN: CPT

## 2021-12-14 PROCEDURE — 83935 ASSAY OF URINE OSMOLALITY: CPT

## 2021-12-14 PROCEDURE — 97112 NEUROMUSCULAR REEDUCATION: CPT

## 2021-12-14 PROCEDURE — 84550 ASSAY OF BLOOD/URIC ACID: CPT

## 2021-12-14 PROCEDURE — 83930 ASSAY OF BLOOD OSMOLALITY: CPT

## 2021-12-14 PROCEDURE — U0005: CPT

## 2021-12-14 PROCEDURE — 97163 PT EVAL HIGH COMPLEX 45 MIN: CPT

## 2021-12-14 PROCEDURE — 97530 THERAPEUTIC ACTIVITIES: CPT

## 2021-12-14 PROCEDURE — 92507 TX SP LANG VOICE COMM INDIV: CPT

## 2021-12-14 PROCEDURE — 85025 COMPLETE CBC W/AUTO DIFF WBC: CPT

## 2021-12-27 ENCOUNTER — APPOINTMENT (OUTPATIENT)
Dept: NEUROSURGERY | Facility: CLINIC | Age: 66
End: 2021-12-27
Payer: COMMERCIAL

## 2021-12-27 VITALS
DIASTOLIC BLOOD PRESSURE: 82 MMHG | HEART RATE: 74 BPM | HEIGHT: 68 IN | WEIGHT: 206 LBS | OXYGEN SATURATION: 98 % | BODY MASS INDEX: 31.22 KG/M2 | SYSTOLIC BLOOD PRESSURE: 120 MMHG

## 2021-12-27 PROCEDURE — 99214 OFFICE O/P EST MOD 30 MIN: CPT

## 2021-12-27 NOTE — PHYSICAL EXAM
[General Appearance - Alert] : alert [General Appearance - In No Acute Distress] : in no acute distress [General Appearance - Well Nourished] : well nourished [General Appearance - Well-Appearing] : healthy appearing [Oriented To Time, Place, And Person] : oriented to person, place, and time [Affect] : the affect was normal [Memory Recent] : recent memory was not impaired [Person] : oriented to person [Place] : oriented to place [Time] : oriented to time [Short Term Intact] : short term memory intact [Cranial Nerves Optic (II)] : visual acuity intact bilaterally,  pupils equal round and reactive to light [Cranial Nerves Oculomotor (III)] : extraocular motion intact [Cranial Nerves Trigeminal (V)] : facial sensation intact symmetrically [Cranial Nerves Facial (VII)] : face symmetrical [Cranial Nerves Vestibulocochlear (VIII)] : hearing was intact bilaterally [Cranial Nerves Glossopharyngeal (IX)] : tongue and palate midline [Cranial Nerves Accessory (XI - Cranial And Spinal)] : head turning and shoulder shrug symmetric [Cranial Nerves Hypoglossal (XII)] : there was no tongue deviation with protrusion [Motor Tone] : muscle tone was normal in all four extremities [Motor Strength] : muscle strength was normal in all four extremities [Sensation Tactile Decrease] : light touch was intact [Sensation Pain / Temperature Decrease] : pain and temperature was intact [Abnormal Walk] : normal gait [Balance] : balance was intact [Sclera] : the sclera and conjunctiva were normal [PERRL With Normal Accommodation] : pupils were equal in size, round, reactive to light, with normal accommodation [Outer Ear] : the ears and nose were normal in appearance [Both Tympanic Membranes Were Examined] : both tympanic membranes were normal [Neck Appearance] : the appearance of the neck was normal [] : no respiratory distress [Respiration, Rhythm And Depth] : normal respiratory rhythm and effort [Apical Impulse] : the apical impulse was normal [Heart Rate And Rhythm] : heart rate was normal and rhythm regular [Arterial Pulses Carotid] : carotid pulses were normal with no bruits [Abdominal Aorta] : the abdominal aorta was normal [No CVA Tenderness] : no ~M costovertebral angle tenderness [No Spinal Tenderness] : no spinal tenderness [Nail Clubbing] : no clubbing  or cyanosis of the fingernails [Involuntary Movements] : no involuntary movements were seen [Skin Color & Pigmentation] : normal skin color and pigmentation [Skin Turgor] : normal skin turgor

## 2022-01-02 NOTE — HISTORY OF PRESENT ILLNESS
[FreeTextEntry1] : A 66 year old female presented with headache and nausea s/p mechanical fall down stairs. Patient was admitted to neurosurgery on 10/17/2021 for R frontal tSAH, bifrontal sdh, and Lt suboccipital skull fracture. 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 Serial CT scans of head were performed during this admission with the last stable one on 10/20. Patient was advised to have RASHEEDA stocking and hydration for orthostatic hypotension. Psychiatry consult was done and adjusted her anti-depressant giving her dizziness/vertigo after fall. Neurology and ENT consults were also done for vertigo. Patient was started on meclizine as needed for dizziness and recommended with vestibular rehab. Patient was medically stable and was discharged on 10/23/21. Pt had a follow up here in the office on \par \par Today Ms Culver present ambulatory believes that she improved over all. Denies headache, speech impairment, vision problems or seizures. Her dizziness and vertigo was largely resolved. She is not taking any medications now other than Effexor. She is doing quite well with no specific symptoms.\par

## 2022-01-02 NOTE — RESULTS/DATA
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

## 2022-01-02 NOTE — ASSESSMENT
[FreeTextEntry1] : IMPRESSION:\par \par A 66 year old female presented with headache and nausea s/p mechanical fall was on ASA prior. Patient was admitted to neurosurgery on 10/17/2021 for R frontal TSAH, bifrontal sdh, and Lt suboccipital skull fracture. Pt had vertigo during hospitalization, was started on meclizine as needed for dizziness and recommended with vestibular rehab. Pt  had an MRI head in [address]  with chronic hemorrhagic contusions. \par  \par \par PLAN:\par Will take time to heal the contusions/ fracture\par She may travel , take flight to [place]\par Precautions about  seizures/ trauma  \par Follow up as needed. \par \par \par

## 2022-01-11 NOTE — PATIENT PROFILE ADULT - HISTORY OF COVID-19 VACCINATION
BMI: BMI (kg/m2): 29.6 (01-01-22 @ 22:50)  HbA1c: A1C with Estimated Average Glucose Result: 5.3 % (01-04-22 @ 10:27)    Glucose:   BP: 105/72 (01-11-22 @ 07:25) (105/72 - 115/70)  Lipid Panel: Date/Time: 01-04-22 @ 10:27  Cholesterol, Serum: 142  Direct LDL: --  HDL Cholesterol, Serum: 51  Total Cholesterol/HDL Ration Measurement: --  Triglycerides, Serum: 61   Vaccine status unknown

## 2022-04-05 RX ORDER — BUPROPION HYDROCHLORIDE 150 MG/1
2 TABLET, EXTENDED RELEASE ORAL
Qty: 0 | Refills: 0 | DISCHARGE

## 2022-04-05 RX ORDER — VENLAFAXINE HCL 75 MG
1 CAPSULE, EXT RELEASE 24 HR ORAL
Qty: 0 | Refills: 0 | DISCHARGE

## 2022-04-05 RX ORDER — BUPROPION HYDROCHLORIDE 150 MG/1
1 TABLET, EXTENDED RELEASE ORAL
Qty: 0 | Refills: 0 | DISCHARGE

## 2022-04-05 RX ORDER — VENLAFAXINE HCL 75 MG
2 CAPSULE, EXT RELEASE 24 HR ORAL
Qty: 0 | Refills: 0 | DISCHARGE

## 2022-05-02 NOTE — ED PROVIDER NOTE - MDM ORDERS SUBMITTED SELECTION
Limited triage due to adult not being present during call. Received call from Oscar Anthony at [facility] with Red Flag Complaint. Subjective: Caller states \"Leg swelling\"     Current Symptoms: RT leg swelling from calf down. Onset: 2 weeks ago; intermittent    Associated Symptoms: NA    Pain Severity: leg pain    Temperature: denies fever  What has been tried: None    LMP: NA Pregnant: NA    Recommended disposition: Go to ED/UCC Now (Or to Office with PCP Approval)    Care advice provided, patient verbalizes understanding; denies any other questions or concerns; instructed to call back for any new or worsening symptoms. Writer provided warm transfer to [facility] for 2nd level triage     Attention Provider: Thank you for allowing me to participate in the care of your patient. The patient was connected to triage in response to information provided to the ECC/PSC. Please do not respond through this encounter as the response is not directed to a shared pool.         Reason for Disposition   Thigh, calf, or ankle swelling in only one leg    Protocols used: LEG SWELLING AND EDEMA-ADULT-OH Labs/EKG/Imaging Studies/Medications

## 2022-06-26 NOTE — DISCHARGE NOTE PROVIDER - CARE PROVIDERS DIRECT ADDRESSES
show ,[email or web address]

## 2022-07-06 NOTE — H&P PST ADULT - SKIN
Caller: Patient     Chief complaint(s): A call received from patient stating that she is having a \"little bit of abdomen pain\" now, she was recommended to take pepto bismol tablets twice a day for over the weekend, so she stopped this yesterday because it was recommended for only over the weekend. Patient would like to know if she can continue to take them tablets.     Patient has tried:     Please return call to: Patient      Best Availability: Any     Can A Detailed Message Be Left? Yes     Additional Info: Patient was advised about appointment scheduled for 07/08/2022 & said that she will would like to be called back with restrictions.     Patient's pharmacy verified in Epic:           Writer has advised patient of a call back timeframe within 24-48 hours.   No lesions; no rash

## 2022-07-18 NOTE — ASU PREOP CHECKLIST - WEIGHT IN LBS
Tested positive on Saturday.  Having some congestion and cough with green sputum production with some low grade fevers.   Also complains nasal congestion  Short of breath with activity.  No wheezing or tightness.      Since she does have worsening cough and congestion I will call in her a round of antibiotics and steroids.      She will call back if she has any worsening of her symptoms.    222

## 2022-08-05 NOTE — PHYSICAL THERAPY INITIAL EVALUATION ADULT - ASSISTIVE DEVICE FOR TRANSFER: GAIT, REHAB EVAL
Major shift events: Patient tolerated HFNC throughout day. Improving VBGs. Plan for BiPAP at night. Patient agreeable with the plan. Heart/renal U/S completed.     Neuro: A&Ox4.   Cardiac: SR. VSS.   Respiratory: Sating % on HFNC.   GI/: Adequate urine output. BM X1  Diet/appetite: Tolerating regulardiet. Eating well. C/o hunger frequently.   Activity:  Assist of 1, up to chair and in halls.  Pain: At acceptable level on current regimen.   Skin: Bruising noted from Lab draws.  LDA's:    Plan: Continue with POC. Notify primary team with changes.    Charli Au RN      rolling walker

## 2022-10-10 PROBLEM — Z98.890 OTHER SPECIFIED POSTPROCEDURAL STATES: Chronic | Status: ACTIVE | Noted: 2021-10-17

## 2022-10-10 PROBLEM — E78.5 HYPERLIPIDEMIA, UNSPECIFIED: Chronic | Status: ACTIVE | Noted: 2021-10-17

## 2022-10-24 NOTE — PATIENT PROFILE ADULT - NSPROHMSYMPCOND_GEN_A_NUR
Patient presents with Na 126, baseline mid/high 130s. Now recovered to baseline. Symptoms include: None. Volume status: Eu    -- Urine Na, Osm  -- Serum Osm  -- Trend Na; goal correction < 6-8 units / 24 hours   none

## 2022-11-14 ENCOUNTER — APPOINTMENT (OUTPATIENT)
Dept: OBGYN | Facility: CLINIC | Age: 67
End: 2022-11-14

## 2022-11-14 VITALS
HEIGHT: 68 IN | WEIGHT: 238 LBS | BODY MASS INDEX: 36.07 KG/M2 | DIASTOLIC BLOOD PRESSURE: 80 MMHG | SYSTOLIC BLOOD PRESSURE: 123 MMHG

## 2022-11-14 DIAGNOSIS — D25.9 LEIOMYOMA OF UTERUS, UNSPECIFIED: ICD-10-CM

## 2022-11-14 PROCEDURE — 82270 OCCULT BLOOD FECES: CPT

## 2022-11-14 PROCEDURE — 99397 PER PM REEVAL EST PAT 65+ YR: CPT

## 2022-11-15 LAB — HPV HIGH+LOW RISK DNA PNL CVX: NOT DETECTED

## 2022-11-19 LAB — CYTOLOGY CVX/VAG DOC THIN PREP: NORMAL

## 2022-12-09 ENCOUNTER — APPOINTMENT (OUTPATIENT)
Dept: OTOLARYNGOLOGY | Facility: CLINIC | Age: 67
End: 2022-12-09

## 2022-12-09 VITALS
HEIGHT: 68 IN | BODY MASS INDEX: 36.07 KG/M2 | SYSTOLIC BLOOD PRESSURE: 131 MMHG | DIASTOLIC BLOOD PRESSURE: 57 MMHG | HEART RATE: 83 BPM | TEMPERATURE: 97.2 F | WEIGHT: 238 LBS

## 2022-12-09 DIAGNOSIS — H61.23 IMPACTED CERUMEN, BILATERAL: ICD-10-CM

## 2022-12-09 DIAGNOSIS — H81.13 BENIGN PAROXYSMAL VERTIGO, BILATERAL: ICD-10-CM

## 2022-12-09 DIAGNOSIS — J34.2 DEVIATED NASAL SEPTUM: ICD-10-CM

## 2022-12-09 PROCEDURE — 31231 NASAL ENDOSCOPY DX: CPT

## 2022-12-09 PROCEDURE — G0268 REMOVAL OF IMPACTED WAX MD: CPT

## 2022-12-09 PROCEDURE — 92567 TYMPANOMETRY: CPT

## 2022-12-09 PROCEDURE — 99213 OFFICE O/P EST LOW 20 MIN: CPT | Mod: 25

## 2022-12-09 PROCEDURE — 92557 COMPREHENSIVE HEARING TEST: CPT

## 2022-12-09 RX ORDER — NALTREXONE HYDROCHLORIDE 50 MG/1
50 TABLET, FILM COATED ORAL
Qty: 15 | Refills: 0 | Status: ACTIVE | COMMUNITY
Start: 2022-09-22

## 2022-12-09 RX ORDER — KETOCONAZOLE 20.5 MG/ML
2 SHAMPOO, SUSPENSION TOPICAL
Qty: 120 | Refills: 0 | Status: ACTIVE | COMMUNITY
Start: 2022-11-29

## 2022-12-09 RX ORDER — BUPROPION HYDROCHLORIDE 150 MG/1
150 TABLET, EXTENDED RELEASE ORAL
Qty: 30 | Refills: 0 | Status: ACTIVE | COMMUNITY
Start: 2022-11-10

## 2022-12-09 RX ORDER — METFORMIN HYDROCHLORIDE 500 MG/1
500 TABLET, COATED ORAL
Qty: 30 | Refills: 0 | Status: ACTIVE | COMMUNITY
Start: 2022-10-21

## 2022-12-09 RX ORDER — ATORVASTATIN CALCIUM 10 MG/1
10 TABLET, FILM COATED ORAL
Qty: 30 | Refills: 0 | Status: ACTIVE | COMMUNITY
Start: 2022-10-28

## 2022-12-09 RX ORDER — VENLAFAXINE HYDROCHLORIDE 75 MG/1
75 CAPSULE, EXTENDED RELEASE ORAL
Qty: 60 | Refills: 0 | Status: ACTIVE | COMMUNITY
Start: 2022-11-10

## 2022-12-09 RX ORDER — CLOBETASOL PROPIONATE 0.5 MG/G
0.05 CREAM TOPICAL
Qty: 30 | Refills: 0 | Status: ACTIVE | COMMUNITY
Start: 2022-07-07

## 2022-12-09 RX ORDER — VENLAFAXINE HYDROCHLORIDE 37.5 MG/1
37.5 CAPSULE, EXTENDED RELEASE ORAL
Qty: 30 | Refills: 0 | Status: ACTIVE | COMMUNITY
Start: 2022-06-23

## 2022-12-09 RX ORDER — PANTOPRAZOLE 40 MG/1
40 TABLET, DELAYED RELEASE ORAL
Qty: 30 | Refills: 0 | Status: ACTIVE | COMMUNITY
Start: 2022-11-14

## 2022-12-09 NOTE — PHYSICAL EXAM
[Nasal Endoscopy Performed] : nasal endoscopy was performed, see procedure section for findings [Normal] : mucosa is normal [Midline] : trachea located in midline position [de-identified] : R- wax L-clear

## 2022-12-09 NOTE — PROCEDURE
[FreeTextEntry3] : After informed verbal consent is obtained, cerumen is removed from the right ear canal with a curette and suction.\par  [FreeTextEntry6] : Anterior Rhinoscopy insufficient to account for symptoms.\par \par After informed verbal consent is obtained, the fiberoptic nasal endoscope #9 is passed via the right nasal cavity.\par The following anatomic sites were directly examined in a sequential fashion:\par The scope was introduced in both  nasal passages between the middle and inferior turbinates to exam the inferior portion of the middle meatus and the fontanelle, as well as the maxillary ostia.  Next, the scope was passed medially and posteriorly to the middle turbinates to examine the sphenoethmoid recess and the superior turbinate region.\par  \par \par   There is [ 0 ]% obstruction of the nasopharynx with adenoid tissue.\par \par A deviated nasal septum was noted causing obstruction.\par The turbinates were congested-bilateral.\par \par Right Side:\par ·               Mucosa: wnl\par ·               Mucous: none\par ·               Polyp: none\par ·               Inferior Turbinate: sl congested\par ·               Middle Turbinate:sl congested\par ·               Superior Turbinate: wnl\par ·               Inferior Meatus:clear\par ·               Middle Meatus: clear\par ·               Super Meatus: clear\par ·               Sphenoethmoidal Recess: wnl\par \par \par \par Left Side:\par ·               Mucosa: wnl\par ·               Mucous:none\par ·               Polyp: none\par ·               Inferior Turbinate: sl congested\par ·               Middle Turbinate: sl congested\par ·               Superior Turbinate:wnl\par ·               Inferior Meatus: clear\par ·               Middle Meatus: clear\par ·               Super Meatus:clear\par ·               Sphenoethmoidal Recess: wnl\par \par

## 2022-12-09 NOTE — ASSESSMENT
[FreeTextEntry1] : Ms. LOWRY 67 year F w/ hx of vertigo presents for annual. Had 3 dizziness episodes in the past 3 weeks, when she lays flat gets a brief second of feeling dizzy. + PND causing her to cough \par \par Wax\par -Cerumen is removed from the right ear canal with a curette and suction.\par -Routine debridement suggested to keep the ears free of wax.\par \par Vestibulopathy and Hearing Loss\par -Hearing Test performed to evaluate the extent of hearing loss and to explain pt's symptoms-no change\par bilateral sensorineural hearing loss-cleared for hearing aids\par \par \par PND:\par -Rx: Hypertonic saline \par \par f/u prn

## 2022-12-09 NOTE — HISTORY OF PRESENT ILLNESS
[de-identified] : PAtient complains when she lays down while exercises she gets the feeling that she will get dizzy, she has to get up and take it slow. It only happened 3 times  in the past 3 weeks. Otherwise has not got a severe dizzy episode in a while. Has a PND sometimes causes her to cough.  [Dizziness] : dizziness [Hearing Loss] : hearing loss [Presbycusis] : presbycusis [None] : The patient is currently asymptomatic.

## 2023-07-11 ENCOUNTER — APPOINTMENT (OUTPATIENT)
Dept: OBGYN | Facility: CLINIC | Age: 68
End: 2023-07-11

## 2023-07-28 ENCOUNTER — APPOINTMENT (OUTPATIENT)
Dept: OBGYN | Facility: CLINIC | Age: 68
End: 2023-07-28
Payer: COMMERCIAL

## 2023-07-28 ENCOUNTER — ASOB RESULT (OUTPATIENT)
Age: 68
End: 2023-07-28

## 2023-07-28 PROCEDURE — 76831 ECHO EXAM UTERUS: CPT

## 2023-07-28 PROCEDURE — ZZZZZ: CPT

## 2023-07-28 PROCEDURE — 58340 CATHETER FOR HYSTEROGRAPHY: CPT

## 2023-09-18 ENCOUNTER — OUTPATIENT (OUTPATIENT)
Dept: OUTPATIENT SERVICES | Facility: HOSPITAL | Age: 68
LOS: 1 days | End: 2023-09-18
Payer: COMMERCIAL

## 2023-09-18 VITALS
HEIGHT: 66 IN | WEIGHT: 223.99 LBS | SYSTOLIC BLOOD PRESSURE: 126 MMHG | TEMPERATURE: 98 F | DIASTOLIC BLOOD PRESSURE: 82 MMHG | HEART RATE: 74 BPM | OXYGEN SATURATION: 98 % | RESPIRATION RATE: 18 BRPM

## 2023-09-18 DIAGNOSIS — Z90.5 ACQUIRED ABSENCE OF KIDNEY: Chronic | ICD-10-CM

## 2023-09-18 DIAGNOSIS — Z01.818 ENCOUNTER FOR OTHER PREPROCEDURAL EXAMINATION: ICD-10-CM

## 2023-09-18 DIAGNOSIS — Z90.89 ACQUIRED ABSENCE OF OTHER ORGANS: Chronic | ICD-10-CM

## 2023-09-18 DIAGNOSIS — Z98.49 CATARACT EXTRACTION STATUS, UNSPECIFIED EYE: Chronic | ICD-10-CM

## 2023-09-18 DIAGNOSIS — Z71.89 OTHER SPECIFIED COUNSELING: ICD-10-CM

## 2023-09-18 DIAGNOSIS — Z98.890 OTHER SPECIFIED POSTPROCEDURAL STATES: Chronic | ICD-10-CM

## 2023-09-18 DIAGNOSIS — N84.0 POLYP OF CORPUS UTERI: ICD-10-CM

## 2023-09-18 PROCEDURE — 86900 BLOOD TYPING SEROLOGIC ABO: CPT

## 2023-09-18 PROCEDURE — G0463: CPT

## 2023-09-18 PROCEDURE — 86901 BLOOD TYPING SEROLOGIC RH(D): CPT

## 2023-09-18 PROCEDURE — 86850 RBC ANTIBODY SCREEN: CPT

## 2023-09-18 RX ORDER — BUPROPION HYDROCHLORIDE 150 MG/1
0 TABLET, EXTENDED RELEASE ORAL
Qty: 0 | Refills: 0 | DISCHARGE

## 2023-09-18 RX ORDER — FINASTERIDE 5 MG/1
1 TABLET, FILM COATED ORAL
Qty: 0 | Refills: 0 | DISCHARGE

## 2023-09-18 RX ORDER — SPIRONOLACTONE 25 MG/1
0 TABLET, FILM COATED ORAL
Qty: 0 | Refills: 0 | DISCHARGE

## 2023-09-18 RX ORDER — LIDOCAINE HCL 20 MG/ML
0.2 VIAL (ML) INJECTION ONCE
Refills: 0 | Status: DISCONTINUED | OUTPATIENT
Start: 2023-10-06 | End: 2023-10-20

## 2023-09-18 RX ORDER — SODIUM CHLORIDE 9 MG/ML
1000 INJECTION, SOLUTION INTRAVENOUS
Refills: 0 | Status: DISCONTINUED | OUTPATIENT
Start: 2023-10-06 | End: 2023-10-20

## 2023-09-18 NOTE — H&P PST ADULT - HISTORY OF PRESENT ILLNESS
68 year old female with hx of depression gerd, hair loss. Patient went for regular gyn exam , had ultrasound done which was positive for uterine polyps. Now coming in for D&C operative hysteroscopy endometrial polyp removal W/ AVETA.  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 Hx of SAH from fall in house 2021 resolved no symptoms. 68 year old female with hx of depression gerd, hair loss. Patient went for regular gyn exam , had ultrasound done which was positive for uterine polyps. Now coming in for D&C operative hysteroscopy endometrial polyp removal W/ AVETA.  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 Hx of SAH from fall in house 10/ 2021 resolved no symptoms.

## 2023-09-18 NOTE — H&P PST ADULT - NSICDXPASTSURGICALHX_GEN_ALL_CORE_FT
PAST SURGICAL HISTORY:  History of cataract surgery     History of cosmetic plastic surgery facelift 2007 eye  2004    History of tonsillectomy     S/p nephrectomy     Status post hysteroscopic polypectomy

## 2023-09-18 NOTE — H&P PST ADULT - NSICDXPASTMEDICALHX_GEN_ALL_CORE_FT
PAST MEDICAL HISTORY:  Depression on medication    Fall in home     GERD (gastroesophageal reflux disease) on meds prn stable at present    H/O ureterostomy     Hair loss takes spironolactone ,finasteride  for supplement    HLD (hyperlipidemia)     Mass of ureter     SAH (subarachnoid hemorrhage)

## 2023-09-18 NOTE — H&P PST ADULT - ASSESSMENT
DASI score: 7.0  DASI activity: exercise 2x wk walks 30min 2x wk  Loose teeth or denture: Denies Veneers'

## 2023-09-21 ENCOUNTER — APPOINTMENT (OUTPATIENT)
Dept: OBGYN | Facility: CLINIC | Age: 68
End: 2023-09-21
Payer: COMMERCIAL

## 2023-09-21 VITALS — SYSTOLIC BLOOD PRESSURE: 128 MMHG | DIASTOLIC BLOOD PRESSURE: 82 MMHG

## 2023-09-21 PROCEDURE — 99213 OFFICE O/P EST LOW 20 MIN: CPT

## 2023-09-21 RX ORDER — MISOPROSTOL 200 UG/1
200 TABLET ORAL
Qty: 2 | Refills: 0 | Status: ACTIVE | COMMUNITY
Start: 2023-09-21 | End: 1900-01-01

## 2023-10-06 ENCOUNTER — OUTPATIENT (OUTPATIENT)
Dept: OUTPATIENT SERVICES | Facility: HOSPITAL | Age: 68
LOS: 1 days | End: 2023-10-06
Payer: COMMERCIAL

## 2023-10-06 ENCOUNTER — APPOINTMENT (OUTPATIENT)
Dept: OBGYN | Facility: CLINIC | Age: 68
End: 2023-10-06
Payer: COMMERCIAL

## 2023-10-06 ENCOUNTER — TRANSCRIPTION ENCOUNTER (OUTPATIENT)
Age: 68
End: 2023-10-06

## 2023-10-06 ENCOUNTER — RESULT REVIEW (OUTPATIENT)
Age: 68
End: 2023-10-06

## 2023-10-06 ENCOUNTER — APPOINTMENT (OUTPATIENT)
Dept: OBGYN | Facility: HOSPITAL | Age: 68
End: 2023-10-06

## 2023-10-06 VITALS
SYSTOLIC BLOOD PRESSURE: 102 MMHG | OXYGEN SATURATION: 99 % | HEART RATE: 93 BPM | DIASTOLIC BLOOD PRESSURE: 63 MMHG | RESPIRATION RATE: 14 BRPM

## 2023-10-06 VITALS
HEIGHT: 66 IN | DIASTOLIC BLOOD PRESSURE: 80 MMHG | SYSTOLIC BLOOD PRESSURE: 124 MMHG | HEART RATE: 75 BPM | TEMPERATURE: 97 F | WEIGHT: 223.99 LBS | OXYGEN SATURATION: 100 % | RESPIRATION RATE: 16 BRPM

## 2023-10-06 DIAGNOSIS — N84.0 POLYP OF CORPUS UTERI: ICD-10-CM

## 2023-10-06 DIAGNOSIS — Z98.890 OTHER SPECIFIED POSTPROCEDURAL STATES: Chronic | ICD-10-CM

## 2023-10-06 DIAGNOSIS — Z90.89 ACQUIRED ABSENCE OF OTHER ORGANS: Chronic | ICD-10-CM

## 2023-10-06 DIAGNOSIS — Z90.5 ACQUIRED ABSENCE OF KIDNEY: Chronic | ICD-10-CM

## 2023-10-06 DIAGNOSIS — Z98.49 CATARACT EXTRACTION STATUS, UNSPECIFIED EYE: Chronic | ICD-10-CM

## 2023-10-06 PROCEDURE — 88305 TISSUE EXAM BY PATHOLOGIST: CPT

## 2023-10-06 PROCEDURE — ZZZZZ: CPT

## 2023-10-06 PROCEDURE — 88305 TISSUE EXAM BY PATHOLOGIST: CPT | Mod: 26

## 2023-10-06 PROCEDURE — 58558 HYSTEROSCOPY BIOPSY: CPT

## 2023-10-06 PROCEDURE — C1782: CPT

## 2023-10-06 DEVICE — AVETA FLEX RESECTING DEVICE 2.9MM: Type: IMPLANTABLE DEVICE | Status: FUNCTIONAL

## 2023-10-06 RX ORDER — PANTOPRAZOLE SODIUM 20 MG/1
1 TABLET, DELAYED RELEASE ORAL
Refills: 0 | DISCHARGE

## 2023-10-06 RX ORDER — OXYCODONE HYDROCHLORIDE 5 MG/1
5 TABLET ORAL ONCE
Refills: 0 | Status: DISCONTINUED | OUTPATIENT
Start: 2023-10-06 | End: 2023-10-06

## 2023-10-06 RX ORDER — ATORVASTATIN CALCIUM 80 MG/1
1 TABLET, FILM COATED ORAL
Refills: 0 | DISCHARGE

## 2023-10-06 RX ORDER — VENLAFAXINE HCL 75 MG
1 CAPSULE, EXT RELEASE 24 HR ORAL
Refills: 0 | DISCHARGE

## 2023-10-06 RX ORDER — ONDANSETRON 8 MG/1
4 TABLET, FILM COATED ORAL ONCE
Refills: 0 | Status: DISCONTINUED | OUTPATIENT
Start: 2023-10-06 | End: 2023-10-20

## 2023-10-06 RX ORDER — TIRZEPATIDE 15 MG/.5ML
2.5 INJECTION, SOLUTION SUBCUTANEOUS
Refills: 0 | DISCHARGE

## 2023-10-06 NOTE — BRIEF OPERATIVE NOTE - NSICDXBRIEFPROCEDURE_GEN_ALL_CORE_FT
PROCEDURES:  Hysteroscopy with biopsy or polypectomy 06-Oct-2023 13:29:57  Miranda Conway  Hysteroscopy without dilation and curettage of uterus 06-Oct-2023 13:30:19  Miranda Conway  Exam under anesthesia, pelvic 06-Oct-2023 13:30:29  Miranda Conway

## 2023-10-06 NOTE — ASU PATIENT PROFILE, ADULT - NSICDXPASTMEDICALHX_GEN_ALL_CORE_FT
PAST MEDICAL HISTORY:  Depression on medication    Fall in home     GERD (gastroesophageal reflux disease) on meds prn stable at present    H/O ureterostomy     Hair loss takes spironolactone ,finasteride  for supplement    HLD (hyperlipidemia)     Mass of ureter     SAH (subarachnoid hemorrhage)      Fair

## 2023-10-06 NOTE — ASU DISCHARGE PLAN (ADULT/PEDIATRIC) - NS MD DC FALL RISK RISK
For information on Fall & Injury Prevention, visit: [email or web address] OR  [email or web address] OR  https://www.cdc.gov/steadi/patient.html

## 2023-10-06 NOTE — ASU PATIENT PROFILE, ADULT - FALL HARM RISK - HARM RISK INTERVENTIONS
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
## 2023-10-09 PROBLEM — I60.9 NONTRAUMATIC SUBARACHNOID HEMORRHAGE, UNSPECIFIED: Chronic | Status: ACTIVE | Noted: 2023-09-18

## 2023-10-09 PROBLEM — N28.89 OTHER SPECIFIED DISORDERS OF KIDNEY AND URETER: Chronic | Status: ACTIVE | Noted: 2023-09-18

## 2023-10-11 LAB — SURGICAL PATHOLOGY STUDY: SIGNIFICANT CHANGE UP

## 2023-10-19 ENCOUNTER — APPOINTMENT (OUTPATIENT)
Dept: OBGYN | Facility: CLINIC | Age: 68
End: 2023-10-19
Payer: COMMERCIAL

## 2023-10-19 VITALS — SYSTOLIC BLOOD PRESSURE: 118 MMHG | DIASTOLIC BLOOD PRESSURE: 76 MMHG

## 2023-10-19 DIAGNOSIS — N84.0 POLYP OF CORPUS UTERI: ICD-10-CM

## 2023-10-19 PROCEDURE — 99213 OFFICE O/P EST LOW 20 MIN: CPT

## 2023-11-21 NOTE — H&P PST ADULT - BLOOD AVOIDANCE/RESTRICTIONS, PROFILE
----- Message from Gayla Culp 
1/8 at  10  
none

## 2023-11-29 ENCOUNTER — APPOINTMENT (OUTPATIENT)
Dept: OBGYN | Facility: CLINIC | Age: 68
End: 2023-11-29
Payer: COMMERCIAL

## 2023-11-29 VITALS
HEIGHT: 68 IN | WEIGHT: 219 LBS | SYSTOLIC BLOOD PRESSURE: 123 MMHG | DIASTOLIC BLOOD PRESSURE: 80 MMHG | BODY MASS INDEX: 33.19 KG/M2

## 2023-11-29 DIAGNOSIS — N76.2 ACUTE VULVITIS: ICD-10-CM

## 2023-11-29 DIAGNOSIS — D21.9 BENIGN NEOPLASM OF CONNECTIVE AND OTHER SOFT TISSUE, UNSPECIFIED: ICD-10-CM

## 2023-11-29 DIAGNOSIS — Z01.419 ENCOUNTER FOR GYNECOLOGICAL EXAMINATION (GENERAL) (ROUTINE) W/OUT ABNORMAL FINDINGS: ICD-10-CM

## 2023-11-29 PROCEDURE — 99397 PER PM REEVAL EST PAT 65+ YR: CPT

## 2023-11-29 PROCEDURE — 82270 OCCULT BLOOD FECES: CPT

## 2023-12-03 LAB — HPV HIGH+LOW RISK DNA PNL CVX: NOT DETECTED

## 2023-12-05 LAB — CYTOLOGY CVX/VAG DOC THIN PREP: NORMAL

## 2023-12-08 ENCOUNTER — NON-APPOINTMENT (OUTPATIENT)
Age: 68
End: 2023-12-08

## 2023-12-08 LAB
A VAGINAE DNA VAG QL NAA+PROBE: NORMAL
BVAB2 DNA VAG QL NAA+PROBE: NORMAL
C KRUSEI DNA VAG QL NAA+PROBE: NEGATIVE
C KRUSEI DNA VAG QL NAA+PROBE: POSITIVE
M GENITALIUM DNA SPEC QL NAA+PROBE: NEGATIVE
M HOMINIS DNA SPEC QL NAA+PROBE: NEGATIVE
MEGA1 DNA VAG QL NAA+PROBE: NORMAL
T VAGINALIS RRNA SPEC QL NAA+PROBE: NEGATIVE
UREAPLASMA DNA SPEC QL NAA+PROBE: NEGATIVE

## 2023-12-14 ENCOUNTER — APPOINTMENT (OUTPATIENT)
Dept: OTOLARYNGOLOGY | Facility: CLINIC | Age: 68
End: 2023-12-14
Payer: COMMERCIAL

## 2023-12-14 DIAGNOSIS — J31.0 CHRONIC RHINITIS: ICD-10-CM

## 2023-12-14 DIAGNOSIS — R09.82 POSTNASAL DRIP: ICD-10-CM

## 2023-12-14 DIAGNOSIS — H90.3 SENSORINEURAL HEARING LOSS, BILATERAL: ICD-10-CM

## 2023-12-14 PROCEDURE — 92557 COMPREHENSIVE HEARING TEST: CPT

## 2023-12-14 PROCEDURE — 92567 TYMPANOMETRY: CPT

## 2023-12-14 PROCEDURE — 31231 NASAL ENDOSCOPY DX: CPT

## 2023-12-14 PROCEDURE — 99214 OFFICE O/P EST MOD 30 MIN: CPT | Mod: 25

## 2023-12-14 RX ORDER — FLUTICASONE PROPIONATE 50 UG/1
50 SPRAY, METERED NASAL
Qty: 5 | Refills: 3 | Status: ACTIVE | COMMUNITY
Start: 2023-12-14 | End: 1900-01-01

## 2023-12-15 PROBLEM — R09.82 PND (POST-NASAL DRIP): Status: ACTIVE | Noted: 2022-12-09

## 2023-12-15 PROBLEM — H90.3 SENSORY HEARING LOSS, BILATERAL: Status: ACTIVE | Noted: 2021-12-08

## 2023-12-15 PROBLEM — J31.0 CHRONIC RHINITIS: Status: ACTIVE | Noted: 2022-12-09

## 2023-12-15 NOTE — HISTORY OF PRESENT ILLNESS
[de-identified] : Patient complains her hearing has decreased over the past year. When she in a loud Enviromint she cant hear what is being said. Certain tones she cant hear. Also has a PND x 4 days, she is using AYR nasal spray for several days has no improvement with it.

## 2023-12-15 NOTE — END OF VISIT
[FreeTextEntry3] : I personally saw and examined ROSETTA LOWRY in detail. I spoke to ROSALIE Gilbert regarding the assessment and plan of care. I reviewed the above assessment and plan of care, and agree. I have made changes in changes in the body of the note where appropriate.I personally reviewed the HPI, PMH, FH, SH, ROS and medications/allergies. I have spoken to ROSALIE Gilbert regarding the history and have personally determined the assessment and plan of care, and documented this myself. I was present and participated in all key portions of the encounter and all procedures noted above. I have made changes in the body of the note where appropriate.  Attesting Faculty: See Attending Signature Below

## 2023-12-15 NOTE — DATA REVIEWED
[de-identified] : Hearing Test performed to evaluate the extent of hearing loss and  to explain pt's symptoms today's hearing test was personally reviewed and revealed Tymps-wnl bilat SNHL
facial droop

## 2023-12-15 NOTE — ASSESSMENT
[FreeTextEntry1] : Ms. LOWRY 68 year F complains hearing decreased over the past year, harder to hear when she is with a large crowd. Also has PND x 4 days   Bilateral SNHL: -cleared for hearing aids -Hearing Test performed to evaluate the extent of hearing loss and to explain pt's symptoms   PND w/ DNS: - Steam Humidification  - Rx: Flonase   f/u prn

## 2023-12-26 ENCOUNTER — NON-APPOINTMENT (OUTPATIENT)
Age: 68
End: 2023-12-26

## 2024-01-28 NOTE — DISCHARGE NOTE PROVIDER - NS AS DC PROVIDER CONTACT Y/N MULTI
comfortable appearance/family/SO at bedside
Yes

## 2024-05-24 ENCOUNTER — APPOINTMENT (OUTPATIENT)
Dept: OBGYN | Facility: CLINIC | Age: 69
End: 2024-05-24
Payer: COMMERCIAL

## 2024-05-24 DIAGNOSIS — Z15.01 GENETIC SUSCEPTIBILITY TO MALIGNANT NEOPLASM OF BREAST: ICD-10-CM

## 2024-05-24 PROCEDURE — 99214 OFFICE O/P EST MOD 30 MIN: CPT | Mod: 25

## 2024-05-24 PROCEDURE — 36415 COLL VENOUS BLD VENIPUNCTURE: CPT

## 2024-05-24 NOTE — ASSESSMENT
[FreeTextEntry1] : Discussed options for patient if any of the testing is positive.  Disclosure to family. Discussed risks and limitations of multi-gene testing, including management of variances of unknown significance (VUS). Discussed insurance issues. RTO in 4 weeks to discuss results and plans for screening.  35 mins including visit, review of laboratories, review of recent studies, test ordering.

## 2024-05-24 NOTE — SIGNATURES
[TextEntry] : This note was authored by Steffi Craig working as a scribe for Dr. Chance Goss.   I, Dr. Chance Goss, have reviewed the content of this note and confirm it is true and accurate. I personally performed the history and physical examination and made all the decisions. 05/24/2024

## 2024-05-24 NOTE — HISTORY OF PRESENT ILLNESS
[Personal History of Cancer] : Patient has a personal history of cancer [Family History of Cancer] : family history of cancer [Ashkenazi Baptism Ancestry] : Ashkenazi Baptism Ancestry [FreeTextEntry1] : 05/24/2024. ROSETTA LOWRY 68-year-old female presents for genetic counseling. 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 Pt former smoker - quit in 1992. Of Ashkenazi Yazdanism ancestry. Children A&W.  Risk Factors: Of Ashkenazi Yazdanism ancestry. Maternal uncle w/ brain ca in his late 70s. Two maternal first cousins w/ breast ca at 40, one w/ GYN ca. Niece genetically positive for breast ca susceptibility.  Neice w/ breast ca at 37.  See scanned pedigree.

## 2024-05-24 NOTE — COUNSELING
[Discussed how results of genetic test could impact her health/CA risk and direct medical management] : Discussed how results of genetic test could impact her health/CA risk and direct medical management [Informed of the three different test results: Positive/Negative/Variant of undetermined significance] : Informed of the three different test results: Positive/Negative/Variant of undetermined significance [Discussed possible options for risk reducing medical and surgical management and increase surveillance] : Discussed possible options for risk reducing medical and surgical management and increase surveillance [Discussed limitations of test] : Discussed limitations of test [Discussed implication of results for patient's family/relatives] : Discussed implication of results for patient's family/relatives [Multiple genes tested] : Multiple genes tested [Discussed survival rate and referrals to specialists] : Discussed survival rate and referrals to specialists [Discussed coverage and expense of test] : Discussed coverage and expense of test [Discussed life insurance] : Discussed life insurance [Agreed to testing] : Patient agreed to testing

## 2024-07-15 ENCOUNTER — APPOINTMENT (OUTPATIENT)
Dept: ULTRASOUND IMAGING | Facility: IMAGING CENTER | Age: 69
End: 2024-07-15
Payer: COMMERCIAL

## 2024-07-15 ENCOUNTER — OUTPATIENT (OUTPATIENT)
Dept: OUTPATIENT SERVICES | Facility: HOSPITAL | Age: 69
LOS: 1 days | End: 2024-07-15
Payer: COMMERCIAL

## 2024-07-15 ENCOUNTER — RESULT REVIEW (OUTPATIENT)
Age: 69
End: 2024-07-15

## 2024-07-15 ENCOUNTER — APPOINTMENT (OUTPATIENT)
Dept: MAMMOGRAPHY | Facility: IMAGING CENTER | Age: 69
End: 2024-07-15
Payer: COMMERCIAL

## 2024-07-15 DIAGNOSIS — Z98.890 OTHER SPECIFIED POSTPROCEDURAL STATES: Chronic | ICD-10-CM

## 2024-07-15 DIAGNOSIS — Z00.8 ENCOUNTER FOR OTHER GENERAL EXAMINATION: ICD-10-CM

## 2024-07-15 DIAGNOSIS — Z90.5 ACQUIRED ABSENCE OF KIDNEY: Chronic | ICD-10-CM

## 2024-07-15 DIAGNOSIS — Z01.419 ENCOUNTER FOR GYNECOLOGICAL EXAMINATION (GENERAL) (ROUTINE) WITHOUT ABNORMAL FINDINGS: ICD-10-CM

## 2024-07-15 DIAGNOSIS — Z98.49 CATARACT EXTRACTION STATUS, UNSPECIFIED EYE: Chronic | ICD-10-CM

## 2024-07-15 DIAGNOSIS — Z90.89 ACQUIRED ABSENCE OF OTHER ORGANS: Chronic | ICD-10-CM

## 2024-07-15 DIAGNOSIS — D21.9 BENIGN NEOPLASM OF CONNECTIVE AND OTHER SOFT TISSUE, UNSPECIFIED: ICD-10-CM

## 2024-07-15 PROCEDURE — 76856 US EXAM PELVIC COMPLETE: CPT | Mod: 26

## 2024-07-15 PROCEDURE — 76830 TRANSVAGINAL US NON-OB: CPT | Mod: 26

## 2024-07-15 PROCEDURE — 77063 BREAST TOMOSYNTHESIS BI: CPT | Mod: 26

## 2024-07-15 PROCEDURE — 77067 SCR MAMMO BI INCL CAD: CPT | Mod: 26

## 2024-07-15 PROCEDURE — 77067 SCR MAMMO BI INCL CAD: CPT

## 2024-07-15 PROCEDURE — 76830 TRANSVAGINAL US NON-OB: CPT

## 2024-07-15 PROCEDURE — 76641 ULTRASOUND BREAST COMPLETE: CPT | Mod: 26,50

## 2024-07-15 PROCEDURE — 76856 US EXAM PELVIC COMPLETE: CPT

## 2024-07-15 PROCEDURE — 77063 BREAST TOMOSYNTHESIS BI: CPT

## 2024-07-15 PROCEDURE — 76641 ULTRASOUND BREAST COMPLETE: CPT

## 2024-07-18 ENCOUNTER — APPOINTMENT (OUTPATIENT)
Dept: OBGYN | Facility: CLINIC | Age: 69
End: 2024-07-18

## 2024-08-16 ENCOUNTER — APPOINTMENT (OUTPATIENT)
Dept: OBGYN | Facility: CLINIC | Age: 69
End: 2024-08-16

## 2024-09-13 ENCOUNTER — APPOINTMENT (OUTPATIENT)
Dept: COLORECTAL SURGERY | Facility: CLINIC | Age: 69
End: 2024-09-13
Payer: COMMERCIAL

## 2024-09-13 VITALS
OXYGEN SATURATION: 99 % | HEIGHT: 68 IN | HEART RATE: 73 BPM | DIASTOLIC BLOOD PRESSURE: 59 MMHG | BODY MASS INDEX: 32.13 KG/M2 | RESPIRATION RATE: 16 BRPM | WEIGHT: 212 LBS | SYSTOLIC BLOOD PRESSURE: 104 MMHG

## 2024-09-13 PROCEDURE — 99203 OFFICE O/P NEW LOW 30 MIN: CPT

## 2024-09-13 RX ORDER — MINOXIDIL 5 %
5 SOLUTION, NON-ORAL TOPICAL
Refills: 0 | Status: ACTIVE | COMMUNITY

## 2024-09-13 RX ORDER — TIRZEPATIDE 7.5 MG/.5ML
INJECTION, SOLUTION SUBCUTANEOUS
Refills: 0 | Status: ACTIVE | COMMUNITY

## 2024-09-13 RX ORDER — PANTOPRAZOLE SODIUM 40 MG/10ML
INJECTION, POWDER, FOR SOLUTION INTRAVENOUS
Refills: 0 | Status: ACTIVE | COMMUNITY

## 2024-09-16 ENCOUNTER — APPOINTMENT (OUTPATIENT)
Dept: OBGYN | Facility: CLINIC | Age: 69
End: 2024-09-16
Payer: COMMERCIAL

## 2024-09-16 ENCOUNTER — ASOB RESULT (OUTPATIENT)
Age: 69
End: 2024-09-16

## 2024-09-16 DIAGNOSIS — R93.89 ABNORMAL FINDINGS ON DIAGNOSTIC IMAGING OF OTHER SPECIFIED BODY STRUCTURES: ICD-10-CM

## 2024-09-16 PROCEDURE — ZZZZZ: CPT

## 2024-09-16 PROCEDURE — 58340 CATHETER FOR HYSTEROGRAPHY: CPT

## 2024-09-16 PROCEDURE — 76831 ECHO EXAM UTERUS: CPT | Mod: 59

## 2024-09-16 NOTE — ASSESSMENT
[FreeTextEntry1] : Surveillance Colonoscopy.  Will hold Mounjaro from week before.  Will take Ducolax or COM if no bowel movement on Saturday.  MiraLAX prep reviewed.

## 2024-09-16 NOTE — PLAN
[FreeTextEntry1] : Sonohysterogram: UCG negative Pt tolerated well - discussed risk of bleeding or infection Findings: 3 mm polyp Call MD if heavy bleeding, fever, or chills.  telemed for pre surg consult RTO PRN

## 2024-09-16 NOTE — PHYSICAL EXAM
[Normal Breath Sounds] : Normal breath sounds [Normal Heart Sounds] : normal heart sounds [Normal Rate and Rhythm] : normal rate and rhythm [No Rash or Lesion] : No rash or lesion [Alert] : alert [Oriented to Person] : oriented to person [Oriented to Place] : oriented to place [Oriented to Time] : oriented to time [Calm] : calm [de-identified] : obese abdomen [de-identified] : Normal Female [de-identified] : MISHEL/+ROM [de-identified] : NC/AT [de-identified] : Intact

## 2024-09-16 NOTE — REVIEW OF SYSTEMS
[Constipation] : constipation [Anxiety] : anxiety [Depression] : depression [Negative] : Heme/Lymph [FreeTextEntry3] : Wears glasses [FreeTextEntry5] : Elevated cholesterol [FreeTextEntry7] : 2-3 Bowel movements a week [FreeTextEntry8] : Hx ureter cancer s/p removal of ureter and kidney [FreeTextEntry9] : Back pain, knee and hip pain [de-identified] : Occasional Vertigo [FreeTextEntry1] : On Mounjaro for weight loss

## 2024-09-16 NOTE — HISTORY OF PRESENT ILLNESS
[FreeTextEntry1] : 09/16/2024. ROSETTA LOWRY 69 year old female She presents for a sonohysterogram   7/15/2024 pelvic sono - mildly prominent endometrium 5mm, heterogeneous myometrium, 1.3cm right ovarian cyst, 0.8cm left ovarian cyst  She is here today for a sonohysterogram due to thickened endometrial lining on 7/15/2024 sono   She denies abdominal and pelvic pain, no vaginal discharge or vaginitis symptoms. She reports normal urination, no dysuria, no incontinence of urine. BM is normal per patient, no blood in stool or constipation/diarrhea.

## 2024-09-16 NOTE — PROCEDURE
[Saline Infusion Sonography] : saline infusion sonography [FreeTextEntry9] : 7/15/2024 pelvic sono - mildly prominent endometrium 5mm [FreeTextEntry3] : Discussed risk of bleeding or infection Under sterile technique A speculum was placed in the vagina and the Cervix was identified and prepped with Betadine SHG catheter was threaded through the external os until endometrial location was felt The balloon was then inflated with .5 cc of saline The transvaginal probe was then inserted into the vagina by the tech The catheter and balloon was localized Saline was gently instilled while the imaging was performed The endometrial cavity was fully visualized Findings: 3 mm polyp

## 2024-09-16 NOTE — HISTORY OF PRESENT ILLNESS
[FreeTextEntry1] : Patient is a 68 yo WF here fore pre-visit prior to colonoscopy.  Las study was about 4 years ago (cannot remember doctors name) and she reports there was a polyp.  She has also had a polyp in the past.  She was referred here by Dr. Landin but her primary MD is Dr. Culver.  States has some constipation especially since starting Moujaro.  Denies any rectal bleeding and no family history.

## 2024-09-16 NOTE — REVIEW OF SYSTEMS
[Constipation] : constipation [Anxiety] : anxiety [Depression] : depression [Negative] : Heme/Lymph [FreeTextEntry3] : Wears glasses [FreeTextEntry5] : Elevated cholesterol [FreeTextEntry7] : 2-3 Bowel movements a week [FreeTextEntry8] : Hx ureter cancer s/p removal of ureter and kidney [FreeTextEntry9] : Back pain, knee and hip pain [de-identified] : Occasional Vertigo [FreeTextEntry1] : On Mounjaro for weight loss

## 2024-09-16 NOTE — PHYSICAL EXAM
[Normal Breath Sounds] : Normal breath sounds [Normal Heart Sounds] : normal heart sounds [Normal Rate and Rhythm] : normal rate and rhythm [No Rash or Lesion] : No rash or lesion [Alert] : alert [Oriented to Person] : oriented to person [Oriented to Place] : oriented to place [Oriented to Time] : oriented to time [Calm] : calm [de-identified] : obese abdomen [de-identified] : Normal Female [de-identified] : MISHEL/+ROM [de-identified] : NC/AT [de-identified] : Intact

## 2024-09-23 ENCOUNTER — APPOINTMENT (OUTPATIENT)
Dept: COLORECTAL SURGERY | Facility: CLINIC | Age: 69
End: 2024-09-23
Payer: COMMERCIAL

## 2024-09-23 ENCOUNTER — APPOINTMENT (OUTPATIENT)
Dept: OBGYN | Facility: CLINIC | Age: 69
End: 2024-09-23
Payer: COMMERCIAL

## 2024-09-23 DIAGNOSIS — N84.0 POLYP OF CORPUS UTERI: ICD-10-CM

## 2024-09-23 PROCEDURE — 45378 DIAGNOSTIC COLONOSCOPY: CPT

## 2024-09-23 PROCEDURE — 99212 OFFICE O/P EST SF 10 MIN: CPT

## 2024-09-23 NOTE — REASON FOR VISIT
[Home] : at home, [unfilled] , at the time of the visit. [Medical Office: ([facility])___] : at the medical office located in  [Patient] : the patient

## 2024-09-23 NOTE — HISTORY OF PRESENT ILLNESS
[FreeTextEntry1] :  ROSETTA LOWRY 69 year old female  LMP 2009 PM She presents for an annual gyn exam.   Small polyp noted on SHG.  Had Hysteroscopy, D&C in 10/2023 for benign polyp.  She denies vaginal bleeding. She denies abdominal and pelvic pain, She reports normal urination, no dysuria, no incontinence of urine. BM is normal per patient, no blood in stool or constipation/diarrhea.   She sees oncologist annually, s/p nml bladder testing. She reports ureter ca dx after seeing PCP for inability to urinate, s/p abnormal UA, CT scan, incomplete bx, right nephrectomy . She sees internist and dermatologist regularly.  Cystosocpy 10/23 wnl per patient.   Obhx:  FT  x3 GYNhx: Uterine polyps, fibroids, small ovarian cysts. No abnl paps, HPV, STD, PID, or IUD contraception PMH: Ureter ca s/p radiation in remission for 2.5 years s/p right nephrectomy , concussion 10/2021, head fracture 10/2021, vertigo PSH: hysteroscopy d&C for polyp (, ), cosmetic surgery , hair transplant,  hysteroscopy for for uterine polyps,  R/ nephrectomy for ureterhal ca, cataract surgery Med: Wellbutrin, Effexor, wellbutrin, multivitamin, mounjaro  Vitd/folate/in her multi vit All: NKDA Fhx: maternal first cousins z2 w/ breast ca in late 40s, mother CLL, father CAD, prostate ca (70's). Denies FHx of ovarian, uterine, colon, pancreatic, cancer. SocHx: Former smoker (quit ), denies alcohol use, and drug use,  s/p COVID vaccines w/ boosters

## 2024-09-24 ENCOUNTER — TRANSCRIPTION ENCOUNTER (OUTPATIENT)
Age: 69
End: 2024-09-24

## 2024-10-01 ENCOUNTER — OUTPATIENT (OUTPATIENT)
Dept: OUTPATIENT SERVICES | Facility: HOSPITAL | Age: 69
LOS: 1 days | End: 2024-10-01
Payer: COMMERCIAL

## 2024-10-01 VITALS
DIASTOLIC BLOOD PRESSURE: 69 MMHG | OXYGEN SATURATION: 98 % | TEMPERATURE: 98 F | HEART RATE: 70 BPM | RESPIRATION RATE: 16 BRPM | HEIGHT: 68 IN | WEIGHT: 214.07 LBS | SYSTOLIC BLOOD PRESSURE: 106 MMHG

## 2024-10-01 DIAGNOSIS — N84.0 POLYP OF CORPUS UTERI: ICD-10-CM

## 2024-10-01 DIAGNOSIS — Z90.5 ACQUIRED ABSENCE OF KIDNEY: Chronic | ICD-10-CM

## 2024-10-01 DIAGNOSIS — Z98.49 CATARACT EXTRACTION STATUS, UNSPECIFIED EYE: Chronic | ICD-10-CM

## 2024-10-01 DIAGNOSIS — Z98.890 OTHER SPECIFIED POSTPROCEDURAL STATES: Chronic | ICD-10-CM

## 2024-10-01 DIAGNOSIS — Z90.89 ACQUIRED ABSENCE OF OTHER ORGANS: Chronic | ICD-10-CM

## 2024-10-01 DIAGNOSIS — Z01.818 ENCOUNTER FOR OTHER PREPROCEDURAL EXAMINATION: ICD-10-CM

## 2024-10-01 LAB
ANION GAP SERPL CALC-SCNC: 13 MMOL/L — SIGNIFICANT CHANGE UP (ref 5–17)
BUN SERPL-MCNC: 16 MG/DL — SIGNIFICANT CHANGE UP (ref 7–23)
CALCIUM SERPL-MCNC: 8.9 MG/DL — SIGNIFICANT CHANGE UP (ref 8.4–10.5)
CHLORIDE SERPL-SCNC: 107 MMOL/L — SIGNIFICANT CHANGE UP (ref 96–108)
CO2 SERPL-SCNC: 25 MMOL/L — SIGNIFICANT CHANGE UP (ref 22–31)
CREAT SERPL-MCNC: 1.14 MG/DL — SIGNIFICANT CHANGE UP (ref 0.5–1.3)
EGFR: 51 ML/MIN/1.73M2 — LOW
GLUCOSE SERPL-MCNC: 86 MG/DL — SIGNIFICANT CHANGE UP (ref 70–99)
HCT VFR BLD CALC: 41.2 % — SIGNIFICANT CHANGE UP (ref 34.5–45)
HGB BLD-MCNC: 13 G/DL — SIGNIFICANT CHANGE UP (ref 11.5–15.5)
MCHC RBC-ENTMCNC: 29.9 PG — SIGNIFICANT CHANGE UP (ref 27–34)
MCHC RBC-ENTMCNC: 31.6 GM/DL — LOW (ref 32–36)
MCV RBC AUTO: 94.7 FL — SIGNIFICANT CHANGE UP (ref 80–100)
NRBC # BLD: 0 /100 WBCS — SIGNIFICANT CHANGE UP (ref 0–0)
PLATELET # BLD AUTO: 238 K/UL — SIGNIFICANT CHANGE UP (ref 150–400)
POTASSIUM SERPL-MCNC: 4 MMOL/L — SIGNIFICANT CHANGE UP (ref 3.5–5.3)
POTASSIUM SERPL-SCNC: 4 MMOL/L — SIGNIFICANT CHANGE UP (ref 3.5–5.3)
RBC # BLD: 4.35 M/UL — SIGNIFICANT CHANGE UP (ref 3.8–5.2)
RBC # FLD: 14.9 % — HIGH (ref 10.3–14.5)
SODIUM SERPL-SCNC: 145 MMOL/L — SIGNIFICANT CHANGE UP (ref 135–145)
WBC # BLD: 5.91 K/UL — SIGNIFICANT CHANGE UP (ref 3.8–10.5)
WBC # FLD AUTO: 5.91 K/UL — SIGNIFICANT CHANGE UP (ref 3.8–10.5)

## 2024-10-01 PROCEDURE — 80048 BASIC METABOLIC PNL TOTAL CA: CPT

## 2024-10-01 PROCEDURE — G0463: CPT

## 2024-10-01 PROCEDURE — 85027 COMPLETE CBC AUTOMATED: CPT

## 2024-10-01 PROCEDURE — 36415 COLL VENOUS BLD VENIPUNCTURE: CPT

## 2024-10-01 RX ORDER — SODIUM CHLORIDE IRRIG SOLUTION 0.9 %
1000 SOLUTION, IRRIGATION IRRIGATION
Refills: 0 | Status: DISCONTINUED | OUTPATIENT
Start: 2024-10-16 | End: 2024-10-30

## 2024-10-01 RX ORDER — SODIUM CHLORIDE 0.9 % (FLUSH) 0.9 %
3 SYRINGE (ML) INJECTION EVERY 8 HOURS
Refills: 0 | Status: DISCONTINUED | OUTPATIENT
Start: 2024-10-16 | End: 2024-10-30

## 2024-10-01 NOTE — H&P PST ADULT - PROBLEM SELECTOR PLAN 1
planned for D&C, Operative hysteroscopy polypectomy on 10/16/2024 Dr. Conway  [facility], [place] labs send  preprocedure instructions discussed   Hold Mounjaro--no GI symptoms, last dose 9/30

## 2024-10-01 NOTE — H&P PST ADULT - HISTORY OF PRESENT ILLNESS
69 year old female with PMH of Depression GERD, hair loss/ hair transplant, Ureter ca s/p radiation in remission  s/p right nephrectomy 2020, fall/ SAH/Lt suboccipital skull fracture 10/2021 resolved no symptoms,   s/p D&C operative hysteroscopy endometrial polyp [id number] in [place] now with Uterine polyp planned for D&C, Operative hysteroscopy polypectomy on 10/16/2024 Dr. Conway

## 2024-10-01 NOTE — H&P PST ADULT - ASSESSMENT
DASI score: 6.8  DASI activity: walks/ stairs/house chores w/o cp/SOB  Loose teeth or denture: Denies Veneers'

## 2024-10-16 ENCOUNTER — RESULT REVIEW (OUTPATIENT)
Age: 69
End: 2024-10-16

## 2024-10-16 ENCOUNTER — APPOINTMENT (OUTPATIENT)
Dept: OBGYN | Facility: HOSPITAL | Age: 69
End: 2024-10-16
Payer: COMMERCIAL

## 2024-10-16 ENCOUNTER — OUTPATIENT (OUTPATIENT)
Dept: OUTPATIENT SERVICES | Facility: HOSPITAL | Age: 69
LOS: 1 days | End: 2024-10-16
Payer: COMMERCIAL

## 2024-10-16 ENCOUNTER — TRANSCRIPTION ENCOUNTER (OUTPATIENT)
Age: 69
End: 2024-10-16

## 2024-10-16 VITALS
TEMPERATURE: 97 F | HEIGHT: 68 IN | WEIGHT: 214.07 LBS | HEART RATE: 61 BPM | RESPIRATION RATE: 16 BRPM | OXYGEN SATURATION: 100 % | DIASTOLIC BLOOD PRESSURE: 82 MMHG | SYSTOLIC BLOOD PRESSURE: 128 MMHG

## 2024-10-16 VITALS
RESPIRATION RATE: 15 BRPM | HEART RATE: 70 BPM | DIASTOLIC BLOOD PRESSURE: 55 MMHG | SYSTOLIC BLOOD PRESSURE: 122 MMHG | OXYGEN SATURATION: 98 %

## 2024-10-16 DIAGNOSIS — Z98.890 OTHER SPECIFIED POSTPROCEDURAL STATES: Chronic | ICD-10-CM

## 2024-10-16 DIAGNOSIS — Z90.89 ACQUIRED ABSENCE OF OTHER ORGANS: Chronic | ICD-10-CM

## 2024-10-16 DIAGNOSIS — Z98.49 CATARACT EXTRACTION STATUS, UNSPECIFIED EYE: Chronic | ICD-10-CM

## 2024-10-16 DIAGNOSIS — Z90.5 ACQUIRED ABSENCE OF KIDNEY: Chronic | ICD-10-CM

## 2024-10-16 DIAGNOSIS — N84.0 POLYP OF CORPUS UTERI: ICD-10-CM

## 2024-10-16 PROCEDURE — 88305 TISSUE EXAM BY PATHOLOGIST: CPT

## 2024-10-16 PROCEDURE — ZZZZZ: CPT

## 2024-10-16 PROCEDURE — 58558 HYSTEROSCOPY BIOPSY: CPT

## 2024-10-16 PROCEDURE — C1782: CPT

## 2024-10-16 PROCEDURE — 57500 BIOPSY OF CERVIX: CPT

## 2024-10-16 PROCEDURE — 88305 TISSUE EXAM BY PATHOLOGIST: CPT | Mod: 26

## 2024-10-16 DEVICE — AVETA FLEX RESECTING DEVICE 2.9MM: Type: IMPLANTABLE DEVICE | Status: FUNCTIONAL

## 2024-10-16 DEVICE — AVETA WAVE PLUS RESECTING DEVICE 3.9MM: Type: IMPLANTABLE DEVICE | Status: FUNCTIONAL

## 2024-10-16 DEVICE — AVETA SMOL RESECTING DEVICE 2.9MM: Type: IMPLANTABLE DEVICE | Status: FUNCTIONAL

## 2024-10-16 RX ORDER — FENTANYL CITRATE-0.9 % NACL/PF 300MCG/30
25 PATIENT CONTROLLED ANALGESIA VIAL INJECTION
Refills: 0 | Status: DISCONTINUED | OUTPATIENT
Start: 2024-10-16 | End: 2024-10-16

## 2024-10-16 RX ORDER — ONDANSETRON HCL/PF 4 MG/2 ML
4 VIAL (ML) INJECTION ONCE
Refills: 0 | Status: DISCONTINUED | OUTPATIENT
Start: 2024-10-16 | End: 2024-10-30

## 2024-10-16 RX ORDER — LIDOCAINE HCL 20 MG/ML
0.2 AMPUL (ML) INJECTION ONCE
Refills: 0 | Status: COMPLETED | OUTPATIENT
Start: 2024-10-16 | End: 2024-10-16

## 2024-10-16 RX ADMIN — Medication 100 MILLILITER(S): at 12:06

## 2024-10-16 RX ADMIN — Medication 3 MILLILITER(S): at 12:11

## 2024-10-16 NOTE — ASU DISCHARGE PLAN (ADULT/PEDIATRIC) - FINANCIAL ASSISTANCE
[facility] provides services at a reduced cost to those who are determined to be eligible through [facility]’s financial assistance program. Information regarding [facility]’s financial assistance program can be found by going to [email or web address] or by calling [phone].

## 2024-10-16 NOTE — ASU PATIENT PROFILE, ADULT - WHEN WAS YOUR LAST VACCINATION? YEAR
What Type Of Note Output Would You Prefer (Optional)?: Bullet Format 2022 How Severe Are Your Spot(S)?: mild Have Your Spot(S) Been Treated In The Past?: has not been treated Hpi Title: Evaluation of Skin Lesions

## 2024-10-16 NOTE — ASU DISCHARGE PLAN (ADULT/PEDIATRIC) - ASU DC SPECIAL INSTRUCTIONSFT
MD DANIEL HOOD MD MARINESE DORENE, MD CHUNG HETTY, MD HUNTER TIFFANY, MD AHAMED TARNIMA, MD  PHONE: [phone]  FAX: [phone]    POSTOPERATIVE INSTRUCTIONS FOR HYSTEROSCOPY, ENDOMETRIAL POLYP/FIBROID REMOVAL,D&C    After your surgery it is normal to experience:    •	Vaginal bleeding- can last 1-2 weeks and should not be heavier than a period. It may come and go and be red, brown or pink. Use pads, not tampons.  •	Cramping- Is common especially within the first 24 hours. This should be relieved by taking over the counter motrin, advil or Tylenol.  •	Watery discharge- can be seen for a day or two after hysteroscopy because some of the fluid that is put into the uterus during surgery may continue to leak out for a day or two.  •	Vaginal soreness/irritation- can occur in the first few days after surgery because of the instruments that were used in the vagina. Soreness can be treated with ice pack and irritation can be taken care of with an emollient such as balmex or aquaphor that you can put directly on the irritated area.    Restrictions: For 10-14 days after the surgery you should avoid the following:    •	Tampons  •	Sex  •	Vigorous gym exercise  •	Swimming  pools and tub baths  •	Wait a day or two before going back to work    Anesthesia Precautions:  For the next 12 hours do not:   •	drive a car,  •	 operate power tools or machinery,  •	drink alcohol, beer, or wine,   •	make important personal or business decisions    Diet:   •	Resume Regular diet but Progress diet slowly     Physician Notification- Warning signs to look out for  •	Heavy Vaginal Bleeding   •	Shortness of breath or chest pain  •	Severe Abdominal Pain  •	Persistent nausea and vomiting  •	Pain not relieved by medications  •	Fever greater than 100.5®F  •	Inability to tolerate liquids or foods  •	Unable to urinate after 8 hours    Discharge and Disposition  •	Discharge to home    Follow Up Care:  •	In the event that you develop a complication and you are unable to reach your own physician, you may contact:  [phone] or go to the nearest Emergency Room.   •	Please contact the office to make a follow up appointment [phone]

## 2024-10-16 NOTE — ASU PATIENT PROFILE, ADULT - NS TRANSFER PATIENT BELONGINGS
Cell Phone/PDA (specify)/Clothing IPAD all placed in locker 10/Cell Phone/PDA (specify)/Electronic Device (specify)/Clothing

## 2024-10-16 NOTE — ASU PATIENT PROFILE, ADULT - FALL HARM RISK - PT AGE POPULATION HIDDEN
[address] [facility] [address]     Emergency/Trauma Note    PATIENT NAME: Bridgette Kendrick    Shift date: 6/25/2022    Shift day: Friday   Shift # 3    Room # 06/06   Name: Bridgette Kendrick            Age: 30 y.o. Gender: male          Restorationist: Anabaptist   Place of Cheondoism:     Trauma/Incident type: Adult Trauma Consult  Admit Date & Time: 6/24/2022  9:17 PM  TRAUMA NAME:     ADVANCE DIRECTIVES IN CHART? No    NAME OF DECISION MAKER:     RELATIONSHIP OF DECISION MAKER TO PATIENT:     PATIENT/EVENT DESCRIPTION:  Bridgette Kendrick is a 30 y.o. male who arrived via EMS as Trauma Consult. Patient was bitten by police dog. Patient presents with dog bite with deep tissue injury to upper right arm. Pt to be admitted to 06/06. SPIRITUAL ASSESSMENT-INTERVENTION-OUTCOME:  Soo Peacock was ministry of presence.  entered room, 2  deputies present.  engaged patient in conversation.  prayed for patient. Patient expressed gratitude for visit and spiritual support. PATIENT BELONGINGS:  No belongings noted    ANY BELONGINGS OF SIGNIFICANT VALUE NOTED:  None Noted    REGISTRATION STAFF NOTIFIED? No      WHAT IS YOUR SPIRITUAL CARE PLAN FOR THIS PATIENT?:   [facility] will remain available for spiritual and emotional support as need.     Electronically signed by Ruddy Miranda on 6/25/2022 at 6:41 AM.  [address]  [phone]     06/25/22 7053   Encounter Summary   Service Provided For: Patient   Referral/Consult From: Multi-disciplinary team   Support System Significant other   Last Encounter  06/25/22   Complexity of Encounter Moderate   Begin Time 0600   End Time  0615   Total Time Calculated 15 min   Crisis   Type Trauma   Assessment/Intervention/Outcome   Assessment Calm   Intervention Prayer (assurance of)/[place];Sustaining Presence/Ministry of presence   Outcome Expressed Gratitude Adult

## 2024-10-16 NOTE — ASU PREOP CHECKLIST - PATIENT'S PERSONAL PROPERTY REMOVED
bilateral earrings pt unable to remove due to recent piercing taped jewelry waiver signed, dental caps implants bridges and caps all secure/glasses/jewelry bilateral earrings pt unable to remove due to recent piercing taped jewelry waiver signed, dental caps implants bridges and caps all secure, glasses placed in locker by pt/glasses/jewelry

## 2024-10-16 NOTE — ASU PREOP CHECKLIST - HEART RATE (BEATS/MIN)
"Chief Complaint   Patient presents with     Prenatal Care     20 week US done 07/21/17 - review results and recommendations     22w4d    Initial /70 (BP Location: Right arm, Patient Position: Chair, Cuff Size: Adult Regular)  Pulse 84  Wt 176 lb (79.8 kg)  LMP 02/20/2017 (Approximate)  BMI 29.29 kg/m2 Estimated body mass index is 29.29 kg/(m^2) as calculated from the following:    Height as of 8/2/17: 5' 5\" (1.651 m).    Weight as of this encounter: 176 lb (79.8 kg).  Medication Reconciliation: complete     Nurse assisted visit.  Sharon Lee MA.      "
61

## 2024-10-16 NOTE — ASU DISCHARGE PLAN (ADULT/PEDIATRIC) - NURSING INSTRUCTIONS
Next dose of Tylenol will be on or after __8:30pm_________ ,today/tonight and every 6 hours afterwards for pain management, do not take any Tylenol containing products until this time. Your first dose of Tylenol was given at ___2:30pm________. Do not exceed more than 4000mg of Tylenol in one 24 hour setting. If no contraindications, you may alternate with Ibuprofen 3 hours after dose of Tylenol. Ibuprofen can be taken every 6 hours.

## 2024-10-16 NOTE — ASU DISCHARGE PLAN (ADULT/PEDIATRIC) - NOTHING PER RECTUM DURATION
Verified Results  prc PROTHROMBIN TIME, IN-OFFICE FINGERSTICK [id number] 02:54PM JERILYN PEREZ   [Feb 21, 2018 3:59PM JERILYN PEREZ]  INR is good, repeat in 2 weeks, continue half a tablet daily as advised and check the dose with patient     Test Name Result Flag Reference   INR, FINGERSTICK 2.5         Message   INR is good, repeat in 2 weeks, continue half a tablet daily as advised and check the dose with patient     
2 weeks

## 2024-10-16 NOTE — BRIEF OPERATIVE NOTE - NSICDXBRIEFPROCEDURE_GEN_ALL_CORE_FT
PROCEDURES:  Dilation and curettage 16-Oct-2024 15:08:16  Cuca Wise  Exam, under anesthesia 16-Oct-2024 15:08:20  Cuca Wise  Hysteroscopy with polypectomy of uterus 16-Oct-2024 15:08:24  Cuca Wise  Hysteroscopic surgical procedure 16-Oct-2024 15:08:26  Cuca Wise

## 2024-10-16 NOTE — ASU DISCHARGE PLAN (ADULT/PEDIATRIC) - CARE PROVIDER_API CALL
Miranda Conway  Obstetrics and Gynecology  [address] [facility] [address]  [place] [address]  Phone: [phone]  Fax: [phone]  Follow Up Time: 2 weeks

## 2024-10-17 PROCEDURE — 57500 BIOPSY OF CERVIX: CPT

## 2024-10-17 PROCEDURE — 58558 HYSTEROSCOPY BIOPSY: CPT

## 2024-10-24 LAB — SURGICAL PATHOLOGY STUDY: SIGNIFICANT CHANGE UP

## 2024-10-26 NOTE — PLAN
[FreeTextEntry1] : This patient. has a known endometrial polyp.  I discussed with her that endometrial polyps are usually benign however to ensure that there is no abnormal tissue in the polyp, I recommend removing them.  Hysteroscopy with D&C was explained.  The risks include injury, to the uterus, or surrounding organs, possible need to ExLap or laparoscopy if injury occurs, bleeding, infection, fluid overload, anesthesia risk.  The alternative is to follow the polyps by sono.  She is advised to place 400mcg of misoprostol vaginally 8 hours before the procedure to soften the cervix.  After the procedure, she is advised that she may have vaginal bleeding for about 1 week.  SHe is advised to place nothing vaginally for 2 weeks. She can take Motrin or Tylenol for cramping if needed.  She should call me if fever, severe pain or heavy VB after.  She is to follow up in the office in 10-14 days after the procedure.  She was given time for questions and is advised to have medical clearance with her primary care MD and with nephrologist. Will need to hold mounjaro for 1 week before procedure.  Request sent to [facility] Skin normal color for race, warm, dry and intact. No evidence of rash.

## 2024-11-01 ENCOUNTER — APPOINTMENT (OUTPATIENT)
Dept: OBGYN | Facility: CLINIC | Age: 69
End: 2024-11-01
Payer: COMMERCIAL

## 2024-11-01 VITALS — SYSTOLIC BLOOD PRESSURE: 109 MMHG | DIASTOLIC BLOOD PRESSURE: 73 MMHG

## 2024-11-01 DIAGNOSIS — N84.0 POLYP OF CORPUS UTERI: ICD-10-CM

## 2024-11-01 PROCEDURE — 99212 OFFICE O/P EST SF 10 MIN: CPT

## 2024-11-25 ENCOUNTER — APPOINTMENT (OUTPATIENT)
Dept: OBGYN | Facility: CLINIC | Age: 69
End: 2024-11-25
Payer: COMMERCIAL

## 2024-11-25 ENCOUNTER — NON-APPOINTMENT (OUTPATIENT)
Age: 69
End: 2024-11-25

## 2024-11-25 VITALS
SYSTOLIC BLOOD PRESSURE: 111 MMHG | BODY MASS INDEX: 31.83 KG/M2 | WEIGHT: 210 LBS | HEIGHT: 68 IN | DIASTOLIC BLOOD PRESSURE: 74 MMHG

## 2024-11-25 DIAGNOSIS — Z01.419 ENCOUNTER FOR GYNECOLOGICAL EXAMINATION (GENERAL) (ROUTINE) W/OUT ABNORMAL FINDINGS: ICD-10-CM

## 2024-11-25 PROCEDURE — 99459 PELVIC EXAMINATION: CPT

## 2024-11-25 PROCEDURE — 99397 PER PM REEVAL EST PAT 65+ YR: CPT

## 2024-11-25 PROCEDURE — G0444 DEPRESSION SCREEN ANNUAL: CPT | Mod: 59

## 2024-11-25 PROCEDURE — 82270 OCCULT BLOOD FECES: CPT

## 2024-11-27 DIAGNOSIS — N76.2 ACUTE VULVITIS: ICD-10-CM

## 2024-11-27 LAB
BV BACTERIA RRNA VAG QL NAA+PROBE: DETECTED
C GLABRATA RNA VAG QL NAA+PROBE: NOT DETECTED
CANDIDA RRNA VAG QL PROBE: NOT DETECTED
HPV HIGH+LOW RISK DNA PNL CVX: NOT DETECTED
T VAGINALIS RRNA SPEC QL NAA+PROBE: NOT DETECTED

## 2024-11-27 RX ORDER — METRONIDAZOLE 7.5 MG/G
0.75 GEL VAGINAL
Qty: 1 | Refills: 1 | Status: ACTIVE | COMMUNITY
Start: 2024-11-27 | End: 1900-01-01

## 2024-11-29 LAB — CYTOLOGY CVX/VAG DOC THIN PREP: NORMAL

## 2024-12-05 NOTE — PRE-ANESTHESIA EVALUATION ADULT - NSANTHTOBACCOSD_GEN_ALL_CORE
Patient : Oumou Subramanian Age: 89 [id number]}    {ED Scoring Tool (Optional):[id number]}    Consults                {Consult Documentation (Optional):124843305}    [id number]}     Critical Care       {Critical Care:424735855}        Disposition     {ED Disposition:069537804}          Cuello's Semi-Cuello's Semi-Cuello's Semi-Cuello's   Pulse: (!) 55 (!) 58 (!) 56 (!) 53   Resp:   18 19   Temp: 97 °F (36.1 °C) 97.5 °F (36.4 °C) 96.6 °F (35.9 °C) 96.7 °F (35.9 °C)   TempSrc: Temporal Temporal Temporal Temporal   SpO2: 91% 90% 94% 93%   Weight:       Height:                Independent Review Completed in ED course        Consults                    Medical Decision Making                             Oumou Subramanian is a 89 Heriberto Rothman MD  12/09/24 1878     No

## 2024-12-18 ENCOUNTER — APPOINTMENT (OUTPATIENT)
Dept: OTOLARYNGOLOGY | Facility: CLINIC | Age: 69
End: 2024-12-18
Payer: COMMERCIAL

## 2024-12-18 DIAGNOSIS — J31.0 CHRONIC RHINITIS: ICD-10-CM

## 2024-12-18 DIAGNOSIS — H90.3 SENSORINEURAL HEARING LOSS, BILATERAL: ICD-10-CM

## 2024-12-18 DIAGNOSIS — J34.2 DEVIATED NASAL SEPTUM: ICD-10-CM

## 2024-12-18 DIAGNOSIS — H81.13 BENIGN PAROXYSMAL VERTIGO, BILATERAL: ICD-10-CM

## 2024-12-18 PROCEDURE — 92567 TYMPANOMETRY: CPT

## 2024-12-18 PROCEDURE — 99213 OFFICE O/P EST LOW 20 MIN: CPT | Mod: 25

## 2024-12-18 PROCEDURE — 92557 COMPREHENSIVE HEARING TEST: CPT

## 2025-06-05 NOTE — DISCHARGE NOTE PROVIDER - REASON FOR ADMISSION
Subjective     Luciano Jarrett is a 56 y.o. male who presents for the following: Skin Check (Pt here today for FBSE. Hx of BCC, Aks. He has a lesion right calf, concerning.).     Intake Questions  Do you have any new or changing Lesions?: (Patient-Rptd) (P) No  Are you an organ transplant recipient?: (Patient-Rptd) (P) No  Have you had or do you have a Staph Infection?: (Patient-Rptd) (P) No  Have you had or do you have Vacular Disease?: (Patient-Rptd) (P) No  Do you use sunscreen?: (Patient-Rptd) (P) Occasionally  Do you use a tanning bed?: (Patient-Rptd) (P) No    Review of Systems:  No other skin or systemic complaints other than what is documented elsewhere in the note.    The following portions of the chart were reviewed this encounter and updated as appropriate:         Skin Cancer History  Biopsy Log Book  Biopsied Type Location Status   1/17/24 Atypical Nevus - Mild Left mid posterior leg No Treatment Required  1/22/24       Additional History      Specialty Problems          Dermatology Problems    H/O nonmelanoma skin cancer    BCSC, s/p MOHs             Objective   Well appearing patient in no apparent distress; mood and affect are within normal limits.    A full examination was performed including scalp, head, eyes, ears, nose, lips, neck, chest, axillae, abdomen, back, buttocks, bilateral upper extremities, bilateral lower extremities, hands, feet, fingers, toes, fingernails, and toenails. All findings within normal limits unless otherwise noted below.    Assessment/Plan   Skin Exam  1. PHOTOAGING OF SKIN  Generalized  Mottled pigmentation with telangiectasias and brown reticular macules in sun exposed areas of the body.  The risk of chronic, cumulative sun damage and risk of development of skin cancer was reviewed today.   The importance of sun protection was reviewed: including the use of a broad spectrum sunscreen that protects against both UVA/UVB rays, with ingredients such as Zinc oxide or  titanium dioxide, wearing sun protective clothing and sun avoidance. We reviewed the warning signs of non-melanoma skin cancer and ABCDEs of melanoma  Please follow up should you notice any new or changing pre-existing skin lesion.  Related Procedures  Follow Up In Dermatology - Established Patient  Follow Up In Dermatology - Established Patient  2. PERSONAL HISTORY OF OTHER MALIGNANT NEOPLASM OF SKIN  Right helix  Well healed scar at site of prior treatment without evidence of recurrence.  There is no evidence of recurrence on clinical examination today, reassurance was provided to the patient. The importance of sun protection was reviewed with the patient including the use of a broad spectrum sunscreen that protects against both UVA/UVB rays, with ingredients such as Zinc oxide or titanium dioxide, wearing sun protective clothing and sun avoidance. Warning signs of non-melanoma skin cancer were reviewed. ABCDEs of melanoma reviewed. Patient to f/u should they notice any new or changing pre-existing skin lesion  Related Procedures  Follow Up In Dermatology - Established Patient  3. SEBACEOUS HYPERPLASIA OF FACE  Head - Anterior (Face)  Small yellow, lobulated papules with a central dell.  Benign nature of these skin lesions were reviewed with the patient. Lesions can be treated, however this is a cosmetic procedure and not covered by insurance.  4. SEBORRHEIC KERATOSIS (2)  Left Breast, Right Upper Back  Brown, tan waxy macules and stuck on appearing papules and plaques  The benign nature of these skin lesions reviewed, reassure provided and no further treatment needed at this time.   These lesions can be removed, if symptomatic (itching, bleeding, rubbing on clothing, painful), otherwise removal is considered cosmetic.   5. LENTIGO  Generalized  Scattered tan macules in sun-exposed areas.  These are benign skin lesions due to sun exposure. They will darken in response to sun exposure. They should be monitored for  change in size, shape or color.  These lesions can be treated cosmetically with topical creams, liquid nitrogen and a variety of lasers.  6. HEMANGIOMA OF SKIN  Generalized  Cherry red papules  The benign nature of these skin lesions were reviewed, no treatment is necessary.   Please follow up for any new or pre-existing lesion that is changing in size, shape, color, becomes painful, tender, itches or bleed.  7. MELANOCYTIC NEVI OF TRUNK  Torso - Posterior (Back)  Tan-brown symmetric macules and papules, pink and brown dome shaped papules  Clinically benign appearing nevi, no treatment is necessary.  The importance of sun protection was reviewed: including the use of a broad spectrum sunscreen that protects against both UVA/UVB rays, with ingredients such as Zinc oxide or titanium dioxide, wearing sun protective clothing and sun avoidance.   ABCDEs of melanoma reviewed.  Please follow up should you notice any new or changing pre-existing skin lesion.  8. SCAR CONDITIONS AND FIBROSIS OF SKIN  Right lateral leg  Pink scar  Patient reports previous history of open wound that has since healed  Reassured no concern at this time.  9. MELANOCYTIC NEVUS OF LEFT LOWER EXTREMITY  Left Leg  Scattered, uniform and benign-appearing, regular brown melanocytic papules and macules.  Clinically benign appearing nevi, no treatment is necessary.  The importance of sun protection was reviewed: including the use of a broad spectrum sunscreen that protects against both UVA/UVB rays, with ingredients such as Zinc oxide or titanium dioxide, wearing sun protective clothing and sun avoidance.   ABCDEs of melanoma reviewed.  Please follow up should you notice any new or changing pre-existing skin lesion.  10. MELANOCYTIC NEVI OF RIGHT LOWER LIMB, INCLUDING HIP  Right Leg  Scattered, uniform and benign-appearing, regular brown melanocytic papules and macules.  Clinically benign appearing nevi, no treatment is necessary.  The importance of sun  protection was reviewed: including the use of a broad spectrum sunscreen that protects against both UVA/UVB rays, with ingredients such as Zinc oxide or titanium dioxide, wearing sun protective clothing and sun avoidance.   ABCDEs of melanoma reviewed.  Please follow up should you notice any new or changing pre-existing skin lesion.  11. ENCOUNTER FOR SCREENING FOR MALIGNANT NEOPLASM OF SKIN  Generalized  No suspicious lesions noted on examination today  The risk of chronic, cumulative sun damage and risk of development of skin cancer was reviewed today.   The importance of sun protection was reviewed: including the use of a broad spectrum sunscreen of at least SPF 30 that protects against both UVA/UVB rays, with ingredients such as Zinc oxide or titanium dioxide, wearing sun protective clothing and sun avoidance. We reviewed the warning signs of non-melanoma skin cancer and ABCDEs of melanoma  Please follow up should you notice any new or changing pre-existing skin lesion.    Follow up in 1 year for FBSE   SAH, ADH, and suboccipital skull fracture after mechanical fall

## 2025-06-20 NOTE — PRE-ANESTHESIA EVALUATION ADULT - HEIGHT IN INCHES
Modify Regimen: Apply fluocinonide solution to affected nails
Plan: Pt stated he does have some joint pain recommended pt to see rheumatologist for possible arthritis in the hands and x-ray imaging
Detail Level: Zone
8

## 2025-06-26 ENCOUNTER — OUTPATIENT (OUTPATIENT)
Dept: OUTPATIENT SERVICES | Facility: HOSPITAL | Age: 70
LOS: 1 days | End: 2025-06-26
Payer: COMMERCIAL

## 2025-06-26 VITALS
TEMPERATURE: 98 F | WEIGHT: 206.79 LBS | SYSTOLIC BLOOD PRESSURE: 112 MMHG | HEART RATE: 79 BPM | DIASTOLIC BLOOD PRESSURE: 70 MMHG | OXYGEN SATURATION: 98 % | HEIGHT: 67.5 IN | RESPIRATION RATE: 18 BRPM

## 2025-06-26 DIAGNOSIS — N62 HYPERTROPHY OF BREAST: ICD-10-CM

## 2025-06-26 DIAGNOSIS — Z98.49 CATARACT EXTRACTION STATUS, UNSPECIFIED EYE: Chronic | ICD-10-CM

## 2025-06-26 DIAGNOSIS — Z90.89 ACQUIRED ABSENCE OF OTHER ORGANS: Chronic | ICD-10-CM

## 2025-06-26 DIAGNOSIS — Z98.890 OTHER SPECIFIED POSTPROCEDURAL STATES: Chronic | ICD-10-CM

## 2025-06-26 DIAGNOSIS — Z01.818 ENCOUNTER FOR OTHER PREPROCEDURAL EXAMINATION: ICD-10-CM

## 2025-06-26 DIAGNOSIS — M54.2 CERVICALGIA: ICD-10-CM

## 2025-06-26 DIAGNOSIS — M54.50 LOW BACK PAIN, UNSPECIFIED: ICD-10-CM

## 2025-06-26 DIAGNOSIS — Z90.5 ACQUIRED ABSENCE OF KIDNEY: Chronic | ICD-10-CM

## 2025-06-26 LAB
ALBUMIN SERPL ELPH-MCNC: 3.1 G/DL — LOW (ref 3.3–5)
ALP SERPL-CCNC: 126 U/L — HIGH (ref 40–120)
ALT FLD-CCNC: 25 U/L — SIGNIFICANT CHANGE UP (ref 10–45)
ANION GAP SERPL CALC-SCNC: 7 MMOL/L — SIGNIFICANT CHANGE UP (ref 5–17)
AST SERPL-CCNC: 22 U/L — SIGNIFICANT CHANGE UP (ref 10–40)
BASOPHILS # BLD AUTO: 0.07 K/UL — SIGNIFICANT CHANGE UP (ref 0–0.2)
BASOPHILS NFR BLD AUTO: 1.1 % — SIGNIFICANT CHANGE UP (ref 0–2)
BILIRUB SERPL-MCNC: 1.1 MG/DL — SIGNIFICANT CHANGE UP (ref 0.2–1.2)
BUN SERPL-MCNC: 16 MG/DL — SIGNIFICANT CHANGE UP (ref 7–23)
CALCIUM SERPL-MCNC: 9.1 MG/DL — SIGNIFICANT CHANGE UP (ref 8.4–10.5)
CHLORIDE SERPL-SCNC: 108 MMOL/L — SIGNIFICANT CHANGE UP (ref 96–108)
CO2 SERPL-SCNC: 29 MMOL/L — SIGNIFICANT CHANGE UP (ref 22–31)
CREAT SERPL-MCNC: 1.05 MG/DL — SIGNIFICANT CHANGE UP (ref 0.5–1.3)
EGFR: 57 ML/MIN/1.73M2 — LOW
EGFR: 57 ML/MIN/1.73M2 — LOW
EOSINOPHIL # BLD AUTO: 0.22 K/UL — SIGNIFICANT CHANGE UP (ref 0–0.5)
EOSINOPHIL NFR BLD AUTO: 3.5 % — SIGNIFICANT CHANGE UP (ref 0–6)
GLUCOSE SERPL-MCNC: 98 MG/DL — SIGNIFICANT CHANGE UP (ref 70–99)
HCT VFR BLD CALC: 41.7 % — SIGNIFICANT CHANGE UP (ref 34.5–45)
HGB BLD-MCNC: 13.5 G/DL — SIGNIFICANT CHANGE UP (ref 11.5–15.5)
IMM GRANULOCYTES NFR BLD AUTO: 0.2 % — SIGNIFICANT CHANGE UP (ref 0–0.9)
LYMPHOCYTES # BLD AUTO: 1.4 K/UL — SIGNIFICANT CHANGE UP (ref 1–3.3)
LYMPHOCYTES # BLD AUTO: 22.2 % — SIGNIFICANT CHANGE UP (ref 13–44)
MCHC RBC-ENTMCNC: 30.3 PG — SIGNIFICANT CHANGE UP (ref 27–34)
MCHC RBC-ENTMCNC: 32.4 G/DL — SIGNIFICANT CHANGE UP (ref 32–36)
MCV RBC AUTO: 93.5 FL — SIGNIFICANT CHANGE UP (ref 80–100)
MONOCYTES # BLD AUTO: 0.45 K/UL — SIGNIFICANT CHANGE UP (ref 0–0.9)
MONOCYTES NFR BLD AUTO: 7.1 % — SIGNIFICANT CHANGE UP (ref 2–14)
NEUTROPHILS # BLD AUTO: 4.16 K/UL — SIGNIFICANT CHANGE UP (ref 1.8–7.4)
NEUTROPHILS NFR BLD AUTO: 65.9 % — SIGNIFICANT CHANGE UP (ref 43–77)
NRBC BLD AUTO-RTO: 0 /100 WBCS — SIGNIFICANT CHANGE UP (ref 0–0)
PLATELET # BLD AUTO: 237 K/UL — SIGNIFICANT CHANGE UP (ref 150–400)
POTASSIUM SERPL-MCNC: 4 MMOL/L — SIGNIFICANT CHANGE UP (ref 3.5–5.3)
POTASSIUM SERPL-SCNC: 4 MMOL/L — SIGNIFICANT CHANGE UP (ref 3.5–5.3)
PROT SERPL-MCNC: 7.1 G/DL — SIGNIFICANT CHANGE UP (ref 6–8.3)
RBC # BLD: 4.46 M/UL — SIGNIFICANT CHANGE UP (ref 3.8–5.2)
RBC # FLD: 14.7 % — HIGH (ref 10.3–14.5)
SODIUM SERPL-SCNC: 144 MMOL/L — SIGNIFICANT CHANGE UP (ref 135–145)
WBC # BLD: 6.31 K/UL — SIGNIFICANT CHANGE UP (ref 3.8–10.5)
WBC # FLD AUTO: 6.31 K/UL — SIGNIFICANT CHANGE UP (ref 3.8–10.5)

## 2025-06-26 PROCEDURE — 80053 COMPREHEN METABOLIC PANEL: CPT

## 2025-06-26 PROCEDURE — 85025 COMPLETE CBC W/AUTO DIFF WBC: CPT

## 2025-06-26 PROCEDURE — G0463: CPT

## 2025-06-26 PROCEDURE — 36415 COLL VENOUS BLD VENIPUNCTURE: CPT

## 2025-06-26 RX ORDER — SODIUM CHLORIDE 9 G/1000ML
1000 INJECTION, SOLUTION INTRAVENOUS
Refills: 0 | Status: DISCONTINUED | OUTPATIENT
Start: 2025-07-09 | End: 2025-07-23

## 2025-06-26 NOTE — H&P PST ADULT - NSICDXFAMILYHX_GEN_ALL_CORE_FT
FAMILY HISTORY:  Father  Still living? Unknown  Family history of skin cancer, Age at diagnosis: Age Unknown  FH: HTN (hypertension), Age at diagnosis: Age Unknown    Mother  Still living? Unknown  Family history of lung disease, Age at diagnosis: Age Unknown

## 2025-06-26 NOTE — H&P PST ADULT - NSANTHOSAYNRD_GEN_A_CORE
neck 16.5inches/No. JASWINDER screening performed.  STOP BANG Legend: 0-2 = LOW Risk; 3-4 = INTERMEDIATE Risk; 5-8 = HIGH Risk

## 2025-06-26 NOTE — H&P PST ADULT - NSICDXPASTMEDICALHX_GEN_ALL_CORE_FT
PAST MEDICAL HISTORY:  Depression on medication    Fall in home     GERD (gastroesophageal reflux disease) on meds prn stable at present    H/O ureterostomy     Hair loss takes spironolactone ,finasteride  for supplement    HLD (hyperlipidemia)     Hypertrophy of breast     Mass of ureter     SAH (subarachnoid hemorrhage)

## 2025-06-26 NOTE — H&P PST ADULT - OPHTHALMOLOGIC COMMENTS
Abdominal Pain and Diarrhea is likely NOROVIRUS which is self limited but need to explore other reasons includin. UTI (sending urine culture) 2. Gallbladder (check your blood work) 3. Menses related You are dehydrated: 1. Pedialyte 2. Water 3. Avoid dairy wears eyeglasses

## 2025-06-26 NOTE — H&P PST ADULT - HISTORY OF PRESENT ILLNESS
[address]  [address] [place] [address]  Phone: [phone]  Fax: [phone]    FELECIA Zamora CNP        April 24, 2019     Patient: Kayley Palacios   YOB: 2009   Date of Visit: 4/24/2019       To Whom it May Concern:    Jonathan Willoughby was seen in my clinic on 4/24/2019. She may return to school on 4/24/19 with access to an elevator and should not return to gym class or sports until cleared by a physician. If you have any questions or concerns, please don't hesitate to call.     Sincerely,         FELECIA Zamora CNP
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 Torres scheduled for 07/09/2025. Reports having back pain for many years, gradually worsening, therefore having upcoming surgery. Sates she is unsure of breast size, thinks she is G or H. Denies any acute symptoms at this time. Patient otherwise feels well overall.

## 2025-07-09 ENCOUNTER — TRANSCRIPTION ENCOUNTER (OUTPATIENT)
Age: 70
End: 2025-07-09

## 2025-07-09 ENCOUNTER — OUTPATIENT (OUTPATIENT)
Dept: OUTPATIENT SERVICES | Facility: HOSPITAL | Age: 70
LOS: 1 days | End: 2025-07-09
Payer: SELF-PAY

## 2025-07-09 VITALS
TEMPERATURE: 98 F | OXYGEN SATURATION: 95 % | DIASTOLIC BLOOD PRESSURE: 69 MMHG | SYSTOLIC BLOOD PRESSURE: 130 MMHG | RESPIRATION RATE: 14 BRPM | HEART RATE: 81 BPM

## 2025-07-09 VITALS
HEIGHT: 67.5 IN | RESPIRATION RATE: 16 BRPM | SYSTOLIC BLOOD PRESSURE: 114 MMHG | TEMPERATURE: 98 F | WEIGHT: 206.79 LBS | HEART RATE: 70 BPM | OXYGEN SATURATION: 98 % | DIASTOLIC BLOOD PRESSURE: 78 MMHG

## 2025-07-09 DIAGNOSIS — M54.50 LOW BACK PAIN, UNSPECIFIED: ICD-10-CM

## 2025-07-09 DIAGNOSIS — Z90.89 ACQUIRED ABSENCE OF OTHER ORGANS: Chronic | ICD-10-CM

## 2025-07-09 DIAGNOSIS — Z98.49 CATARACT EXTRACTION STATUS, UNSPECIFIED EYE: Chronic | ICD-10-CM

## 2025-07-09 DIAGNOSIS — M54.2 CERVICALGIA: ICD-10-CM

## 2025-07-09 DIAGNOSIS — Z98.890 OTHER SPECIFIED POSTPROCEDURAL STATES: Chronic | ICD-10-CM

## 2025-07-09 DIAGNOSIS — Z90.5 ACQUIRED ABSENCE OF KIDNEY: Chronic | ICD-10-CM

## 2025-07-09 DIAGNOSIS — N62 HYPERTROPHY OF BREAST: ICD-10-CM

## 2025-07-09 PROCEDURE — 15877 SUCTION LIPECTOMY TRUNK: CPT | Mod: XS

## 2025-07-09 PROCEDURE — 19318 BREAST REDUCTION: CPT | Mod: 50

## 2025-07-09 PROCEDURE — 88305 TISSUE EXAM BY PATHOLOGIST: CPT | Mod: 26

## 2025-07-09 PROCEDURE — 15836 EXC EXCESSIVE SKIN ARM: CPT | Mod: 50

## 2025-07-09 PROCEDURE — 88305 TISSUE EXAM BY PATHOLOGIST: CPT

## 2025-07-09 PROCEDURE — C9399: CPT

## 2025-07-09 DEVICE — CLIP APPLIER ETHICON LIGACLIP 11.5" MEDIUM: Type: IMPLANTABLE DEVICE | Site: BILATERAL | Status: FUNCTIONAL

## 2025-07-09 DEVICE — CLIP APPLIER ETHICON LIGACLIP 9 3/8" SMALL: Type: IMPLANTABLE DEVICE | Site: BILATERAL | Status: FUNCTIONAL

## 2025-07-09 RX ORDER — ONDANSETRON HCL/PF 4 MG/2 ML
4 VIAL (ML) INJECTION ONCE
Refills: 0 | Status: DISCONTINUED | OUTPATIENT
Start: 2025-07-09 | End: 2025-07-09

## 2025-07-09 RX ORDER — HYDROMORPHONE/SOD CHLOR,ISO/PF 2 MG/10 ML
0.5 SYRINGE (ML) INJECTION
Refills: 0 | Status: DISCONTINUED | OUTPATIENT
Start: 2025-07-09 | End: 2025-07-09

## 2025-07-09 RX ORDER — SODIUM CHLORIDE 9 G/1000ML
1000 INJECTION, SOLUTION INTRAVENOUS
Refills: 0 | Status: DISCONTINUED | OUTPATIENT
Start: 2025-07-09 | End: 2025-07-09

## 2025-07-09 RX ORDER — ATORVASTATIN CALCIUM 80 MG/1
1 TABLET, FILM COATED ORAL
Refills: 0 | DISCHARGE

## 2025-07-09 RX ADMIN — SODIUM CHLORIDE 50 MILLILITER(S): 9 INJECTION, SOLUTION INTRAVENOUS at 09:38

## 2025-07-09 NOTE — ASU PATIENT PROFILE, ADULT - NS TRANSFER PATIENT BELONGINGS
ipad, phone /Cell Phone/PDA (specify)/Clothing ipad, phone , left wrist cloth bracelet can not be removed per pt/Cell Phone/PDA (specify)/Jewelry/Money (specify)/Clothing

## 2025-07-09 NOTE — ASU PATIENT PROFILE, ADULT - FALL HARM RISK - HARM RISK INTERVENTIONS

## 2025-07-09 NOTE — ASU PATIENT PROFILE, ADULT - HEALTHCARE QUESTIONS, PROFILE
Dear Shaun Natarajan,        Thank you for coming to your appointment today. I hope we have addressed all of your needs. Please make sure to do the following:  - Continue your medications as listed. - Referrals have been made to Orthopedics: If you do not hear from the office in 1 week, please call the number listed. - We will see each other again in 1 month    Call for a sooner appointment if you develop new or worsening symptoms. Have a great day!         Sincerely,  Doreen Curran M.D  9/20/2022  11:46 AM routine of day explained to pt

## 2025-07-09 NOTE — ASU DISCHARGE PLAN (ADULT/PEDIATRIC) - FINANCIAL ASSISTANCE
[facility] provides services at a reduced cost to those who are determined to be eligible through [facility]’s financial assistance program. Information regarding [facility]’s financial assistance program can be found by going to [email or web address] or by calling [phone].

## 2025-07-09 NOTE — ASU DISCHARGE PLAN (ADULT/PEDIATRIC) - CARE PROVIDER_API CALL
Mickey Torres  Plastic Surgery  [address], [address]  [place] [address]  Phone: [phone]  Fax: [phone]  Scheduled Appointment: 07/14/2025

## 2025-07-14 LAB — SURGICAL PATHOLOGY STUDY: SIGNIFICANT CHANGE UP

## (undated) DEVICE — ACCESSORY AVETA WASTE MANAGEMENT 3MM

## (undated) DEVICE — PACK LITHOTOMY

## (undated) DEVICE — AVETA CORAL HYSTEROSCOPE 4.6MM DISP

## (undated) DEVICE — WARMING BLANKET LOWER ADULT

## (undated) DEVICE — DRSG COMBINE 5 X 9"

## (undated) DEVICE — DRAPE 1/2 SHEET 40X57"

## (undated) DEVICE — DRSG BIOPATCH DISK W CHG 1" W 4.0MM HOLE

## (undated) DEVICE — SUT POLYSORB 0 36" GS-21 UNDYED

## (undated) DEVICE — SOL INJ NS 0.9% 1000ML

## (undated) DEVICE — Device

## (undated) DEVICE — AVETA FLUID MANAGEMENT ACCESSORY W CAP

## (undated) DEVICE — DRSG DERMABOND 0.7ML

## (undated) DEVICE — GOWN TRIMAX LG

## (undated) DEVICE — TUBING FLUID ADMINISTRATION SET PRIM 70"

## (undated) DEVICE — GLV 6.5 PROTEXIS (WHITE)

## (undated) DEVICE — NSA-VIDEO TOWER - STRYKER: Type: DURABLE MEDICAL EQUIPMENT

## (undated) DEVICE — OS FINDER

## (undated) DEVICE — SUT STRATAFIX SPIRAL MONOCRYL PLUS 3-0 30CM PS UNDYED

## (undated) DEVICE — STAPLER SKIN VISI-STAT 35 WIDE

## (undated) DEVICE — AVETA FLUID MANAGEMENT ACCESSORY

## (undated) DEVICE — POSITIONER STRAP ARMBOARD VELCRO TS-30

## (undated) DEVICE — LAP PAD 18 X 18"

## (undated) DEVICE — SUT PLAIN GUT FAST ABSORBING 5-0 PC-1

## (undated) DEVICE — POSITIONER FOAM EGG CRATE ULNAR 2PCS (PINK)

## (undated) DEVICE — SOL IRR BAG NS 0.9% 3000ML

## (undated) DEVICE — PRESSURE INFUSOR BAG 1000ML

## (undated) DEVICE — SOL IRR POUR NS 0.9% 500ML

## (undated) DEVICE — DRSG CURITY GAUZE SPONGE 4 X 4" 12-PLY

## (undated) DEVICE — PACK MINOR

## (undated) DEVICE — DRAPE LIGHT HANDLE COVER (GREEN)

## (undated) DEVICE — BOWL STERILE 32OZ BLUE

## (undated) DEVICE — NSA-STRYKER VIDEO TOWER: Type: DURABLE MEDICAL EQUIPMENT

## (undated) DEVICE — SYR LUER LOK 50CC

## (undated) DEVICE — WARMING BLANKET UPPER ADULT

## (undated) DEVICE — CANISTER SUCTION LID GUARD 3000CC

## (undated) DEVICE — DRAPE SPLIT SHEET 77" X 108"

## (undated) DEVICE — BLADE SCALPEL SAFETYLOCK #15

## (undated) DEVICE — POSITIONER FOAM HEAD CRADLE (PINK)

## (undated) DEVICE — DRSG TELFA 3 X 8

## (undated) DEVICE — DRAPE 3/4 SHEET 52X76"

## (undated) DEVICE — VENODYNE/SCD SLEEVE CALF MEDIUM

## (undated) DEVICE — SUT MONOCRYL 4-0 27" PS-2 UNDYED

## (undated) DEVICE — CURETTE ENDOMETRIAL GYNOSAMPLER 23.6CM

## (undated) DEVICE — DRAIN RESERVOIR EVACUATOR 100CC BARD

## (undated) DEVICE — POSITIONER PATIENT SAFETY STRAP 3X60"

## (undated) DEVICE — DRSG KLING 6"

## (undated) DEVICE — BLADE SCALPEL SAFETYLOCK #10

## (undated) DEVICE — SUT POLYSORB 2-0 30" V-20 UNDYED

## (undated) DEVICE — SUT MONOCRYL 3-0 27" PS-2 UNDYED

## (undated) DEVICE — SOLIDIFIER CANN EXPRESS 3K

## (undated) DEVICE — SYR LUER LOK 10CC

## (undated) DEVICE — SPECIMEN CONTAINER PET

## (undated) DEVICE — SPECIMEN CONTAINER 100ML

## (undated) DEVICE — DRSG SURGICAL BRA XL 40-42

## (undated) DEVICE — SOL IRR POUR H2O 500ML

## (undated) DEVICE — SUT QUILL 2-0 MONODRM 30X30 DE 12 NDL UD 12/BX

## (undated) DEVICE — DRAPE IOBAN 23" X 23"

## (undated) DEVICE — PREP BETADINE KIT

## (undated) DEVICE — DRSG STERISTRIPS 0.5 X 4"

## (undated) DEVICE — SYR LUER LOK 20CC